# Patient Record
Sex: FEMALE | Race: WHITE | NOT HISPANIC OR LATINO | Employment: UNEMPLOYED | URBAN - NONMETROPOLITAN AREA
[De-identification: names, ages, dates, MRNs, and addresses within clinical notes are randomized per-mention and may not be internally consistent; named-entity substitution may affect disease eponyms.]

---

## 2018-01-11 DIAGNOSIS — Z12.11 ENCOUNTER FOR SCREENING FOR MALIGNANT NEOPLASM OF COLON: ICD-10-CM

## 2018-01-11 DIAGNOSIS — Z12.31 ENCOUNTER FOR SCREENING MAMMOGRAM FOR MALIGNANT NEOPLASM OF BREAST: ICD-10-CM

## 2018-01-15 NOTE — MISCELLANEOUS
Provider Comments  Provider Comments:   PATIENT DID NOT SHOW FOR APPOINTMENT TODAY      Signatures   Electronically signed by : Patience Wong DO; Mar 22 2016  5:59PM EST                       (Author)

## 2018-01-17 NOTE — MISCELLANEOUS
Provider Comments  Provider Comments:   patient failed to show for appointment      Signatures   Electronically signed by : Duarte Marsh DO; May 31 2016  5:18PM EST                       (Author)

## 2018-02-23 DIAGNOSIS — F41.9 ANXIETY: Primary | ICD-10-CM

## 2018-02-23 RX ORDER — ALPRAZOLAM 0.5 MG/1
1 TABLET ORAL EVERY 8 HOURS PRN
COMMUNITY
Start: 2012-07-03 | End: 2018-02-23 | Stop reason: SDUPTHER

## 2018-02-23 RX ORDER — ALPRAZOLAM 0.5 MG/1
0.5 TABLET ORAL EVERY 8 HOURS PRN
Qty: 90 TABLET | Refills: 0 | Status: SHIPPED | OUTPATIENT
Start: 2018-02-23 | End: 2018-03-26 | Stop reason: SDUPTHER

## 2018-02-23 NOTE — TELEPHONE ENCOUNTER
[facility] just make sure they rescheduled appt that they did not come to last month I think

## 2018-03-14 RX ORDER — LISINOPRIL 5 MG/1
TABLET ORAL
COMMUNITY
Start: 2014-06-25 | End: 2019-02-23 | Stop reason: ALTCHOICE

## 2018-03-14 RX ORDER — OMEPRAZOLE 20 MG/1
1 CAPSULE, DELAYED RELEASE ORAL AS NEEDED
COMMUNITY
Start: 2016-09-19 | End: 2019-02-23 | Stop reason: ALTCHOICE

## 2018-03-14 RX ORDER — ERGOCALCIFEROL 1.25 MG/1
1 CAPSULE ORAL WEEKLY
COMMUNITY
Start: 2016-10-06 | End: 2019-02-23 | Stop reason: ALTCHOICE

## 2018-03-14 RX ORDER — CITALOPRAM 20 MG/1
TABLET ORAL DAILY
COMMUNITY
Start: 2017-11-16 | End: 2018-03-17 | Stop reason: SDUPTHER

## 2018-03-14 RX ORDER — ASPIRIN 325 MG
TABLET, DELAYED RELEASE (ENTERIC COATED) ORAL
COMMUNITY

## 2018-03-14 RX ORDER — ATORVASTATIN CALCIUM 10 MG/1
1 TABLET, FILM COATED ORAL DAILY
COMMUNITY
Start: 2016-10-06 | End: 2019-08-05 | Stop reason: ALTCHOICE

## 2018-03-14 RX ORDER — METOPROLOL TARTRATE 50 MG/1
1 TABLET, FILM COATED ORAL 2 TIMES DAILY
COMMUNITY
Start: 2017-03-19 | End: 2018-03-17 | Stop reason: SDUPTHER

## 2018-03-14 RX ORDER — BUPROPION HYDROCHLORIDE 75 MG/1
1 TABLET ORAL DAILY
COMMUNITY
Start: 2015-09-08 | End: 2019-02-23 | Stop reason: ALTCHOICE

## 2018-03-17 ENCOUNTER — OFFICE VISIT (OUTPATIENT)
Dept: INTERNAL MEDICINE CLINIC | Facility: CLINIC | Age: 53
End: 2018-03-17
Payer: COMMERCIAL

## 2018-03-17 VITALS
HEART RATE: 73 BPM | WEIGHT: 171 LBS | TEMPERATURE: 96.8 F | HEIGHT: 64 IN | BODY MASS INDEX: 29.19 KG/M2 | OXYGEN SATURATION: 96 %

## 2018-03-17 DIAGNOSIS — F41.9 ANXIETY: ICD-10-CM

## 2018-03-17 DIAGNOSIS — I10 ESSENTIAL HYPERTENSION: Primary | ICD-10-CM

## 2018-03-17 DIAGNOSIS — J44.9 CHRONIC OBSTRUCTIVE PULMONARY DISEASE, UNSPECIFIED COPD TYPE (HCC): ICD-10-CM

## 2018-03-17 PROCEDURE — 99212 OFFICE O/P EST SF 10 MIN: CPT | Performed by: INTERNAL MEDICINE

## 2018-03-17 RX ORDER — METOPROLOL TARTRATE 50 MG/1
50 TABLET, FILM COATED ORAL 2 TIMES DAILY
Qty: 180 TABLET | Refills: 3 | Status: SHIPPED | OUTPATIENT
Start: 2018-03-17 | End: 2019-03-12 | Stop reason: SDUPTHER

## 2018-03-17 RX ORDER — CITALOPRAM 20 MG/1
20 TABLET ORAL DAILY
Qty: 90 TABLET | Refills: 3 | Status: SHIPPED | OUTPATIENT
Start: 2018-03-17 | End: 2019-11-08 | Stop reason: SDUPTHER

## 2018-03-17 NOTE — PROGRESS NOTES
Assessment/Plan:      Diagnoses and all orders for this visit:    Chronic obstructive pulmonary disease, unspecified COPD type ([facility] [address] )  -     Complete pulmonary function test; Future  Deferred since no insurance at this time    Essential hypertension  Refills sent to [facility]  Stop Smoking,  Low sodium diet    Anxiety  Continue present Rx    Pt and her  have been trying to get insurance coverage and have been meeting some challenges  We provided information hopefully that will help with coverage and allow them labs and other testing  Patient ID: Marielos Quintero is a 46 y o  female  HPI   Pt doing ok  Still smoking  She hurt he left ankle around the holidays and still has pain and swelling  Pt has not seen ortho because  insurance and limited financial means  She is stressed by financial challenges  No significant sob or chest pain  No uri sxs  Review of Systems   Constitutional: Negative  HENT: Negative  Eyes: Negative  Respiratory: Positive for shortness of breath  Cardiovascular: Negative  Gastrointestinal: Negative  Endocrine: Negative  Genitourinary: Negative  Musculoskeletal: Positive for joint swelling  Skin: Negative  Allergic/Immunologic: Negative  Neurological: Negative  Hematological: Negative  Psychiatric/Behavioral: Negative  Physical Exam   Constitutional: She is oriented to person, place, and time  She appears well-developed and well-nourished  HENT:   Head: Normocephalic and atraumatic  Eyes: Conjunctivae and EOM are normal  Pupils are equal, round, and reactive to light  Neck: Normal range of motion  Neck supple  Cardiovascular: Normal rate and regular rhythm  Pulmonary/Chest: Effort normal and breath sounds normal    Abdominal: Soft  Bowel sounds are normal    Musculoskeletal: She exhibits edema and deformity  Left ankle pain s/p injury   Lymphadenopathy:     She has no cervical adenopathy     Neurological: She is alert and oriented to person, place, and time  She has normal reflexes  She displays normal reflexes  No cranial nerve deficit  She exhibits abnormal muscle tone  Coordination abnormal    Skin: Skin is warm and dry  No rash noted  No erythema  No pallor  Psychiatric: She has a normal mood and affect  Her behavior is normal  Judgment and thought content normal    Nursing note and vitals reviewed

## 2018-03-26 DIAGNOSIS — F41.9 ANXIETY: ICD-10-CM

## 2018-03-26 RX ORDER — ALPRAZOLAM 0.5 MG/1
0.5 TABLET ORAL EVERY 8 HOURS PRN
Qty: 90 TABLET | Refills: 0 | Status: SHIPPED | OUTPATIENT
Start: 2018-03-26 | End: 2018-04-26 | Stop reason: SDUPTHER

## 2018-04-26 DIAGNOSIS — F41.9 ANXIETY: ICD-10-CM

## 2018-04-26 RX ORDER — ALPRAZOLAM 0.5 MG/1
0.5 TABLET ORAL EVERY 8 HOURS PRN
Qty: 90 TABLET | Refills: 0 | Status: SHIPPED | OUTPATIENT
Start: 2018-04-26 | End: 2018-05-23 | Stop reason: SDUPTHER

## 2018-05-23 DIAGNOSIS — F41.9 ANXIETY: ICD-10-CM

## 2018-05-23 RX ORDER — ALPRAZOLAM 0.5 MG/1
0.5 TABLET ORAL EVERY 8 HOURS PRN
Qty: 90 TABLET | Refills: 0 | Status: SHIPPED | OUTPATIENT
Start: 2018-05-23 | End: 2018-05-25 | Stop reason: SDUPTHER

## 2018-05-25 DIAGNOSIS — F41.9 ANXIETY: ICD-10-CM

## 2018-05-25 RX ORDER — ALPRAZOLAM 0.5 MG/1
0.5 TABLET ORAL EVERY 8 HOURS PRN
Qty: 90 TABLET | Refills: 0 | Status: SHIPPED | OUTPATIENT
Start: 2018-05-25 | End: 2018-06-25 | Stop reason: SDUPTHER

## 2018-06-25 DIAGNOSIS — F41.9 ANXIETY: ICD-10-CM

## 2018-06-25 RX ORDER — ALPRAZOLAM 0.5 MG/1
0.5 TABLET ORAL EVERY 8 HOURS PRN
Qty: 90 TABLET | Refills: 0 | Status: SHIPPED | OUTPATIENT
Start: 2018-06-25 | End: 2018-07-24 | Stop reason: SDUPTHER

## 2018-07-24 DIAGNOSIS — F41.9 ANXIETY: ICD-10-CM

## 2018-07-24 RX ORDER — ALPRAZOLAM 0.5 MG/1
0.5 TABLET ORAL EVERY 8 HOURS PRN
Qty: 90 TABLET | Refills: 0 | Status: SHIPPED | OUTPATIENT
Start: 2018-07-24 | End: 2018-08-24 | Stop reason: SDUPTHER

## 2018-08-24 DIAGNOSIS — F41.9 ANXIETY: ICD-10-CM

## 2018-08-24 RX ORDER — ALPRAZOLAM 0.5 MG/1
0.5 TABLET ORAL EVERY 8 HOURS PRN
Qty: 90 TABLET | Refills: 0 | Status: SHIPPED | OUTPATIENT
Start: 2018-08-24 | End: 2018-09-25 | Stop reason: SDUPTHER

## 2018-09-25 DIAGNOSIS — B37.9 YEAST INFECTION: ICD-10-CM

## 2018-09-25 DIAGNOSIS — F41.9 ANXIETY: ICD-10-CM

## 2018-09-25 DIAGNOSIS — J32.9 SINUSITIS, UNSPECIFIED CHRONICITY, UNSPECIFIED LOCATION: Primary | ICD-10-CM

## 2018-09-25 RX ORDER — ALPRAZOLAM 0.5 MG/1
0.5 TABLET ORAL EVERY 8 HOURS PRN
Qty: 90 TABLET | Refills: 0 | Status: SHIPPED | OUTPATIENT
Start: 2018-09-25 | End: 2018-10-23 | Stop reason: SDUPTHER

## 2018-09-25 RX ORDER — FLUCONAZOLE 150 MG/1
150 TABLET ORAL ONCE
Qty: 1 TABLET | Refills: 0 | Status: SHIPPED | OUTPATIENT
Start: 2018-09-25 | End: 2018-09-25

## 2018-09-25 RX ORDER — AZITHROMYCIN 250 MG/1
TABLET, FILM COATED ORAL
Qty: 6 TABLET | Refills: 0 | Status: SHIPPED | OUTPATIENT
Start: 2018-09-25 | End: 2018-09-29

## 2018-09-25 NOTE — TELEPHONE ENCOUNTER
Pt is c/o headaches, sinus pressure, chest congestion,cough, green mucus  If an antibiotic is called in the pt states that she will need something for a yeast infection because she normally gets one when she is on an antibiotic  Allergic to lorazepam  Pharmacy [facility]

## 2018-10-03 ENCOUNTER — TELEPHONE (OUTPATIENT)
Dept: INTERNAL MEDICINE CLINIC | Facility: CLINIC | Age: 53
End: 2018-10-03

## 2018-10-03 DIAGNOSIS — J32.9 SINUSITIS, UNSPECIFIED CHRONICITY, UNSPECIFIED LOCATION: Primary | ICD-10-CM

## 2018-10-03 RX ORDER — AMOXICILLIN 500 MG/1
500 CAPSULE ORAL EVERY 8 HOURS SCHEDULED
Qty: 21 CAPSULE | Refills: 0 | Status: SHIPPED | OUTPATIENT
Start: 2018-10-03 | End: 2018-10-10

## 2018-10-03 NOTE — TELEPHONE ENCOUNTER
Finished zpack and symptoms returned she states  Pt c/o fever, head shiloh, prod cough and diarrhea        All-Lorazepam

## 2018-10-23 DIAGNOSIS — F41.9 ANXIETY: ICD-10-CM

## 2018-10-23 RX ORDER — ALPRAZOLAM 0.5 MG/1
0.5 TABLET ORAL EVERY 8 HOURS PRN
Qty: 90 TABLET | Refills: 0 | Status: SHIPPED | OUTPATIENT
Start: 2018-10-23 | End: 2018-11-21 | Stop reason: SDUPTHER

## 2018-11-21 DIAGNOSIS — F41.9 ANXIETY: ICD-10-CM

## 2018-11-21 RX ORDER — ALPRAZOLAM 0.5 MG/1
0.5 TABLET ORAL EVERY 8 HOURS PRN
Qty: 90 TABLET | Refills: 0 | Status: SHIPPED | OUTPATIENT
Start: 2018-11-23 | End: 2018-12-20 | Stop reason: SDUPTHER

## 2018-12-20 DIAGNOSIS — F41.9 ANXIETY: ICD-10-CM

## 2018-12-20 RX ORDER — ALPRAZOLAM 0.5 MG/1
0.5 TABLET ORAL EVERY 8 HOURS PRN
Qty: 90 TABLET | Refills: 0 | Status: SHIPPED | OUTPATIENT
Start: 2018-12-20 | End: 2019-01-23 | Stop reason: SDUPTHER

## 2019-01-23 DIAGNOSIS — F41.9 ANXIETY: ICD-10-CM

## 2019-01-23 RX ORDER — ALPRAZOLAM 0.5 MG/1
0.5 TABLET ORAL EVERY 8 HOURS PRN
Qty: 90 TABLET | Refills: 0 | Status: SHIPPED | OUTPATIENT
Start: 2019-01-23 | End: 2019-04-22 | Stop reason: SDUPTHER

## 2019-02-01 ENCOUNTER — TELEPHONE (OUTPATIENT)
Dept: INTERNAL MEDICINE CLINIC | Facility: CLINIC | Age: 54
End: 2019-02-01

## 2019-02-01 NOTE — TELEPHONE ENCOUNTER
Pt called asking about prior auth for Alprazolam  Spoke with [facility] and she stated it was done were just waiting to hear back  Pt is aware

## 2019-02-05 DIAGNOSIS — F41.9 ANXIETY: Primary | ICD-10-CM

## 2019-02-05 RX ORDER — LORAZEPAM 0.5 MG/1
0.5 TABLET ORAL EVERY 8 HOURS PRN
Qty: 90 TABLET | Refills: 0 | Status: SHIPPED | OUTPATIENT
Start: 2019-02-05 | End: 2019-08-05 | Stop reason: ALTCHOICE

## 2019-02-05 NOTE — TELEPHONE ENCOUNTER
Spoke with Moiz Osman and she will try the Lorazepam that the ins will cover  ? Dose and frequency?      [facility]

## 2019-02-05 NOTE — TELEPHONE ENCOUNTER
Ins denied Alprazolam, patient is asking if she could pay out of pocket as she is afraid to try something else   She was on something in past which she states did not work     Insurance will only cover Lorazepam, no prior auth required, has to try this and fail then they will allow alprazolam

## 2019-02-23 ENCOUNTER — OFFICE VISIT (OUTPATIENT)
Dept: INTERNAL MEDICINE CLINIC | Facility: CLINIC | Age: 54
End: 2019-02-23
Payer: COMMERCIAL

## 2019-02-23 VITALS
BODY MASS INDEX: 31.62 KG/M2 | OXYGEN SATURATION: 96 % | DIASTOLIC BLOOD PRESSURE: 70 MMHG | HEIGHT: 64 IN | SYSTOLIC BLOOD PRESSURE: 134 MMHG | TEMPERATURE: 99.6 F | HEART RATE: 85 BPM | WEIGHT: 185.2 LBS

## 2019-02-23 DIAGNOSIS — Z12.31 SCREENING MAMMOGRAM, ENCOUNTER FOR: ICD-10-CM

## 2019-02-23 DIAGNOSIS — Z00.00 VISIT FOR PREVENTIVE HEALTH EXAMINATION: Primary | ICD-10-CM

## 2019-02-23 DIAGNOSIS — E66.9 OBESITY (BMI 30.0-34.9): ICD-10-CM

## 2019-02-23 DIAGNOSIS — F41.9 ANXIETY: ICD-10-CM

## 2019-02-23 DIAGNOSIS — Z12.11 COLON CANCER SCREENING: ICD-10-CM

## 2019-02-23 PROCEDURE — 99396 PREV VISIT EST AGE 40-64: CPT | Performed by: INTERNAL MEDICINE

## 2019-02-23 RX ORDER — ALPRAZOLAM 0.5 MG/1
0.5 TABLET ORAL
Qty: 30 TABLET | Refills: 0 | Status: SHIPPED | OUTPATIENT
Start: 2019-02-23 | End: 2019-03-21 | Stop reason: SDUPTHER

## 2019-02-23 RX ORDER — SERTRALINE HYDROCHLORIDE 25 MG/1
25 TABLET, FILM COATED ORAL DAILY
Qty: 30 TABLET | Refills: 5 | Status: SHIPPED | OUTPATIENT
Start: 2019-02-23 | End: 2019-04-15 | Stop reason: SDUPTHER

## 2019-02-23 NOTE — PATIENT INSTRUCTIONS
Low Fat Diet   AMBULATORY CARE:   A low-fat diet  is an eating plan that is low in total fat, unhealthy fat, and cholesterol  You may need to follow a low-fat diet if you have trouble digesting or absorbing fat  You may also need to follow this diet if you have high cholesterol  You can also lower your cholesterol by increasing the amount of fiber in your diet  Soluble fiber is a type of fiber that helps to decrease cholesterol levels  Different types of fat in food:   · Limit unhealthy fats  A diet that is high in cholesterol, saturated fat, and trans fat may cause unhealthy cholesterol levels  Unhealthy cholesterol levels increase your risk of heart disease  ¨ Cholesterol:  Limit intake of cholesterol to less than 200 mg per day  Cholesterol is found in meat, eggs, and dairy  ¨ Saturated fat:  Limit saturated fat to less than 7% of your total daily calories  Ask your dietitian how many calories you need each day  Saturated fat is found in butter, cheese, ice cream, whole milk, and palm oil  Saturated fat is also found in meat, such as beef, pork, chicken skin, and processed meats  Processed meats include sausage, hot dogs, and bologna  ¨ Trans fat:  Avoid trans fat as much as possible  Trans fat is used in fried and baked foods  Foods that say trans fat free on the label may still have up to 0 5 grams of trans fat per serving  · Include healthy fats  Replace foods that are high in saturated and trans fat with foods high in healthy fats  This may help to decrease high cholesterol levels  ¨ Monounsaturated fats: These are found in avocados, nuts, and vegetable oils, such as olive, canola, and sunflower oil  ¨ Polyunsaturated fats: These can be found in vegetable oils, such as soybean or corn oil  Omega-3 fats can help to decrease the risk of heart disease  Omega-3 fats are found in fish, such as salmon, herring, trout, and tuna   Omega-3 fats can also be found in plant foods, such as walnuts, flaxseed, soybeans, and canola oil    Foods to limit or avoid:   · Grains:      ¨ Snacks that are made with partially hydrogenated oils, such as chips, regular crackers, and butter-flavored popcorn    ¨ High-fat baked goods, such as biscuits, croissants, doughnuts, pies, cookies, and pastries    · Dairy:      ¨ Whole milk, 2% milk, and yogurt and ice cream made with whole milk    ¨ Half and half creamer, heavy cream, and whipping cream    ¨ Cheese, cream cheese, and sour cream    · Meats and proteins:      ¨ High-fat cuts of meat (T-bone steak, regular hamburger, and ribs)    ¨ Fried meat, poultry (turkey and chicken), and fish    ¨ Poultry (chicken and turkey) with skin    ¨ Cold cuts (salami or bologna), hot dogs, adames, and sausage    ¨ Whole eggs and egg yolks    · Vegetables and fruits with added fat:      ¨ Fried vegetables or vegetables in butter or high-fat sauces, such as cream or cheese sauces    ¨ Fried fruit or fruit served with butter or cream    · Fats:      ¨ Butter, stick margarine, and shortening    ¨ Coconut, palm oil, and palm kernel oil  Foods to include:   · Grains:      ¨ Whole-grain breads, cereals, pasta, and brown rice    ¨ Low-fat crackers and pretzels    · Vegetables and fruits:      ¨ Fresh, frozen, or canned vegetables (no salt or low-sodium)    ¨ Fresh, frozen, dried, or canned fruit (canned in light syrup or fruit juice)    ¨ Avocado    · Low-fat dairy products:      ¨ Nonfat (skim) or 1% milk    ¨ Nonfat or low-fat cheese, yogurt, and cottage cheese    · Meats and proteins:      ¨ Chicken or turkey with no skin    ¨ Baked or broiled fish    ¨ Lean beef and pork (loin, round, extra lean hamburger)    ¨ Beans and peas, unsalted nuts, soy products    ¨ Egg whites and substitutes    ¨ Seeds and nuts    · Fats:      ¨ Unsaturated oil, such as canola, olive, peanut, soybean, or sunflower oil    ¨ Soft or liquid margarine and vegetable oil spread    ¨ Low-fat salad dressing  Other ways to decrease fat:   · Read food labels before you buy foods  Choose foods that have less than 30% of calories from fat  Choose low-fat or fat-free dairy products  Remember that fat free does not mean calorie free  These foods still contain calories, and too many calories can lead to weight gain  · Trim fat from meat and avoid fried food  Trim all visible fat from meat before you cook it  Remove the skin from poultry  Do not mahan meat, fish, or poultry  Bake, roast, boil, or broil these foods instead  Avoid fried foods  Eat a baked potato instead of Western Josi fries  Steam vegetables instead of sautéing them in butter  · Add less fat to foods  Use imitation adames bits on salads and baked potatoes instead of regular adames bits  Use fat-free or low-fat salad dressings instead of regular dressings  Use low-fat or nonfat butter-flavored topping instead of regular butter or margarine on popcorn and other foods  Ways to decrease fat in recipes:  Replace high-fat ingredients with low-fat or nonfat ones  This may cause baked goods to be drier than usual  You may need to use nonfat cooking spray on pans to prevent food from sticking  You also may need to change the amount of other ingredients, such as water, in the recipe  Try the following:  · Use low-fat or light margarine instead of regular margarine or shortening  · Use lean ground turkey breast or chicken, or lean ground beef (less than 5% fat) instead of hamburger  · Add 1 teaspoon of canola oil to 8 ounces of skim milk instead of using cream or half and half  · Use grated zucchini, carrots, or apples in breads instead of coconut  · Use blenderized, low-fat cottage cheese, plain tofu, or low-fat ricotta cheese instead of cream cheese  · Use 1 egg white and 1 teaspoon of canola oil, or use ¼ cup (2 ounces) of fat-free egg substitute instead of a whole egg       · Replace half of the oil that is called for in a recipe with applesauce when you bake  Use 3 tablespoons of cocoa powder and 1 tablespoon of canola oil instead of a square of baking chocolate  How to increase fiber:  Eat enough high-fiber foods to get 20 to 30 grams of fiber every day  Slowly increase your fiber intake to avoid stomach cramps, gas, and other problems  · Eat 3 ounces of whole-grain foods each day  An ounce is about 1 slice of bread  Eat whole-grain breads, such as whole-wheat bread  Whole wheat, whole-wheat flour, or other whole grains should be listed as the first ingredient on the food label  Replace white flour with whole-grain flour or use half of each in recipes  Whole-grain flour is heavier than white flour, so you may have to add more yeast or baking powder  · Eat a high-fiber cereal for breakfast   Oatmeal is a good source of soluble fiber  Look for cereals that have bran or fiber in the name  Choose whole-grain products, such as brown rice, barley, and whole-wheat pasta  · Eat more beans, peas, and lentils  For example, add beans to soups or salads  Eat at least 5 cups of fruits and vegetables each day  Eat fruits and vegetables with the peel because the peel is high in fiber  © 2017 [employer] Information is for End User's use only and may not be sold, redistributed or otherwise used for commercial purposes  All illustrations and images included in CareNotes® are the copyrighted property of A D [employer] M [employer] Inc  or [employer]  The above information is an  only  It is not intended as medical advice for individual conditions or treatments  Talk to your doctor, nurse or pharmacist before following any medical regimen to see if it is safe and effective for you

## 2019-02-23 NOTE — PROGRESS NOTES
Assessment/Plan:         Diagnoses and all orders for this visit:    Visit for preventive health examination  Rx for mammogram and pt will schedule  Increase water  Labs at health screening in march      Colon cancer screening  -     Occult Blood, Fecal Immunochemical; Future    Anxiety  -     ALPRAZolam (XANAX) 0 5 mg tablet; Take 1 tablet (0 5 mg total) by mouth daily at bedtime as needed for anxiety  -     sertraline (ZOLOFT) 25 mg tablet; Take 1 tablet (25 mg total) by mouth daily    Screening mammogram, encounter for  -     Mammo screening bilateral w 3d & cad; Future    Rto 6 months     Patient ID: Chapincito Velázquez is a 48 y o  female  HPI   Pt has not tolerated the citalopram well and is getting hives and loose stools since on She is asking for xanax back  No chest pain or sob  Review of Systems   Constitutional: Negative  HENT: Negative  Eyes: Negative  Respiratory: Negative  Cardiovascular: Negative  Gastrointestinal: Negative  Endocrine: Negative  Genitourinary: Negative  Musculoskeletal: Negative  Skin: Negative  Allergic/Immunologic: Negative  Neurological: Negative  Hematological: Negative  Psychiatric/Behavioral: Negative  No past medical history on file    Past Surgical History:   Procedure Laterality Date    HYSTERECTOMY      TUBAL LIGATION       Social History     Socioeconomic History    Marital status: /Civil Union     Spouse name: Not on file    Number of children: Not on file    Years of education: Not on file    Highest education level: Not on file   Occupational History    Not on file   Social Needs    Financial resource strain: Not on file    Food insecurity:     Worry: Not on file     Inability: Not on file    Transportation needs:     Medical: Not on file     Non-medical: Not on file   Tobacco Use    Smoking status: Current Every Day Smoker    Smokeless tobacco: Never Used   Substance and Sexual Activity    Alcohol use: Not on file    Drug use: No    Sexual activity: Not on file   Lifestyle    Physical activity:     Days per week: Not on file     Minutes per session: Not on file    Stress: Not on file   Relationships    Social connections:     Talks on phone: Not on file     Gets together: Not on file     Attends Yarsani service: Not on file     Active member of club or organization: Not on file     Attends meetings of clubs or organizations: Not on file     Relationship status: Not on file    Intimate partner violence:     Fear of current or ex partner: Not on file     Emotionally abused: Not on file     Physically abused: Not on file     Forced sexual activity: Not on file   Other Topics Concern    Not on file   Social History Narrative    Dental care, occasionally    Lives independently with spouse    No living will    Sun protection - sunscreen    Uses safety equipment - seatbelts     Allergies   Allergen Reactions    Lorazepam                /70   Pulse 85   Temp 99 6 °F (37 6 °C) (Tympanic)   Ht 5' 4" (1 626 m)   Wt 84 kg (185 lb 3 2 oz)   SpO2 96%   BMI 31 79 kg/m²          Physical Exam   Constitutional: She is oriented to person, place, and time  She appears well-developed and well-nourished  No distress  HENT:   Head: Normocephalic and atraumatic  Right Ear: External ear normal    Left Ear: External ear normal    Nose: Nose normal    Mouth/Throat: Oropharynx is clear and moist    Eyes: Pupils are equal, round, and reactive to light  Conjunctivae and EOM are normal    Neck: Normal range of motion  Neck supple  Cardiovascular: Normal rate, regular rhythm, normal heart sounds and intact distal pulses  Pulmonary/Chest: Effort normal and breath sounds normal    Abdominal: Soft  Bowel sounds are normal    Musculoskeletal: Normal range of motion  Neurological: She is alert and oriented to person, place, and time  Skin: Skin is warm and dry  Capillary refill takes less than 2 seconds  Rash noted  She is not diaphoretic  Psychiatric: She has a normal mood and affect  Her behavior is normal  Judgment and thought content normal      BMI Counseling: Body mass index is 31 79 kg/m²  Discussed the patient's BMI with her  The BMI is above average  BMI counseling and education was provided to the patient  Nutrition recommendations include decreasing overall calorie intake

## 2019-03-12 DIAGNOSIS — I10 ESSENTIAL HYPERTENSION: ICD-10-CM

## 2019-03-12 RX ORDER — METOPROLOL TARTRATE 50 MG/1
50 TABLET, FILM COATED ORAL 2 TIMES DAILY
Qty: 180 TABLET | Refills: 3 | Status: SHIPPED | OUTPATIENT
Start: 2019-03-12 | End: 2019-12-05 | Stop reason: SDUPTHER

## 2019-03-21 DIAGNOSIS — F41.9 ANXIETY: ICD-10-CM

## 2019-03-21 RX ORDER — ALPRAZOLAM 0.5 MG/1
0.5 TABLET ORAL
Qty: 30 TABLET | Refills: 0 | Status: SHIPPED | OUTPATIENT
Start: 2019-03-21 | End: 2019-03-22 | Stop reason: SDUPTHER

## 2019-03-22 DIAGNOSIS — F41.9 ANXIETY: ICD-10-CM

## 2019-03-22 RX ORDER — ALPRAZOLAM 0.5 MG/1
0.5 TABLET ORAL
Qty: 30 TABLET | Refills: 0 | Status: SHIPPED | OUTPATIENT
Start: 2019-03-22 | End: 2019-04-22 | Stop reason: SDUPTHER

## 2019-04-15 DIAGNOSIS — F41.9 ANXIETY: ICD-10-CM

## 2019-04-15 RX ORDER — SERTRALINE HYDROCHLORIDE 25 MG/1
25 TABLET, FILM COATED ORAL DAILY
Qty: 90 TABLET | Refills: 3 | Status: SHIPPED | OUTPATIENT
Start: 2019-04-15 | End: 2019-08-05 | Stop reason: ALTCHOICE

## 2019-04-22 DIAGNOSIS — F41.9 ANXIETY: ICD-10-CM

## 2019-04-22 RX ORDER — ALPRAZOLAM 0.5 MG/1
0.5 TABLET ORAL
Qty: 30 TABLET | Refills: 0 | Status: SHIPPED | OUTPATIENT
Start: 2019-04-22 | End: 2019-05-21 | Stop reason: SDUPTHER

## 2019-05-21 DIAGNOSIS — F41.9 ANXIETY: ICD-10-CM

## 2019-05-21 RX ORDER — ALPRAZOLAM 0.5 MG/1
0.5 TABLET ORAL
Qty: 30 TABLET | Refills: 0 | Status: SHIPPED | OUTPATIENT
Start: 2019-05-21 | End: 2019-06-18 | Stop reason: SDUPTHER

## 2019-06-18 DIAGNOSIS — F41.9 ANXIETY: ICD-10-CM

## 2019-06-18 RX ORDER — ALPRAZOLAM 0.5 MG/1
0.5 TABLET ORAL
Qty: 30 TABLET | Refills: 0 | Status: SHIPPED | OUTPATIENT
Start: 2019-06-21 | End: 2019-07-18 | Stop reason: SDUPTHER

## 2019-06-20 ENCOUNTER — TELEPHONE (OUTPATIENT)
Dept: INTERNAL MEDICINE CLINIC | Facility: CLINIC | Age: 54
End: 2019-06-20

## 2019-06-20 DIAGNOSIS — J32.9 SINUSITIS, UNSPECIFIED CHRONICITY, UNSPECIFIED LOCATION: ICD-10-CM

## 2019-06-20 DIAGNOSIS — B37.9 YEAST INFECTION: Primary | ICD-10-CM

## 2019-06-20 RX ORDER — AMOXICILLIN 500 MG/1
500 CAPSULE ORAL EVERY 8 HOURS SCHEDULED
Qty: 21 CAPSULE | Refills: 0 | Status: SHIPPED | OUTPATIENT
Start: 2019-06-20 | End: 2019-06-27

## 2019-06-20 RX ORDER — FLUCONAZOLE 150 MG/1
150 TABLET ORAL ONCE
Qty: 1 TABLET | Refills: 0 | Status: SHIPPED | OUTPATIENT
Start: 2019-06-20 | End: 2019-06-20

## 2019-07-08 ENCOUNTER — TELEPHONE (OUTPATIENT)
Dept: INTERNAL MEDICINE CLINIC | Facility: CLINIC | Age: 54
End: 2019-07-08

## 2019-07-08 DIAGNOSIS — B37.9 YEAST INFECTION: Primary | ICD-10-CM

## 2019-07-08 DIAGNOSIS — J02.9 SORE THROAT: ICD-10-CM

## 2019-07-08 RX ORDER — AMOXICILLIN 500 MG/1
500 CAPSULE ORAL EVERY 8 HOURS SCHEDULED
Qty: 21 CAPSULE | Refills: 0 | Status: SHIPPED | OUTPATIENT
Start: 2019-07-08 | End: 2019-07-15

## 2019-07-08 RX ORDER — FLUCONAZOLE 150 MG/1
150 TABLET ORAL ONCE
Qty: 1 TABLET | Refills: 0 | Status: SHIPPED | OUTPATIENT
Start: 2019-07-08 | End: 2019-07-08

## 2019-07-08 NOTE — TELEPHONE ENCOUNTER
Patient has c/o chills, n/v, body aches, sore throat, cough, fever states started on Friday        Needs diflucan if antibiotic ordered    All-lorazepam

## 2019-07-18 DIAGNOSIS — F41.9 ANXIETY: ICD-10-CM

## 2019-07-18 RX ORDER — ALPRAZOLAM 0.5 MG/1
0.5 TABLET ORAL
Qty: 30 TABLET | Refills: 0 | Status: SHIPPED | OUTPATIENT
Start: 2019-07-18 | End: 2019-08-16 | Stop reason: SDUPTHER

## 2019-07-23 ENCOUNTER — TELEPHONE (OUTPATIENT)
Dept: GASTROENTEROLOGY | Facility: CLINIC | Age: 54
End: 2019-07-23

## 2019-08-05 ENCOUNTER — OFFICE VISIT (OUTPATIENT)
Dept: INTERNAL MEDICINE CLINIC | Facility: CLINIC | Age: 54
End: 2019-08-05
Payer: COMMERCIAL

## 2019-08-05 VITALS
DIASTOLIC BLOOD PRESSURE: 78 MMHG | HEART RATE: 70 BPM | HEIGHT: 64 IN | TEMPERATURE: 98.6 F | WEIGHT: 182.5 LBS | BODY MASS INDEX: 31.16 KG/M2 | OXYGEN SATURATION: 98 % | SYSTOLIC BLOOD PRESSURE: 136 MMHG

## 2019-08-05 DIAGNOSIS — I10 ESSENTIAL HYPERTENSION: ICD-10-CM

## 2019-08-05 DIAGNOSIS — B02.9 HERPES ZOSTER WITHOUT COMPLICATION: ICD-10-CM

## 2019-08-05 DIAGNOSIS — J30.1 ALLERGIC RHINITIS DUE TO POLLEN, UNSPECIFIED SEASONALITY: ICD-10-CM

## 2019-08-05 DIAGNOSIS — E78.2 MIXED HYPERLIPIDEMIA: ICD-10-CM

## 2019-08-05 DIAGNOSIS — J43.9 PULMONARY EMPHYSEMA, UNSPECIFIED EMPHYSEMA TYPE (HCC): Primary | ICD-10-CM

## 2019-08-05 PROCEDURE — 3075F SYST BP GE 130 - 139MM HG: CPT | Performed by: INTERNAL MEDICINE

## 2019-08-05 PROCEDURE — 4004F PT TOBACCO SCREEN RCVD TLK: CPT | Performed by: INTERNAL MEDICINE

## 2019-08-05 PROCEDURE — 99214 OFFICE O/P EST MOD 30 MIN: CPT | Performed by: INTERNAL MEDICINE

## 2019-08-05 PROCEDURE — 96372 THER/PROPH/DIAG INJ SC/IM: CPT

## 2019-08-05 PROCEDURE — 3008F BODY MASS INDEX DOCD: CPT | Performed by: INTERNAL MEDICINE

## 2019-08-05 RX ORDER — METHYLPREDNISOLONE ACETATE 40 MG/ML
40 INJECTION, SUSPENSION INTRA-ARTICULAR; INTRALESIONAL; INTRAMUSCULAR; SOFT TISSUE ONCE
Status: COMPLETED | OUTPATIENT
Start: 2019-08-05 | End: 2019-08-05

## 2019-08-05 RX ADMIN — METHYLPREDNISOLONE ACETATE 40 MG: 40 INJECTION, SUSPENSION INTRA-ARTICULAR; INTRALESIONAL; INTRAMUSCULAR; SOFT TISSUE at 14:39

## 2019-08-05 NOTE — PROGRESS NOTES
Assessment/Plan:         Diagnoses and all orders for this visit:    Pulmonary emphysema, unspecified emphysema type ([facility] [address] )  -     CT chest w contrast; Future  -     Comprehensive metabolic panel; Future  Recheck CT now that she has coverage and can get imaging done    Essential hypertension  -     Comprehensive metabolic panel; Future  No added salt diet    Mixed hyperlipidemia  -     Lipid panel; Future  Rx for fbw - last done 2016    Shingles  Recent rash is visibly recent shingles - past time frame for Rx and she takes advil prn    Will call after testing     Patient ID: Daron Macedo is a 48 y o  female  HPI   Pt here for routine checkup No chest pain or sob Still smoking - rolls her own cigarettes  No hemoptysis She does have coverage for testing  She had a rash recently but cancelled appt since her Mom was ill - she has dry lesions on abdomen with some discomfort  She thought rxn to amoxil as that was when rash came out her  was injured at work in February so has been under a lot of stress She does not feel she can quit tobacco at present time        Review of Systems   Constitutional: Negative  HENT: Negative  Respiratory: Negative for cough and shortness of breath  Cardiovascular: Negative  Gastrointestinal: Negative  Genitourinary: Negative  Musculoskeletal: Positive for arthralgias  Skin: Positive for rash  Neurological: Negative  Hematological: Negative  Psychiatric/Behavioral: Negative  No past medical history on file    Past Surgical History:   Procedure Laterality Date    HYSTERECTOMY      TUBAL LIGATION       Social History     Socioeconomic History    Marital status: /Civil Union     Spouse name: Not on file    Number of children: Not on file    Years of education: Not on file    Highest education level: Not on file   Occupational History    Not on file   Social Needs    Financial resource strain: Not on file    Food insecurity:     Worry: Not on file     Inability: Not on file    Transportation needs:     Medical: Not on file     Non-medical: Not on file   Tobacco Use    Smoking status: Current Every Day Smoker    Smokeless tobacco: Never Used   Substance and Sexual Activity    Alcohol use: Not on file    Drug use: No    Sexual activity: Not on file   Lifestyle    Physical activity:     Days per week: Not on file     Minutes per session: Not on file    Stress: Not on file   Relationships    Social connections:     Talks on phone: Not on file     Gets together: Not on file     Attends Rastafarian service: Not on file     Active member of club or organization: Not on file     Attends meetings of clubs or organizations: Not on file     Relationship status: Not on file    Intimate partner violence:     Fear of current or ex partner: Not on file     Emotionally abused: Not on file     Physically abused: Not on file     Forced sexual activity: Not on file   Other Topics Concern    Not on file   Social History Narrative    Dental care, occasionally    Lives independently with spouse    No living will    Sun protection - sunscreen    Uses safety equipment - seatbelts     Allergies   Allergen Reactions    Lorazepam                /78   Pulse 70   Temp 98 6 °F (37 °C) (Tympanic)   Ht 5' 4" (1 626 m)   Wt 82 8 kg (182 lb 8 oz)   SpO2 98%   BMI 31 33 kg/m²          Physical Exam   Constitutional: She is oriented to person, place, and time  She appears well-developed and well-nourished  No distress  HENT:   Head: Normocephalic and atraumatic  Mouth/Throat: No oropharyngeal exudate  Eyes: Pupils are equal, round, and reactive to light  Conjunctivae and EOM are normal  No scleral icterus  Neck: Normal range of motion  Neck supple  No JVD present  Cardiovascular: Normal rate and regular rhythm  Pulmonary/Chest: Effort normal and breath sounds normal  No respiratory distress  She has no wheezes  Abdominal: Soft   Bowel sounds are normal  Musculoskeletal: Normal range of motion  She exhibits no edema  Lymphadenopathy:     She has no cervical adenopathy  Neurological: She is alert and oriented to person, place, and time  She displays normal reflexes  No cranial nerve deficit  Coordination normal    Skin: Skin is warm and dry  Capillary refill takes less than 2 seconds  She is not diaphoretic  Psychiatric: She has a normal mood and affect  Her behavior is normal  Judgment and thought content normal    Nursing note and vitals reviewed

## 2019-08-13 ENCOUNTER — TELEPHONE (OUTPATIENT)
Dept: INTERNAL MEDICINE CLINIC | Facility: CLINIC | Age: 54
End: 2019-08-13

## 2019-08-13 NOTE — TELEPHONE ENCOUNTER
She can call  - they have reduced prices for CT scan - I am not sure what it would be but definitely cheaper

## 2019-08-16 DIAGNOSIS — F41.9 ANXIETY: ICD-10-CM

## 2019-08-16 RX ORDER — ALPRAZOLAM 0.5 MG/1
0.5 TABLET ORAL
Qty: 30 TABLET | Refills: 0 | Status: SHIPPED | OUTPATIENT
Start: 2019-08-16 | End: 2019-09-12 | Stop reason: SDUPTHER

## 2019-09-12 DIAGNOSIS — F41.9 ANXIETY: ICD-10-CM

## 2019-09-12 RX ORDER — ALPRAZOLAM 0.5 MG/1
0.5 TABLET ORAL
Qty: 30 TABLET | Refills: 0 | Status: SHIPPED | OUTPATIENT
Start: 2019-09-16 | End: 2019-10-10 | Stop reason: SDUPTHER

## 2019-10-10 DIAGNOSIS — F41.9 ANXIETY: ICD-10-CM

## 2019-10-10 RX ORDER — ALPRAZOLAM 0.5 MG/1
0.5 TABLET ORAL
Qty: 30 TABLET | Refills: 0 | Status: SHIPPED | OUTPATIENT
Start: 2019-10-10 | End: 2019-11-08 | Stop reason: SDUPTHER

## 2019-11-08 DIAGNOSIS — F41.9 ANXIETY: ICD-10-CM

## 2019-11-08 DIAGNOSIS — I10 ESSENTIAL HYPERTENSION: ICD-10-CM

## 2019-11-08 RX ORDER — ALPRAZOLAM 0.5 MG/1
0.5 TABLET ORAL
Qty: 30 TABLET | Refills: 0 | Status: SHIPPED | OUTPATIENT
Start: 2019-11-11 | End: 2019-12-05 | Stop reason: SDUPTHER

## 2019-11-08 RX ORDER — CITALOPRAM 20 MG/1
20 TABLET ORAL DAILY
Qty: 90 TABLET | Refills: 3 | Status: SHIPPED | OUTPATIENT
Start: 2019-11-08 | End: 2020-03-02 | Stop reason: SDUPTHER

## 2019-11-25 ENCOUNTER — TELEPHONE (OUTPATIENT)
Dept: INTERNAL MEDICINE CLINIC | Facility: CLINIC | Age: 54
End: 2019-11-25

## 2019-11-25 DIAGNOSIS — B37.9 YEAST INFECTION: Primary | ICD-10-CM

## 2019-11-25 DIAGNOSIS — J32.9 SINUSITIS, UNSPECIFIED CHRONICITY, UNSPECIFIED LOCATION: ICD-10-CM

## 2019-11-25 RX ORDER — AZITHROMYCIN 250 MG/1
TABLET, FILM COATED ORAL
Qty: 6 TABLET | Refills: 0 | Status: SHIPPED | OUTPATIENT
Start: 2019-11-25 | End: 2019-11-29

## 2019-11-25 RX ORDER — FLUCONAZOLE 150 MG/1
150 TABLET ORAL ONCE
Qty: 1 TABLET | Refills: 0 | Status: SHIPPED | OUTPATIENT
Start: 2019-11-25 | End: 2019-11-25

## 2019-11-25 NOTE — TELEPHONE ENCOUNTER
Patient with c/o sinus shiloh, lo grade temp, prod cough, rib pain from coughing  Chest shiloh  Symptoms started yesterday       Asking for diflucan with antibiotic    No antibiotic allergy

## 2019-12-05 DIAGNOSIS — F41.9 ANXIETY: ICD-10-CM

## 2019-12-05 DIAGNOSIS — I10 ESSENTIAL HYPERTENSION: ICD-10-CM

## 2019-12-05 RX ORDER — ALPRAZOLAM 0.5 MG/1
0.5 TABLET ORAL
Qty: 30 TABLET | Refills: 0 | Status: SHIPPED | OUTPATIENT
Start: 2019-12-07 | End: 2020-01-02 | Stop reason: SDUPTHER

## 2019-12-05 RX ORDER — METOPROLOL TARTRATE 50 MG/1
50 TABLET, FILM COATED ORAL 2 TIMES DAILY
Qty: 180 TABLET | Refills: 3 | Status: SHIPPED | OUTPATIENT
Start: 2019-12-05 | End: 2020-03-02 | Stop reason: SDUPTHER

## 2020-01-02 DIAGNOSIS — F41.9 ANXIETY: ICD-10-CM

## 2020-01-02 RX ORDER — ALPRAZOLAM 0.5 MG/1
0.5 TABLET ORAL
Qty: 30 TABLET | Refills: 0 | Status: SHIPPED | OUTPATIENT
Start: 2020-01-04 | End: 2020-02-03 | Stop reason: SDUPTHER

## 2020-01-29 ENCOUNTER — TELEPHONE (OUTPATIENT)
Dept: INTERNAL MEDICINE CLINIC | Facility: CLINIC | Age: 55
End: 2020-01-29

## 2020-01-29 ENCOUNTER — APPOINTMENT (OUTPATIENT)
Dept: RADIOLOGY | Facility: MEDICAL CENTER | Age: 55
End: 2020-01-29
Payer: COMMERCIAL

## 2020-01-29 DIAGNOSIS — M25.521 RIGHT ELBOW PAIN: ICD-10-CM

## 2020-01-29 DIAGNOSIS — M25.521 RIGHT ELBOW PAIN: Primary | ICD-10-CM

## 2020-01-29 PROCEDURE — 73080 X-RAY EXAM OF ELBOW: CPT

## 2020-02-03 DIAGNOSIS — F41.9 ANXIETY: ICD-10-CM

## 2020-02-03 RX ORDER — ALPRAZOLAM 0.5 MG/1
0.5 TABLET ORAL
Qty: 30 TABLET | Refills: 0 | Status: SHIPPED | OUTPATIENT
Start: 2020-02-03 | End: 2020-03-02 | Stop reason: SDUPTHER

## 2020-02-03 NOTE — TELEPHONE ENCOUNTER
She needs 6 months followup appt - last seen August  I am refilling this Rx but if does not schedule/come for followup visit we cannot fill again

## 2020-02-19 ENCOUNTER — OFFICE VISIT (OUTPATIENT)
Dept: INTERNAL MEDICINE CLINIC | Facility: CLINIC | Age: 55
End: 2020-02-19
Payer: COMMERCIAL

## 2020-02-19 VITALS
BODY MASS INDEX: 31.14 KG/M2 | TEMPERATURE: 99.7 F | OXYGEN SATURATION: 98 % | SYSTOLIC BLOOD PRESSURE: 126 MMHG | WEIGHT: 182.38 LBS | HEART RATE: 79 BPM | HEIGHT: 64 IN | DIASTOLIC BLOOD PRESSURE: 78 MMHG

## 2020-02-19 DIAGNOSIS — Z00.00 VISIT FOR PREVENTIVE HEALTH EXAMINATION: Primary | ICD-10-CM

## 2020-02-19 DIAGNOSIS — F41.9 ANXIETY: ICD-10-CM

## 2020-02-19 DIAGNOSIS — R91.1 LUNG NODULE: ICD-10-CM

## 2020-02-19 PROCEDURE — 3074F SYST BP LT 130 MM HG: CPT | Performed by: INTERNAL MEDICINE

## 2020-02-19 PROCEDURE — 99396 PREV VISIT EST AGE 40-64: CPT | Performed by: INTERNAL MEDICINE

## 2020-02-19 PROCEDURE — 3078F DIAST BP <80 MM HG: CPT | Performed by: INTERNAL MEDICINE

## 2020-02-19 NOTE — PROGRESS NOTES
Assessment/Plan:         Diagnoses and all orders for this visit:    Visit for preventive health examination  Discussed setting up eye exam  She will confirm coverage to setup Mammo and repeat CT scan  Discussed smoking cessation She recently stopped drinking when her brother passed from complications of liquor     Anxiety  She has been trying to taper use of alprazolam and does feel better off the etoh    Lung nodule  Due for repeat CT scan of lungs - last 2016 She needs to confirm coverage first and will call us    Rto 6 months         Patient ID: Kamran Alvarez is a 47 y o  female  HPI  Pt feeling better She stopped drinking in December after her brother passed from drinking complications She actually had started drinking more after he passed and stopped altogether in December  She was sick all the time and felt horrible   She feels much better Bowels are normal No abdominal pain or heartburn She is still smoking but will work on cutting back - her  quit smoking She has been eating healthier and trying to drink water mostly She is overdue for eye exam She is not sure if her insurance is active as of today but she will get mammo and repeat ct scan Her stepdaughter passed away recently from cancer       Review of Systems   Constitutional: Negative  HENT: Negative  Respiratory: Negative  Cardiovascular: Negative  Gastrointestinal: Negative  Genitourinary: Negative  Musculoskeletal: Positive for arthralgias  Skin: Negative  Neurological: Negative  Hematological: Negative  Psychiatric/Behavioral: Negative  Feels better since quitting etoh in december       History reviewed  No pertinent past medical history    Past Surgical History:   Procedure Laterality Date    HYSTERECTOMY      TUBAL LIGATION       Social History     Socioeconomic History    Marital status: /Civil Union     Spouse name: Not on file    Number of children: Not on file    Years of education: Not on file    Highest education level: Not on file   Occupational History    Not on file   Social Needs    Financial resource strain: Not on file    Food insecurity:     Worry: Not on file     Inability: Not on file    Transportation needs:     Medical: Not on file     Non-medical: Not on file   Tobacco Use    Smoking status: Current Every Day Smoker    Smokeless tobacco: Never Used   Substance and Sexual Activity    Alcohol use: Not on file    Drug use: No    Sexual activity: Not on file   Lifestyle    Physical activity:     Days per week: Not on file     Minutes per session: Not on file    Stress: Not on file   Relationships    Social connections:     Talks on phone: Not on file     Gets together: Not on file     Attends Episcopalian service: Not on file     Active member of club or organization: Not on file     Attends meetings of clubs or organizations: Not on file     Relationship status: Not on file    Intimate partner violence:     Fear of current or ex partner: Not on file     Emotionally abused: Not on file     Physically abused: Not on file     Forced sexual activity: Not on file   Other Topics Concern    Not on file   Social History Narrative    Dental care, occasionally    Lives independently with spouse    No living will    Sun protection - sunscreen    Uses safety equipment - seatbelts     Allergies   Allergen Reactions    Lorazepam      BMI Counseling: Body mass index is 31 3 kg/m²  The BMI is above normal  Nutrition recommendations include moderation in carbohydrate intake  Exercise recommendations include exercising 3-5 times per week  No pharmacotherapy was ordered  Tobacco Cessation Counseling: Tobacco cessation counseling was provided  The patient is sincerely urged to quit consumption of tobacco  She is not ready to quit tobacco  Medication options not discussed           /78   Pulse 79   Temp 99 7 °F (37 6 °C) (Tympanic)   Ht 5' 4" (1 626 m)   Wt 82 7 kg (182 lb 6 oz) SpO2 98%   BMI 31 30 kg/m²          Physical Exam   Constitutional: She is oriented to person, place, and time  She appears well-developed and well-nourished  No distress  HENT:   Head: Normocephalic and atraumatic  Mouth/Throat: Oropharynx is clear and moist    Eyes: Pupils are equal, round, and reactive to light  Conjunctivae and EOM are normal  No scleral icterus  Neck: Normal range of motion  Neck supple  No JVD present  Cardiovascular: Normal rate and regular rhythm  No murmur heard  Pulmonary/Chest: Effort normal and breath sounds normal  No respiratory distress  She has no wheezes  Abdominal: Soft  Bowel sounds are normal  She exhibits no distension  There is no guarding  Musculoskeletal: Normal range of motion  She exhibits no edema  Lymphadenopathy:     She has no cervical adenopathy  Neurological: She is alert and oriented to person, place, and time  No cranial nerve deficit  Coordination normal    Skin: Skin is warm and dry  Capillary refill takes less than 2 seconds  She is not diaphoretic  Psychiatric: She has a normal mood and affect  Her behavior is normal  Judgment and thought content normal    Nursing note and vitals reviewed

## 2020-02-26 DIAGNOSIS — B37.9 YEAST INFECTION: Primary | ICD-10-CM

## 2020-02-26 DIAGNOSIS — J32.9 SINUSITIS, UNSPECIFIED CHRONICITY, UNSPECIFIED LOCATION: ICD-10-CM

## 2020-02-26 RX ORDER — FLUCONAZOLE 150 MG/1
150 TABLET ORAL ONCE
Qty: 1 TABLET | Refills: 0 | Status: SHIPPED | OUTPATIENT
Start: 2020-02-26 | End: 2020-02-26

## 2020-02-26 RX ORDER — AMOXICILLIN 500 MG/1
500 CAPSULE ORAL EVERY 8 HOURS SCHEDULED
Qty: 21 CAPSULE | Refills: 0 | Status: SHIPPED | OUTPATIENT
Start: 2020-02-26 | End: 2020-03-04

## 2020-03-02 DIAGNOSIS — I10 ESSENTIAL HYPERTENSION: ICD-10-CM

## 2020-03-02 DIAGNOSIS — F41.9 ANXIETY: ICD-10-CM

## 2020-03-03 RX ORDER — CITALOPRAM 20 MG/1
20 TABLET ORAL DAILY
Qty: 90 TABLET | Refills: 3 | Status: SHIPPED | OUTPATIENT
Start: 2020-03-03 | End: 2021-01-21 | Stop reason: SDUPTHER

## 2020-03-03 RX ORDER — METOPROLOL TARTRATE 50 MG/1
50 TABLET, FILM COATED ORAL 2 TIMES DAILY
Qty: 180 TABLET | Refills: 3 | Status: SHIPPED | OUTPATIENT
Start: 2020-03-03 | End: 2021-01-21 | Stop reason: SDUPTHER

## 2020-03-03 RX ORDER — ALPRAZOLAM 0.5 MG/1
0.5 TABLET ORAL
Qty: 30 TABLET | Refills: 0 | Status: SHIPPED | OUTPATIENT
Start: 2020-03-03 | End: 2020-04-02 | Stop reason: SDUPTHER

## 2020-04-02 ENCOUNTER — TELEMEDICINE (OUTPATIENT)
Dept: INTERNAL MEDICINE CLINIC | Facility: CLINIC | Age: 55
End: 2020-04-02

## 2020-04-02 DIAGNOSIS — F41.9 ANXIETY: ICD-10-CM

## 2020-04-02 DIAGNOSIS — I10 ESSENTIAL HYPERTENSION: Primary | ICD-10-CM

## 2020-04-02 PROCEDURE — G2012 BRIEF CHECK IN BY MD/QHP: HCPCS | Performed by: INTERNAL MEDICINE

## 2020-04-02 RX ORDER — ALPRAZOLAM 0.5 MG/1
0.5 TABLET ORAL
Qty: 30 TABLET | Refills: 0 | Status: SHIPPED | OUTPATIENT
Start: 2020-04-02 | End: 2020-04-30 | Stop reason: SDUPTHER

## 2020-04-30 DIAGNOSIS — F41.9 ANXIETY: ICD-10-CM

## 2020-05-01 RX ORDER — ALPRAZOLAM 0.5 MG/1
0.5 TABLET ORAL
Qty: 30 TABLET | Refills: 0 | Status: SHIPPED | OUTPATIENT
Start: 2020-05-01 | End: 2020-05-27 | Stop reason: SDUPTHER

## 2020-05-11 ENCOUNTER — TELEPHONE (OUTPATIENT)
Dept: INTERNAL MEDICINE CLINIC | Facility: CLINIC | Age: 55
End: 2020-05-11

## 2020-05-11 ENCOUNTER — TELEMEDICINE (OUTPATIENT)
Dept: INTERNAL MEDICINE CLINIC | Facility: CLINIC | Age: 55
End: 2020-05-11

## 2020-05-11 DIAGNOSIS — F41.9 ANXIETY: ICD-10-CM

## 2020-05-11 DIAGNOSIS — K04.7 DENTAL ABSCESS: Primary | ICD-10-CM

## 2020-05-11 PROCEDURE — G2012 BRIEF CHECK IN BY MD/QHP: HCPCS | Performed by: INTERNAL MEDICINE

## 2020-05-11 RX ORDER — AMOXICILLIN 500 MG/1
500 CAPSULE ORAL EVERY 8 HOURS SCHEDULED
Qty: 30 CAPSULE | Refills: 0 | Status: SHIPPED | OUTPATIENT
Start: 2020-05-11 | End: 2020-05-21

## 2020-05-21 DIAGNOSIS — B37.9 YEAST INFECTION: Primary | ICD-10-CM

## 2020-05-21 RX ORDER — FLUCONAZOLE 150 MG/1
150 TABLET ORAL ONCE
Qty: 1 TABLET | Refills: 0 | Status: SHIPPED | OUTPATIENT
Start: 2020-05-21 | End: 2020-05-21

## 2020-05-27 ENCOUNTER — TELEMEDICINE (OUTPATIENT)
Dept: INTERNAL MEDICINE CLINIC | Facility: CLINIC | Age: 55
End: 2020-05-27

## 2020-05-27 DIAGNOSIS — Z12.11 COLON CANCER SCREENING: ICD-10-CM

## 2020-05-27 DIAGNOSIS — Z12.31 ENCOUNTER FOR SCREENING MAMMOGRAM FOR MALIGNANT NEOPLASM OF BREAST: ICD-10-CM

## 2020-05-27 DIAGNOSIS — J43.9 PULMONARY EMPHYSEMA, UNSPECIFIED EMPHYSEMA TYPE (HCC): Primary | ICD-10-CM

## 2020-05-27 DIAGNOSIS — K04.7 DENTAL ABSCESS: ICD-10-CM

## 2020-05-27 DIAGNOSIS — F41.9 ANXIETY: ICD-10-CM

## 2020-05-27 PROCEDURE — 99214 OFFICE O/P EST MOD 30 MIN: CPT | Performed by: INTERNAL MEDICINE

## 2020-05-27 RX ORDER — ALPRAZOLAM 0.5 MG/1
0.5 TABLET ORAL
Qty: 30 TABLET | Refills: 0 | Status: SHIPPED | OUTPATIENT
Start: 2020-05-29 | End: 2020-06-29 | Stop reason: SDUPTHER

## 2020-06-12 ENCOUNTER — TELEPHONE (OUTPATIENT)
Dept: INTERNAL MEDICINE CLINIC | Facility: CLINIC | Age: 55
End: 2020-06-12

## 2020-06-29 DIAGNOSIS — F41.9 ANXIETY: ICD-10-CM

## 2020-06-29 RX ORDER — ALPRAZOLAM 0.5 MG/1
0.5 TABLET ORAL
Qty: 30 TABLET | Refills: 0 | Status: SHIPPED | OUTPATIENT
Start: 2020-06-29 | End: 2020-07-28 | Stop reason: SDUPTHER

## 2020-07-08 ENCOUNTER — TELEMEDICINE (OUTPATIENT)
Dept: INTERNAL MEDICINE CLINIC | Facility: CLINIC | Age: 55
End: 2020-07-08
Payer: COMMERCIAL

## 2020-07-08 DIAGNOSIS — R91.1 LUNG NODULE: ICD-10-CM

## 2020-07-08 DIAGNOSIS — R53.83 FATIGUE, UNSPECIFIED TYPE: ICD-10-CM

## 2020-07-08 DIAGNOSIS — I10 ESSENTIAL HYPERTENSION: ICD-10-CM

## 2020-07-08 DIAGNOSIS — J43.9 PULMONARY EMPHYSEMA, UNSPECIFIED EMPHYSEMA TYPE (HCC): Primary | ICD-10-CM

## 2020-07-08 PROCEDURE — G2012 BRIEF CHECK IN BY MD/QHP: HCPCS | Performed by: INTERNAL MEDICINE

## 2020-07-08 NOTE — PROGRESS NOTES
Virtual Brief Visit    Assessment/Plan:    Problem List Items Addressed This Visit        Respiratory    Chronic obstructive airway disease ([address] ) - Primary    Relevant Orders    Comprehensive metabolic panel       Cardiovascular and Mediastinum    Hypertension    Relevant Orders    Comprehensive metabolic panel    Lipid panel       Other    Lung nodule    Relevant Orders    CT chest w contrast    Fatigue    Relevant Orders    CBC and differential    Comprehensive metabolic panel    Lipid panel    Vitamin D 25 hydroxy       Pt now has insurance and wants to schedule mammo and followup CT lung  Smoking cessation encouraged  Rx for fbw to assess fatigue better   She had a sample of stimulant medication her sister got for her but advised her not to continue   Try to taper xanax to half and then taper off  Take citalopram closer to bedtime          Reason for visit is med followup/fatigue  Chief Complaint   Patient presents with    Virtual Brief Visit        Encounter provider Anand Roque DO    Provider located at [address] [place] [id number]    Recent Visits  No visits were found meeting these conditions  Showing recent visits within past 7 days and meeting all other requirements     Today's Visits  Date Type Provider Dept   07/08/20 Telemedicine Anand Roque DO [facility] [address] today's visits and meeting all other requirements     Future Appointments  No visits were found meeting these conditions  Showing future appointments within next 150 days and meeting all other requirements      It was my intent to perform this visit via video technology but the patient was not able to do a video connection so the visit was completed via audio telephone only  After connecting through telephone, the patient was identified by name and date of birth   Mahendra Ozuna was informed that this is a telemedicine visit and that the visit is being conducted through telephone  My office door was closed  No one else was in the room  She acknowledged consent and understanding of privacy and security of the platform  The patient has agreed to participate and understands she can discontinue the visit at any time  Patient is aware this is a billable service  Subjective    Noman Taylor is a 47 y o  female who had had increased fatigue She falls asleep during the 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 caring for her elderly parent   HPI   Pt does have insurance now and wants to get routine tests scheduled She is still smoking No chest pain or sob No cough She is tired and falls asleep easily during the day She is worried She took sample of dextromethorphan/amphetamine from her sister which kept her awake      No past medical history on file  Past Surgical History:   Procedure Laterality Date    HYSTERECTOMY      TUBAL LIGATION         Current Outpatient Medications   Medication Sig Dispense Refill    ALPRAZolam (XANAX) 0 5 mg tablet Take 1 tablet (0 5 mg total) by mouth daily at bedtime as needed for anxiety 30 tablet 0    aspirin (ECOTRIN) 325 mg EC tablet Take by mouth      citalopram (CeleXA) 20 mg tablet Take 1 tablet (20 mg total) by mouth daily 90 tablet 3    metoprolol tartrate (LOPRESSOR) 50 mg tablet Take 1 tablet (50 mg total) by mouth 2 (two) times a day 180 tablet 3     No current facility-administered medications for this visit  Allergies   Allergen Reactions    Lorazepam        Review of Systems   Constitutional: Positive for activity change and fatigue  Negative for chills, diaphoresis and fever  HENT: Negative  Respiratory: Positive for shortness of breath  Negative for cough  Cardiovascular: Negative for chest pain and palpitations  Genitourinary: Negative  Musculoskeletal: Positive for arthralgias  Neurological: Negative for dizziness, light-headedness and headaches  Psychiatric/Behavioral: Positive for sleep disturbance  There were no vitals filed for this visit  I spent 10 minutes directly with the patient during this visit    VIRTUAL VISIT DISCLAIMER    Nigel Or acknowledges that she has consented to an online visit or consultation  She understands that the online visit is based solely on information provided by her, and that, in the absence of a face-to-face physical evaluation by the physician, the diagnosis she receives is both limited and provisional in terms of accuracy and completeness  This is not intended to replace a full medical face-to-face evaluation by the physician  Nigel Or understands and accepts these terms

## 2020-07-09 ENCOUNTER — APPOINTMENT (OUTPATIENT)
Dept: LAB | Facility: MEDICAL CENTER | Age: 55
End: 2020-07-09
Payer: COMMERCIAL

## 2020-07-09 DIAGNOSIS — R53.83 FATIGUE, UNSPECIFIED TYPE: ICD-10-CM

## 2020-07-09 DIAGNOSIS — J43.9 PULMONARY EMPHYSEMA, UNSPECIFIED EMPHYSEMA TYPE (HCC): ICD-10-CM

## 2020-07-09 DIAGNOSIS — I10 ESSENTIAL HYPERTENSION: ICD-10-CM

## 2020-07-09 DIAGNOSIS — E78.2 MIXED HYPERLIPIDEMIA: ICD-10-CM

## 2020-07-09 LAB
25(OH)D3 SERPL-MCNC: 18.5 NG/ML (ref 30–100)
ALBUMIN SERPL BCP-MCNC: 3.5 G/DL (ref 3.5–5)
ALP SERPL-CCNC: 127 U/L (ref 46–116)
ALT SERPL W P-5'-P-CCNC: 28 U/L (ref 12–78)
ANION GAP SERPL CALCULATED.3IONS-SCNC: 6 MMOL/L (ref 4–13)
AST SERPL W P-5'-P-CCNC: 13 U/L (ref 5–45)
BASOPHILS # BLD AUTO: 0.09 THOUSANDS/ΜL (ref 0–0.1)
BASOPHILS NFR BLD AUTO: 1 % (ref 0–1)
BILIRUB SERPL-MCNC: 0.49 MG/DL (ref 0.2–1)
BUN SERPL-MCNC: 7 MG/DL (ref 5–25)
CALCIUM SERPL-MCNC: 9.3 MG/DL (ref 8.3–10.1)
CHLORIDE SERPL-SCNC: 107 MMOL/L (ref 100–108)
CHOLEST SERPL-MCNC: 248 MG/DL (ref 50–200)
CO2 SERPL-SCNC: 26 MMOL/L (ref 21–32)
CREAT SERPL-MCNC: 1 MG/DL (ref 0.6–1.3)
EOSINOPHIL # BLD AUTO: 0.55 THOUSAND/ΜL (ref 0–0.61)
EOSINOPHIL NFR BLD AUTO: 5 % (ref 0–6)
ERYTHROCYTE [DISTWIDTH] IN BLOOD BY AUTOMATED COUNT: 13.1 % (ref 11.6–15.1)
GFR SERPL CREATININE-BSD FRML MDRD: 64 ML/MIN/1.73SQ M
GLUCOSE P FAST SERPL-MCNC: 92 MG/DL (ref 65–99)
HCT VFR BLD AUTO: 50.3 % (ref 34.8–46.1)
HDLC SERPL-MCNC: 47 MG/DL
HGB BLD-MCNC: 16.3 G/DL (ref 11.5–15.4)
IMM GRANULOCYTES # BLD AUTO: 0.03 THOUSAND/UL (ref 0–0.2)
IMM GRANULOCYTES NFR BLD AUTO: 0 % (ref 0–2)
IRON SERPL-MCNC: 97 UG/DL (ref 50–170)
LDLC SERPL CALC-MCNC: 167 MG/DL (ref 0–100)
LYMPHOCYTES # BLD AUTO: 2.92 THOUSANDS/ΜL (ref 0.6–4.47)
LYMPHOCYTES NFR BLD AUTO: 28 % (ref 14–44)
MCH RBC QN AUTO: 29.1 PG (ref 26.8–34.3)
MCHC RBC AUTO-ENTMCNC: 32.4 G/DL (ref 31.4–37.4)
MCV RBC AUTO: 90 FL (ref 82–98)
MONOCYTES # BLD AUTO: 0.57 THOUSAND/ΜL (ref 0.17–1.22)
MONOCYTES NFR BLD AUTO: 5 % (ref 4–12)
NEUTROPHILS # BLD AUTO: 6.32 THOUSANDS/ΜL (ref 1.85–7.62)
NEUTS SEG NFR BLD AUTO: 61 % (ref 43–75)
NONHDLC SERPL-MCNC: 201 MG/DL
NRBC BLD AUTO-RTO: 0 /100 WBCS
PLATELET # BLD AUTO: 328 THOUSANDS/UL (ref 149–390)
PMV BLD AUTO: 11 FL (ref 8.9–12.7)
POTASSIUM SERPL-SCNC: 4.2 MMOL/L (ref 3.5–5.3)
PROT SERPL-MCNC: 7.6 G/DL (ref 6.4–8.2)
RBC # BLD AUTO: 5.61 MILLION/UL (ref 3.81–5.12)
SODIUM SERPL-SCNC: 139 MMOL/L (ref 136–145)
TRIGL SERPL-MCNC: 168 MG/DL
VIT B12 SERPL-MCNC: 241 PG/ML (ref 100–900)
WBC # BLD AUTO: 10.48 THOUSAND/UL (ref 4.31–10.16)

## 2020-07-09 PROCEDURE — 82607 VITAMIN B-12: CPT

## 2020-07-09 PROCEDURE — 82306 VITAMIN D 25 HYDROXY: CPT

## 2020-07-09 PROCEDURE — 80061 LIPID PANEL: CPT

## 2020-07-09 PROCEDURE — 83540 ASSAY OF IRON: CPT

## 2020-07-09 PROCEDURE — 85025 COMPLETE CBC W/AUTO DIFF WBC: CPT

## 2020-07-09 PROCEDURE — 80053 COMPREHEN METABOLIC PANEL: CPT

## 2020-07-09 PROCEDURE — 36415 COLL VENOUS BLD VENIPUNCTURE: CPT

## 2020-07-10 ENCOUNTER — TELEPHONE (OUTPATIENT)
Dept: INTERNAL MEDICINE CLINIC | Facility: CLINIC | Age: 55
End: 2020-07-10

## 2020-07-10 DIAGNOSIS — E55.9 VITAMIN D DEFICIENCY: Primary | ICD-10-CM

## 2020-07-10 DIAGNOSIS — E78.2 MIXED HYPERLIPIDEMIA: ICD-10-CM

## 2020-07-10 RX ORDER — ATORVASTATIN CALCIUM 10 MG/1
10 TABLET, FILM COATED ORAL DAILY
Qty: 30 TABLET | Refills: 5 | Status: SHIPPED | OUTPATIENT
Start: 2020-07-10 | End: 2021-01-11 | Stop reason: SDUPTHER

## 2020-07-10 RX ORDER — ERGOCALCIFEROL 1.25 MG/1
50000 CAPSULE ORAL WEEKLY
Qty: 12 CAPSULE | Refills: 3 | Status: SHIPPED | OUTPATIENT
Start: 2020-07-10 | End: 2021-06-07

## 2020-07-10 NOTE — TELEPHONE ENCOUNTER
----- Message from Pervis Primrose, DO sent at 7/9/2020  8:02 PM EDT -----  Cholesterol high recommend atorvastatin 10mg daily  Vitamin d low start vit d3 [id number] iu weekly  Recommend b complex supplement daily - vit b level normal range but low normal range

## 2020-07-13 ENCOUNTER — TELEPHONE (OUTPATIENT)
Dept: INTERNAL MEDICINE CLINIC | Facility: CLINIC | Age: 55
End: 2020-07-13

## 2020-07-15 ENCOUNTER — TELEPHONE (OUTPATIENT)
Dept: INTERNAL MEDICINE CLINIC | Facility: CLINIC | Age: 55
End: 2020-07-15

## 2020-07-15 NOTE — TELEPHONE ENCOUNTER
Pt informed- w/c if still having sxs - given mammo appt/instructions  Will stay hydrated in the hot weather   Mailed pt low chol diet info

## 2020-07-15 NOTE — TELEPHONE ENCOUNTER
Would hold the cholesterol medicine for the week and see if it improves  Make sure staying hydrated as well If no change will need to setup appt

## 2020-07-17 ENCOUNTER — TELEPHONE (OUTPATIENT)
Dept: INTERNAL MEDICINE CLINIC | Facility: CLINIC | Age: 55
End: 2020-07-17

## 2020-07-17 NOTE — TELEPHONE ENCOUNTER
Patient asking what time of day she should be taking her Metoprolol? States in the evenings her BP tends to go up higher then AM   Also asking if she should resume her Atorvastatin now, was on hold for a week due to BP issues?

## 2020-07-22 ENCOUNTER — TELEPHONE (OUTPATIENT)
Dept: INTERNAL MEDICINE CLINIC | Facility: CLINIC | Age: 55
End: 2020-07-22

## 2020-07-22 ENCOUNTER — CLINICAL SUPPORT (OUTPATIENT)
Dept: INTERNAL MEDICINE CLINIC | Facility: CLINIC | Age: 55
End: 2020-07-22
Payer: COMMERCIAL

## 2020-07-22 VITALS — TEMPERATURE: 100.3 F | SYSTOLIC BLOOD PRESSURE: 128 MMHG | DIASTOLIC BLOOD PRESSURE: 88 MMHG

## 2020-07-22 DIAGNOSIS — I10 ESSENTIAL HYPERTENSION: ICD-10-CM

## 2020-07-22 PROCEDURE — 99211 OFF/OP EST MAY X REQ PHY/QHP: CPT

## 2020-07-22 NOTE — TELEPHONE ENCOUNTER
----- Message from Robbi Rojas MA sent at 7/22/2020  7:20 AM EDT -----  Is patient having this CT done?      ----- Message -----  From: SYSTEM  Sent: 7/22/2020  12:12 AM EDT  To: [facility]

## 2020-07-22 NOTE — PROGRESS NOTES
Patient presents for BP check  Patient feels well and without complaints other then just having shingles vaccine yesterday and having slight temp and local reaction on her arm  Denies headaches, dizziness or high Bps at home  Today in office 128/88

## 2020-07-24 ENCOUNTER — HOSPITAL ENCOUNTER (OUTPATIENT)
Dept: MAMMOGRAPHY | Facility: HOSPITAL | Age: 55
Discharge: HOME | End: 2020-07-24
Attending: INTERNAL MEDICINE
Payer: COMMERCIAL

## 2020-07-24 VITALS — BODY MASS INDEX: 31.41 KG/M2 | HEIGHT: 64 IN | WEIGHT: 184 LBS

## 2020-07-24 DIAGNOSIS — Z12.31 ENCOUNTER FOR SCREENING MAMMOGRAM FOR MALIGNANT NEOPLASM OF BREAST: ICD-10-CM

## 2020-07-24 PROCEDURE — 77067 SCR MAMMO BI INCL CAD: CPT

## 2020-07-24 PROCEDURE — 77063 BREAST TOMOSYNTHESIS BI: CPT

## 2020-07-27 ENCOUNTER — TELEPHONE (OUTPATIENT)
Dept: INTERNAL MEDICINE CLINIC | Facility: CLINIC | Age: 55
End: 2020-07-27

## 2020-07-27 DIAGNOSIS — R05.9 COUGH: Primary | ICD-10-CM

## 2020-07-27 DIAGNOSIS — R91.1 LUNG NODULE: ICD-10-CM

## 2020-07-27 NOTE — TELEPHONE ENCOUNTER
Someone scheduled pt for the ct chest - 2nd denial received  pls advise next option - message out to pt to return call regarding

## 2020-07-27 NOTE — TELEPHONE ENCOUNTER
Will need to setup appt for patient - look in August  She can get the study thru the tobacco use screening - the cost I think is around 60 00

## 2020-07-28 DIAGNOSIS — F41.9 ANXIETY: ICD-10-CM

## 2020-07-28 RX ORDER — ALPRAZOLAM 0.5 MG/1
0.5 TABLET ORAL
Qty: 30 TABLET | Refills: 0 | Status: SHIPPED | OUTPATIENT
Start: 2020-07-28 | End: 2020-09-21 | Stop reason: DRUGHIGH

## 2020-08-13 ENCOUNTER — TELEPHONE (OUTPATIENT)
Dept: INTERNAL MEDICINE CLINIC | Facility: CLINIC | Age: 55
End: 2020-08-13

## 2020-08-13 ENCOUNTER — HOSPITAL ENCOUNTER (OUTPATIENT)
Dept: ULTRASOUND IMAGING | Facility: HOSPITAL | Age: 55
Discharge: HOME | End: 2020-08-13
Attending: INTERNAL MEDICINE
Payer: COMMERCIAL

## 2020-08-13 DIAGNOSIS — R92.8 ABNORMAL MAMMOGRAM: Primary | ICD-10-CM

## 2020-08-13 DIAGNOSIS — R92.8 ABNORMAL MAMMOGRAM: ICD-10-CM

## 2020-08-13 PROCEDURE — 76642 ULTRASOUND BREAST LIMITED: CPT

## 2020-08-13 NOTE — TELEPHONE ENCOUNTER
----- Message from Babs Birmingham DO sent at 8/13/2020  1:49 PM EDT -----  Probable benign breast findings recommend us and diagnostic mammo in 6 months

## 2020-08-20 ENCOUNTER — TELEPHONE (OUTPATIENT)
Dept: INTERNAL MEDICINE CLINIC | Facility: CLINIC | Age: 55
End: 2020-08-20

## 2020-08-20 DIAGNOSIS — B37.9 YEAST INFECTION: Primary | ICD-10-CM

## 2020-08-20 RX ORDER — FLUCONAZOLE 150 MG/1
150 TABLET ORAL ONCE
Qty: 1 TABLET | Refills: 0 | Status: SHIPPED | OUTPATIENT
Start: 2020-08-20 | End: 2020-08-20

## 2020-08-21 ENCOUNTER — OFFICE VISIT (OUTPATIENT)
Dept: INTERNAL MEDICINE CLINIC | Facility: CLINIC | Age: 55
End: 2020-08-21
Payer: COMMERCIAL

## 2020-08-21 VITALS
OXYGEN SATURATION: 97 % | HEIGHT: 64 IN | BODY MASS INDEX: 30.73 KG/M2 | HEART RATE: 88 BPM | TEMPERATURE: 97.5 F | WEIGHT: 180 LBS

## 2020-08-21 DIAGNOSIS — J43.9 PULMONARY EMPHYSEMA, UNSPECIFIED EMPHYSEMA TYPE (HCC): ICD-10-CM

## 2020-08-21 DIAGNOSIS — R92.8 ABNORMAL MAMMOGRAM: ICD-10-CM

## 2020-08-21 DIAGNOSIS — I10 ESSENTIAL HYPERTENSION: Primary | ICD-10-CM

## 2020-08-21 DIAGNOSIS — F41.9 ANXIETY: ICD-10-CM

## 2020-08-21 PROBLEM — R53.83 FATIGUE: Status: RESOLVED | Noted: 2020-07-08 | Resolved: 2020-08-21

## 2020-08-21 PROCEDURE — 3074F SYST BP LT 130 MM HG: CPT | Performed by: INTERNAL MEDICINE

## 2020-08-21 PROCEDURE — 3725F SCREEN DEPRESSION PERFORMED: CPT | Performed by: INTERNAL MEDICINE

## 2020-08-21 PROCEDURE — 3008F BODY MASS INDEX DOCD: CPT | Performed by: INTERNAL MEDICINE

## 2020-08-21 PROCEDURE — 4004F PT TOBACCO SCREEN RCVD TLK: CPT | Performed by: INTERNAL MEDICINE

## 2020-08-21 PROCEDURE — 99213 OFFICE O/P EST LOW 20 MIN: CPT | Performed by: INTERNAL MEDICINE

## 2020-08-21 PROCEDURE — 3079F DIAST BP 80-89 MM HG: CPT | Performed by: INTERNAL MEDICINE

## 2020-08-21 RX ORDER — ALPRAZOLAM 0.5 MG/1
0.5 TABLET ORAL 2 TIMES DAILY PRN
Qty: 60 TABLET | Refills: 0 | Status: SHIPPED | OUTPATIENT
Start: 2020-08-21 | End: 2020-09-21 | Stop reason: SDUPTHER
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
## 2020-09-21 DIAGNOSIS — F41.9 ANXIETY: ICD-10-CM

## 2020-09-21 RX ORDER — ALPRAZOLAM 0.5 MG/1
0.5 TABLET ORAL 2 TIMES DAILY PRN
Qty: 60 TABLET | Refills: 0 | Status: SHIPPED | OUTPATIENT
Start: 2020-09-21 | End: 2020-10-21 | Stop reason: SDUPTHER

## 2020-10-01 DIAGNOSIS — R05.9 COUGH: Primary | ICD-10-CM

## 2020-10-01 RX ORDER — AZITHROMYCIN 250 MG/1
TABLET, FILM COATED ORAL
Qty: 6 TABLET | Refills: 0 | Status: SHIPPED | OUTPATIENT
Start: 2020-10-01 | End: 2020-10-05

## 2020-10-21 ENCOUNTER — OFFICE VISIT (OUTPATIENT)
Dept: INTERNAL MEDICINE CLINIC | Facility: CLINIC | Age: 55
End: 2020-10-21
Payer: COMMERCIAL

## 2020-10-21 VITALS
SYSTOLIC BLOOD PRESSURE: 124 MMHG | HEIGHT: 64 IN | TEMPERATURE: 96.9 F | WEIGHT: 172.38 LBS | DIASTOLIC BLOOD PRESSURE: 78 MMHG | BODY MASS INDEX: 29.43 KG/M2

## 2020-10-21 DIAGNOSIS — R92.8 ABNORMAL MAMMOGRAM: ICD-10-CM

## 2020-10-21 DIAGNOSIS — H25.9 AGE-RELATED CATARACT OF BOTH EYES, UNSPECIFIED AGE-RELATED CATARACT TYPE: ICD-10-CM

## 2020-10-21 DIAGNOSIS — J43.9 PULMONARY EMPHYSEMA, UNSPECIFIED EMPHYSEMA TYPE (HCC): Primary | ICD-10-CM

## 2020-10-21 DIAGNOSIS — F41.9 ANXIETY: ICD-10-CM

## 2020-10-21 DIAGNOSIS — I10 ESSENTIAL HYPERTENSION: ICD-10-CM

## 2020-10-21 PROBLEM — B02.9 HERPES ZOSTER WITHOUT COMPLICATION: Status: RESOLVED | Noted: 2019-08-05 | Resolved: 2020-10-21

## 2020-10-21 PROBLEM — K04.7 DENTAL ABSCESS: Status: RESOLVED | Noted: 2020-05-11 | Resolved: 2020-10-21

## 2020-10-21 PROCEDURE — 99214 OFFICE O/P EST MOD 30 MIN: CPT | Performed by: INTERNAL MEDICINE

## 2020-10-21 PROCEDURE — 4004F PT TOBACCO SCREEN RCVD TLK: CPT | Performed by: INTERNAL MEDICINE

## 2020-10-21 PROCEDURE — 3078F DIAST BP <80 MM HG: CPT | Performed by: INTERNAL MEDICINE

## 2020-10-21 PROCEDURE — 3074F SYST BP LT 130 MM HG: CPT | Performed by: INTERNAL MEDICINE

## 2020-10-21 RX ORDER — ALPRAZOLAM 0.5 MG/1
0.5 TABLET ORAL 2 TIMES DAILY PRN
Qty: 60 TABLET | Refills: 0 | Status: SHIPPED | OUTPATIENT
Start: 2020-10-21 | End: 2020-11-19 | Stop reason: SDUPTHER

## 2020-11-06 ENCOUNTER — HOSPITAL ENCOUNTER (OUTPATIENT)
Dept: NON INVASIVE DIAGNOSTICS | Facility: HOSPITAL | Age: 55
Discharge: HOME | End: 2020-11-06
Payer: COMMERCIAL

## 2020-11-06 ENCOUNTER — TRANSCRIBE ORDERS (OUTPATIENT)
Dept: ADMINISTRATIVE | Facility: HOSPITAL | Age: 55
End: 2020-11-06

## 2020-11-06 DIAGNOSIS — H25.13 NUCLEAR SCLEROTIC CATARACT OF BOTH EYES: ICD-10-CM

## 2020-11-06 DIAGNOSIS — Z01.818 PRE-OP TESTING: ICD-10-CM

## 2020-11-06 DIAGNOSIS — Z01.818 PRE-OP TESTING: Primary | ICD-10-CM

## 2020-11-06 PROCEDURE — 93005 ELECTROCARDIOGRAM TRACING: CPT

## 2020-11-09 LAB
ATRIAL RATE: 72 BPM
P AXIS: 79 DEGREES
PR INTERVAL: 154 MS
QRS AXIS: 76 DEGREES
QRSD INTERVAL: 80 MS
QT INTERVAL: 406 MS
QTC INTERVAL: 444 MS
T WAVE AXIS: 81 DEGREES
VENTRICULAR RATE: 72 BPM

## 2020-11-09 PROCEDURE — 93010 ELECTROCARDIOGRAM REPORT: CPT | Performed by: INTERNAL MEDICINE

## 2020-11-10 ENCOUNTER — TELEPHONE (OUTPATIENT)
Dept: INTERNAL MEDICINE CLINIC | Facility: CLINIC | Age: 55
End: 2020-11-10

## 2020-11-18 ENCOUNTER — TELEPHONE (OUTPATIENT)
Dept: INTERNAL MEDICINE CLINIC | Facility: CLINIC | Age: 55
End: 2020-11-18

## 2020-11-19 DIAGNOSIS — F41.9 ANXIETY: ICD-10-CM

## 2020-11-19 RX ORDER — ALPRAZOLAM 0.5 MG/1
0.5 TABLET ORAL 2 TIMES DAILY PRN
Qty: 60 TABLET | Refills: 0 | Status: SHIPPED | OUTPATIENT
Start: 2020-11-20 | End: 2020-12-21 | Stop reason: SDUPTHER

## 2020-12-21 DIAGNOSIS — F41.9 ANXIETY: ICD-10-CM

## 2020-12-22 RX ORDER — ALPRAZOLAM 0.5 MG/1
0.5 TABLET ORAL 2 TIMES DAILY PRN
Qty: 60 TABLET | Refills: 0 | Status: SHIPPED | OUTPATIENT
Start: 2020-12-22 | End: 2021-01-20 | Stop reason: SDUPTHER

## 2021-01-11 DIAGNOSIS — E78.2 MIXED HYPERLIPIDEMIA: ICD-10-CM

## 2021-01-11 RX ORDER — ATORVASTATIN CALCIUM 10 MG/1
10 TABLET, FILM COATED ORAL DAILY
Qty: 30 TABLET | Refills: 5 | Status: SHIPPED | OUTPATIENT
Start: 2021-01-11 | End: 2021-07-07 | Stop reason: SDUPTHER

## 2021-01-14 DIAGNOSIS — B37.9 YEAST INFECTION: ICD-10-CM

## 2021-01-14 DIAGNOSIS — B34.9 VIRAL INFECTION, UNSPECIFIED: Primary | ICD-10-CM

## 2021-01-14 DIAGNOSIS — B34.9 VIRAL INFECTION, UNSPECIFIED: ICD-10-CM

## 2021-01-14 DIAGNOSIS — J32.9 SINUSITIS, UNSPECIFIED CHRONICITY, UNSPECIFIED LOCATION: Primary | ICD-10-CM

## 2021-01-14 PROCEDURE — U0003 INFECTIOUS AGENT DETECTION BY NUCLEIC ACID (DNA OR RNA); SEVERE ACUTE RESPIRATORY SYNDROME CORONAVIRUS 2 (SARS-COV-2) (CORONAVIRUS DISEASE [COVID-19]), AMPLIFIED PROBE TECHNIQUE, MAKING USE OF HIGH THROUGHPUT TECHNOLOGIES AS DESCRIBED BY CMS-2020-01-R: HCPCS | Performed by: INTERNAL MEDICINE

## 2021-01-14 PROCEDURE — U0005 INFEC AGEN DETEC AMPLI PROBE: HCPCS | Performed by: INTERNAL MEDICINE

## 2021-01-14 RX ORDER — AZITHROMYCIN 250 MG/1
TABLET, FILM COATED ORAL
Qty: 6 TABLET | Refills: 0 | Status: SHIPPED | OUTPATIENT
Start: 2021-01-14 | End: 2021-01-18

## 2021-01-14 RX ORDER — FLUCONAZOLE 150 MG/1
150 TABLET ORAL ONCE
Qty: 1 TABLET | Refills: 0 | Status: SHIPPED | OUTPATIENT
Start: 2021-01-14 | End: 2021-01-14

## 2021-01-14 NOTE — TELEPHONE ENCOUNTER
Pt will go for test  She also asked for yeast infection med as she said she always gets one from [facility]

## 2021-01-14 NOTE — TELEPHONE ENCOUNTER
Pt has a fever, sore throat, nose congestion, achy  This all started yesterday  She said she is home not working  She does go to the stores however  Please advise          Allergies  Lorazepam    Joaoe-Jona bangura

## 2021-01-16 LAB — SARS-COV-2 RNA SPEC QL NAA+PROBE: NOT DETECTED

## 2021-01-20 DIAGNOSIS — F41.9 ANXIETY: ICD-10-CM

## 2021-01-20 RX ORDER — ALPRAZOLAM 0.5 MG/1
0.5 TABLET ORAL 2 TIMES DAILY PRN
Qty: 60 TABLET | Refills: 0 | Status: SHIPPED | OUTPATIENT
Start: 2021-01-22 | End: 2021-02-18 | Stop reason: SDUPTHER

## 2021-01-21 ENCOUNTER — OFFICE VISIT (OUTPATIENT)
Dept: INTERNAL MEDICINE CLINIC | Facility: CLINIC | Age: 56
End: 2021-01-21
Payer: COMMERCIAL

## 2021-01-21 ENCOUNTER — TELEPHONE (OUTPATIENT)
Dept: INTERNAL MEDICINE CLINIC | Facility: CLINIC | Age: 56
End: 2021-01-21

## 2021-01-21 VITALS — WEIGHT: 180.25 LBS | BODY MASS INDEX: 30.77 KG/M2 | HEIGHT: 64 IN | TEMPERATURE: 96.7 F

## 2021-01-21 DIAGNOSIS — L72.9 CYST OF SKIN: Primary | ICD-10-CM

## 2021-01-21 DIAGNOSIS — L72.3 SEBACEOUS CYST: ICD-10-CM

## 2021-01-21 DIAGNOSIS — I10 ESSENTIAL HYPERTENSION: ICD-10-CM

## 2021-01-21 DIAGNOSIS — Z23 IMMUNIZATION DUE: ICD-10-CM

## 2021-01-21 DIAGNOSIS — K21.9 GASTROESOPHAGEAL REFLUX DISEASE, UNSPECIFIED WHETHER ESOPHAGITIS PRESENT: ICD-10-CM

## 2021-01-21 DIAGNOSIS — J43.9 PULMONARY EMPHYSEMA, UNSPECIFIED EMPHYSEMA TYPE (HCC): Primary | ICD-10-CM

## 2021-01-21 DIAGNOSIS — Z12.11 COLON CANCER SCREENING: ICD-10-CM

## 2021-01-21 PROCEDURE — 99214 OFFICE O/P EST MOD 30 MIN: CPT | Performed by: INTERNAL MEDICINE

## 2021-01-21 PROCEDURE — 90670 PCV13 VACCINE IM: CPT | Performed by: INTERNAL MEDICINE

## 2021-01-21 PROCEDURE — 90471 IMMUNIZATION ADMIN: CPT | Performed by: INTERNAL MEDICINE

## 2021-01-21 PROCEDURE — 4004F PT TOBACCO SCREEN RCVD TLK: CPT | Performed by: INTERNAL MEDICINE

## 2021-01-21 RX ORDER — CITALOPRAM 20 MG/1
20 TABLET ORAL DAILY
Qty: 90 TABLET | Refills: 3 | Status: SHIPPED | OUTPATIENT
Start: 2021-01-21 | End: 2022-03-01 | Stop reason: SDUPTHER

## 2021-01-21 RX ORDER — METOPROLOL TARTRATE 50 MG/1
50 TABLET, FILM COATED ORAL EVERY 12 HOURS SCHEDULED
Qty: 180 TABLET | Refills: 3 | Status: SHIPPED | OUTPATIENT
Start: 2021-01-21 | End: 2022-03-01 | Stop reason: SDUPTHER

## 2021-01-21 NOTE — PROGRESS NOTES
Assessment/Plan:         Diagnoses and all orders for this visit:    Pulmonary emphysema, unspecified emphysema type ([facility] [address] )  Smoking cessation encouraged Pt has been smoking less       Colon cancer screening  -     Cologuard; Future  Agrees to cologard    Essential hypertension  -     citalopram (CeleXA) 20 mg tablet; Take 1 tablet (20 mg total) by mouth daily  -     metoprolol tartrate (LOPRESSOR) 50 mg tablet; Take 1 tablet (50 mg total) by mouth every 12 (twelve) hours  Bp stable on current regimen    Gastroesophageal reflux disease, unspecified whether esophagitis present  Sxs stable Reviewed diet and she is working on weight loss  Sebaceous cyst  Cyst upper right axilla referral to surgery    Immunization due  -     PNEUMOCOCCAL CONJUGATE VACCINE 13-VALENT GREATER THAN 6 MONTHS      Rto 3months      Patient ID: Taiwo Garcia is a 54 y o  female  HPI  Pt doing ok She is feeling better after recent URI Covid negative She has been very careful caden since she cares for her Mom She has a cyst right underarm that does bother her and get caught on clothes She is trying to lose weight and has cut out junk food Still smoking No chest pain or sob     Review of Systems   Constitutional: Negative for activity change, chills, fatigue and fever  HENT: Positive for congestion  Eyes: Negative for visual disturbance  Happy post cataract sx   Respiratory: Negative for cough, chest tightness and shortness of breath  Cardiovascular: Negative for chest pain, palpitations and leg swelling  Gastrointestinal: Negative for abdominal distention, abdominal pain, constipation and diarrhea  Genitourinary: Negative for difficulty urinating  Musculoskeletal: Negative for arthralgias  Skin:        Lesion right underarm   Neurological: Negative for dizziness, light-headedness and headaches  Psychiatric/Behavioral: Negative for sleep disturbance  The patient is not nervous/anxious  History reviewed   No pertinent past medical history  Past Surgical History:   Procedure Laterality Date    BREAST EXCISIONAL BIOPSY Right     benign    BREAST EXCISIONAL BIOPSY Left     benign    CATARACT EXTRACTION, BILATERAL      HYSTERECTOMY      TUBAL LIGATION       Social History     Socioeconomic History    Marital status: /Civil Union     Spouse name: Not on file    Number of children: Not on file    Years of education: Not on file    Highest education level: Not on file   Occupational History    Not on file   Social Needs    Financial resource strain: Not on file    Food insecurity     Worry: Not on file     Inability: Not on file   [employer] needs     Medical: Not on file     Non-medical: Not on file   Tobacco Use    Smoking status: Current Every Day Smoker     Packs/day: 2 00     Years: 40 00     Pack years: 80 00    Smokeless tobacco: Never Used   Substance and Sexual Activity    Alcohol use: Not on file    Drug use: No    Sexual activity: Not on file   Lifestyle    Physical activity     Days per week: Not on file     Minutes per session: Not on file    Stress: Not on file   Relationships    Social connections     Talks on phone: Not on file     Gets together: Not on file     Attends Episcopal service: Not on file     Active member of club or organization: Not on file     Attends meetings of clubs or organizations: Not on file     Relationship status: Not on file    Intimate partner violence     Fear of current or ex partner: Not on file     Emotionally abused: Not on file     Physically abused: Not on file     Forced sexual activity: Not on file   Other Topics Concern    Not on file   Social History Narrative    Dental care, occasionally    Lives independently with spouse    No living will    Sun protection - sunscreen    Uses safety equipment - seatbelts     Allergies   Allergen Reactions    Lorazepam      BMI Counseling: Body mass index is 30 94 kg/m²   The BMI is above normal  Nutrition recommendations include consuming healthier snacks and moderation in carbohydrate intake  Exercise recommendations include exercising 3-5 times per week  No pharmacotherapy was ordered  Tobacco Cessation Counseling: Tobacco cessation counseling was provided  The patient is sincerely urged to quit consumption of tobacco  She is not ready to quit tobacco  Medication options not discussed             Temp (!) 96 7 °F (35 9 °C) (Temporal)   Ht 5' 4" (1 626 m)   Wt 81 8 kg (180 lb 4 oz)   BMI 30 94 kg/m²          Physical Exam

## 2021-01-28 ENCOUNTER — TELEPHONE (OUTPATIENT)
Dept: PULMONOLOGY | Facility: CLINIC | Age: 56
End: 2021-01-28

## 2021-01-28 ENCOUNTER — CONSULT (OUTPATIENT)
Dept: SURGERY | Facility: CLINIC | Age: 56
End: 2021-01-28
Payer: COMMERCIAL

## 2021-01-28 VITALS
HEIGHT: 64 IN | HEART RATE: 89 BPM | SYSTOLIC BLOOD PRESSURE: 124 MMHG | DIASTOLIC BLOOD PRESSURE: 83 MMHG | BODY MASS INDEX: 31.07 KG/M2 | WEIGHT: 182 LBS | TEMPERATURE: 98.5 F

## 2021-01-28 DIAGNOSIS — L72.9 CYST OF SKIN: ICD-10-CM

## 2021-01-28 PROCEDURE — 3008F BODY MASS INDEX DOCD: CPT | Performed by: INTERNAL MEDICINE

## 2021-01-28 PROCEDURE — 99243 OFF/OP CNSLTJ NEW/EST LOW 30: CPT | Performed by: SURGERY

## 2021-01-29 PROBLEM — L72.9 CYST OF SKIN: Status: ACTIVE | Noted: 2021-01-29

## 2021-01-29 NOTE — PROGRESS NOTES
Assessment/Plan:    Cyst of skin   Symptomatic cyst of the right axilla  Patient like to have this removed  I think this is reasonable  The cyst is approximately 1 cm in size and these can be removed in the office  We will set the patient up for an office visit next week to have this removed  Patient is agreeable  Diagnoses and all orders for this visit:    Cyst of skin  -     Ambulatory referral to General Surgery          Subjective:      Patient ID: Coleen Riggs is a 54 y o  female  26-year-old female with known COPD, hypertension, GERD, presents to the office in consultation for evaluation of a subcutaneous mass likely cyst of the right axilla  Patient states she has had this for some time  It may have grown in size but regardless  It has started to really bother her  No significant tenderness but definitely year gets irritated with certain clothing that she wears  Denies any history of infection  Currently denies any redness or drainage from that area  No other associated symptoms  No nausea vomiting  No fevers or chills  No other similar masses or lesions in the past       The following portions of the patient's history were reviewed and updated as appropriate: She  has no past medical history on file  She   Patient Active Problem List    Diagnosis Date Noted    Cyst of skin 01/29/2021    Sebaceous cyst 01/21/2021    Age-related cataract of both eyes 10/21/2020    Abnormal mammogram 08/21/2020    Visit for preventive health examination 02/23/2019    Hyperlipidemia 10/06/2016    Vitamin D deficiency 10/06/2016    Lung nodule 10/03/2016    Chronic obstructive airway disease ([facility] [address] ) 09/19/2016    Esophageal reflux 09/19/2016    Allergic rhinitis 03/14/2014    Benign polycythemia 07/16/2013    Anxiety 08/08/2012    Hypertension 08/08/2012     She  has a past surgical history that includes Hysterectomy;  Tubal ligation; Breast excisional biopsy (Right); Breast excisional biopsy (Left); and Cataract extraction, bilateral   Her family history includes Bone cancer in her sister; Cancer in her maternal grandmother; Coronary artery disease in her father; Diabetes in her daughter and sister; Glaucoma in her father; Heart disease in her sister; Hypertension in her father and mother; Lung cancer in her sister; Lung disease in her sister; No Known Problems in her maternal aunt, maternal aunt, paternal aunt, paternal aunt, paternal aunt, paternal aunt, paternal aunt, paternal aunt, paternal aunt, paternal aunt, paternal aunt, paternal aunt, paternal grandfather, paternal grandmother, sister, sister, and sister; Skin cancer in her maternal grandfather; Stroke in her father and mother  She  reports that she has been smoking  She has a 80 00 pack-year smoking history  She has never used smokeless tobacco  She reports that she does not use drugs  No history on file for alcohol  Current Outpatient Medications   Medication Sig Dispense Refill    ALPRAZolam (XANAX) 0 5 mg tablet Take 1 tablet (0 5 mg total) by mouth 2 (two) times a day as needed for anxiety 60 tablet 0    aspirin (ECOTRIN) 325 mg EC tablet Take by mouth      atorvastatin (LIPITOR) 10 mg tablet Take 1 tablet (10 mg total) by mouth daily 30 tablet 5    b complex vitamins tablet Take 1 tablet by mouth daily      citalopram (CeleXA) 20 mg tablet Take 1 tablet (20 mg total) by mouth daily 90 tablet 3    ergocalciferol (VITAMIN D2) 50,000 units Take 1 capsule (50,000 Units total) by mouth once a week 12 capsule 3    metoprolol tartrate (LOPRESSOR) 50 mg tablet Take 1 tablet (50 mg total) by mouth every 12 (twelve) hours 180 tablet 3     No current facility-administered medications for this visit  She is allergic to lorazepam     Review of Systems          Review systems completed, all negative except as noted above HPI    Objective:      /83   Pulse 89   Temp 98 5 °F (36 9 °C)   Ht 5' 4" (1 626 m)   Wt 82 6 kg (182 lb) BMI 31 24 kg/m²          Physical Exam  Vitals signs reviewed  Constitutional:       General: She is not in acute distress  Appearance: Normal appearance  She is not ill-appearing or diaphoretic  HENT:      Head: Normocephalic and atraumatic  Right Ear: External ear normal       Left Ear: External ear normal    Eyes:      General: No scleral icterus  Right eye: No discharge  Left eye: No discharge  Neck:      Musculoskeletal: Normal range of motion and neck supple  Cardiovascular:      Rate and Rhythm: Normal rate  Pulmonary:      Effort: Pulmonary effort is normal  No respiratory distress  Abdominal:      General: There is no distension  Tenderness: There is no abdominal tenderness  Musculoskeletal: Normal range of motion  General: No tenderness, deformity or signs of injury  Lymphadenopathy:      Cervical: No cervical adenopathy  Skin:     General: Skin is warm and dry  Coloration: Skin is not jaundiced or pale  Comments: The superior  Portion of the right axilla there is a small approximately 1 cm subcutaneous mass likely consistent with cystic lesion  Mobile  No overlying erythema or  Drainage to suggest infection  Mildly discomfort with upon palpation  Neurological:      General: No focal deficit present  Mental Status: She is alert and oriented to person, place, and time  Cranial Nerves: No cranial nerve deficit  Psychiatric:         Mood and Affect: Mood normal          Behavior: Behavior normal          Thought Content:  Thought content normal          Judgment: Judgment normal

## 2021-01-29 NOTE — ASSESSMENT & PLAN NOTE
Symptomatic cyst of the right axilla  Patient like to have this removed  I think this is reasonable  The cyst is approximately 1 cm in size and these can be removed in the office  We will set the patient up for an office visit next week to have this removed  Patient is agreeable

## 2021-02-05 ENCOUNTER — PROCEDURE VISIT (OUTPATIENT)
Dept: SURGERY | Facility: CLINIC | Age: 56
End: 2021-02-05
Payer: COMMERCIAL

## 2021-02-05 ENCOUNTER — APPOINTMENT (OUTPATIENT)
Dept: LAB | Facility: HOSPITAL | Age: 56
End: 2021-02-05
Attending: SURGERY
Payer: COMMERCIAL

## 2021-02-05 VITALS
HEART RATE: 82 BPM | SYSTOLIC BLOOD PRESSURE: 115 MMHG | WEIGHT: 182 LBS | HEIGHT: 64 IN | DIASTOLIC BLOOD PRESSURE: 81 MMHG | BODY MASS INDEX: 31.07 KG/M2 | TEMPERATURE: 98.5 F

## 2021-02-05 DIAGNOSIS — L72.9 CYST OF SKIN: Primary | ICD-10-CM

## 2021-02-05 DIAGNOSIS — L72.9 CYST OF SKIN: ICD-10-CM

## 2021-02-05 PROCEDURE — 88304 TISSUE EXAM BY PATHOLOGIST: CPT | Performed by: PATHOLOGY

## 2021-02-07 PROCEDURE — 11401 EXC TR-EXT B9+MARG 0.6-1 CM: CPT | Performed by: SURGERY

## 2021-02-07 NOTE — PROGRESS NOTES
Assessment/Plan:    Cyst of skin   Patient presented for excision of cyst of right axilla  This occurred today in the office without complication  The cyst was excised under local and the patient tolerated procedure well  Patient will follow up within 2 weeks for suture removal    Follow-up pathology  Diagnoses and all orders for this visit:    Cyst of skin          Subjective:      Patient ID: Lizzy Ogden is a 54 y o  female  71-year-old female with known cystic lesion of the right axilla presents today for excision  No change in size  Or shape of the lesion  No overlying skin changes  No significant change in discomfort  No fevers or chills  The following portions of the patient's history were reviewed and updated as appropriate:   She  has no past medical history on file  She   Patient Active Problem List    Diagnosis Date Noted    Cyst of skin 01/29/2021    Sebaceous cyst 01/21/2021    Age-related cataract of both eyes 10/21/2020    Abnormal mammogram 08/21/2020    Visit for preventive health examination 02/23/2019    Hyperlipidemia 10/06/2016    Vitamin D deficiency 10/06/2016    Lung nodule 10/03/2016    Chronic obstructive airway disease ([facility] [address] ) 09/19/2016    Esophageal reflux 09/19/2016    Allergic rhinitis 03/14/2014    Benign polycythemia 07/16/2013    Anxiety 08/08/2012    Hypertension 08/08/2012     She  has a past surgical history that includes Hysterectomy; Tubal ligation; Breast excisional biopsy (Right); Breast excisional biopsy (Left); and Cataract extraction, bilateral   Her family history includes Bone cancer in her sister; Cancer in her maternal grandmother; Coronary artery disease in her father; Diabetes in her daughter and sister; Glaucoma in her father; Heart disease in her sister; Hypertension in her father and mother; Lung cancer in her sister; Lung disease in her sister;  No Known Problems in her maternal aunt, maternal aunt, paternal aunt, paternal aunt, paternal aunt, paternal aunt, paternal aunt, paternal aunt, paternal aunt, paternal aunt, paternal aunt, paternal aunt, paternal grandfather, paternal grandmother, sister, sister, and sister; Skin cancer in her maternal grandfather; Stroke in her father and mother  She  reports that she has been smoking  She has a 80 00 pack-year smoking history  She has never used smokeless tobacco  She reports that she does not use drugs  No history on file for alcohol  Current Outpatient Medications   Medication Sig Dispense Refill    ALPRAZolam (XANAX) 0 5 mg tablet Take 1 tablet (0 5 mg total) by mouth 2 (two) times a day as needed for anxiety 60 tablet 0    aspirin (ECOTRIN) 325 mg EC tablet Take by mouth      atorvastatin (LIPITOR) 10 mg tablet Take 1 tablet (10 mg total) by mouth daily 30 tablet 5    b complex vitamins tablet Take 1 tablet by mouth daily      citalopram (CeleXA) 20 mg tablet Take 1 tablet (20 mg total) by mouth daily 90 tablet 3    ergocalciferol (VITAMIN D2) 50,000 units Take 1 capsule (50,000 Units total) by mouth once a week 12 capsule 3    metoprolol tartrate (LOPRESSOR) 50 mg tablet Take 1 tablet (50 mg total) by mouth every 12 (twelve) hours 180 tablet 3     No current facility-administered medications for this visit  She is allergic to lorazepam     Review of Systems   Constitutional: Negative  Skin: Negative for color change, pallor, rash and wound  Cyst of right axilla         Objective:      /81   Pulse 82   Temp 98 5 °F (36 9 °C)   Ht 5' 4" (1 626 m)   Wt 82 6 kg (182 lb)   BMI 31 24 kg/m²            Physical Exam  Vitals signs reviewed  Constitutional:       General: She is not in acute distress  Appearance: Normal appearance  She is not ill-appearing, toxic-appearing or diaphoretic  Skin:     General: Skin is warm  Coloration: Skin is not jaundiced or pale  Findings: Lesion (  Palpable likely cystic lesion of right axilla    No overlying erythema  No drainage ) present  No bruising or erythema  Neurological:      Mental Status: She is alert  Skin excision    Date/Time: 2/7/2021 11:16 AM  Performed by: Yaima Ross DO  Authorized by: Yaima Ross DO   Universal Protocol:  Consent: Verbal consent obtained  Written consent obtained  Risks and benefits: risks, benefits and alternatives were discussed  Consent given by: patient  Time out: Immediately prior to procedure a "time out" was called to verify the correct patient, procedure, equipment, support staff and site/side marked as required    Patient understanding: patient states understanding of the procedure being performed  Patient consent: the patient's understanding of the procedure matches consent given  Patient identity confirmed: verbally with patient      Procedure Details - Skin Excision:     Number of Lesions:  1  Lesion 1:     Body area:  Trunk    Trunk location:  R axilla    Initial size (mm):  10   Hidradenitis Suppurativa: No    Final defect size (mm):  10    Malignancy: benign lesion      Repair type:  Linear closure    Closure complexity: simple      Surgical defect:  15mm    Repair size (cm):  1 5

## 2021-02-07 NOTE — ASSESSMENT & PLAN NOTE
Patient presented for excision of cyst of right axilla  This occurred today in the office without complication  The cyst was excised under local and the patient tolerated procedure well  Patient will follow up within 2 weeks for suture removal    Follow-up pathology

## 2021-02-18 ENCOUNTER — HOSPITAL ENCOUNTER (OUTPATIENT)
Dept: MAMMOGRAPHY | Facility: HOSPITAL | Age: 56
Discharge: HOME | End: 2021-02-18
Attending: INTERNAL MEDICINE
Payer: COMMERCIAL

## 2021-02-18 ENCOUNTER — TELEPHONE (OUTPATIENT)
Dept: INTERNAL MEDICINE CLINIC | Facility: CLINIC | Age: 56
End: 2021-02-18

## 2021-02-18 ENCOUNTER — HOSPITAL ENCOUNTER (OUTPATIENT)
Dept: ULTRASOUND IMAGING | Facility: HOSPITAL | Age: 56
Discharge: HOME | End: 2021-02-18
Attending: INTERNAL MEDICINE
Payer: COMMERCIAL

## 2021-02-18 VITALS — BODY MASS INDEX: 31.07 KG/M2 | HEIGHT: 64 IN | WEIGHT: 182 LBS

## 2021-02-18 DIAGNOSIS — F41.9 ANXIETY: ICD-10-CM

## 2021-02-18 DIAGNOSIS — L72.9 CYST OF SKIN: ICD-10-CM

## 2021-02-18 DIAGNOSIS — R92.8 ABNORMAL MAMMOGRAM: ICD-10-CM

## 2021-02-18 DIAGNOSIS — L72.3 SEBACEOUS CYST: ICD-10-CM

## 2021-02-18 DIAGNOSIS — R92.8 ABNORMAL MAMMOGRAM: Primary | ICD-10-CM

## 2021-02-18 DIAGNOSIS — L72.9 CYST OF SKIN: Primary | ICD-10-CM

## 2021-02-18 DIAGNOSIS — L98.9 SKIN LESIONS: ICD-10-CM

## 2021-02-18 PROCEDURE — 77065 DX MAMMO INCL CAD UNI: CPT

## 2021-02-18 PROCEDURE — 76642 ULTRASOUND BREAST LIMITED: CPT

## 2021-02-18 PROCEDURE — G0279 TOMOSYNTHESIS, MAMMO: HCPCS

## 2021-02-18 RX ORDER — ALPRAZOLAM 0.5 MG/1
0.5 TABLET ORAL 2 TIMES DAILY PRN
Qty: 60 TABLET | Refills: 0 | Status: CANCELLED | OUTPATIENT
Start: 2021-02-19 | End: 2021-03-21

## 2021-02-18 RX ORDER — ALPRAZOLAM 0.5 MG/1
0.5 TABLET ORAL 2 TIMES DAILY PRN
Qty: 60 TABLET | Refills: 0 | Status: SHIPPED | OUTPATIENT
Start: 2021-02-19 | End: 2021-03-17 | Stop reason: SDUPTHER

## 2021-02-18 NOTE — TELEPHONE ENCOUNTER
Patient aware of test results  Repeat testing in chart  Please call to schedule testing for patient  US in 2 months and diag mammo in 6 months  Patient also referred to [facility] in [place], not sure if they will call her or not since out of network    Thank you

## 2021-02-18 NOTE — TELEPHONE ENCOUNTER
----- Message from Denia Thomas DO sent at 2/18/2021  9:57 AM EST -----  Recheck right ultrasound in 2 months but felt changes seen due to recent upper arm procedure - suspect benign findings  Diagnostic mammo recheck 6 months (August)

## 2021-02-22 ENCOUNTER — OFFICE VISIT (OUTPATIENT)
Dept: SURGERY | Facility: CLINIC | Age: 56
End: 2021-02-22

## 2021-02-22 VITALS
SYSTOLIC BLOOD PRESSURE: 137 MMHG | DIASTOLIC BLOOD PRESSURE: 84 MMHG | BODY MASS INDEX: 31.58 KG/M2 | HEART RATE: 89 BPM | HEIGHT: 64 IN | WEIGHT: 185 LBS

## 2021-02-22 DIAGNOSIS — L72.9 CYST OF SKIN: Primary | ICD-10-CM

## 2021-02-22 DIAGNOSIS — T81.31XA POSTOPERATIVE WOUND DEHISCENCE, INITIAL ENCOUNTER: ICD-10-CM

## 2021-02-22 PROBLEM — M62.08 DIASTASIS RECTI: Status: ACTIVE | Noted: 2021-02-22

## 2021-02-22 PROCEDURE — 99024 POSTOP FOLLOW-UP VISIT: CPT | Performed by: SURGERY

## 2021-02-23 NOTE — PROGRESS NOTES
Assessment/Plan:    Cyst of skin   Status post excision of epidermal inclusion cyst of the right upper extremity/ axilla  Healing process complicated by fluid collection with subsequent drainage  Sutures removed revealing a dehisced wound at the skin level  No surrounding erythema to suggest ongoing infection  Steri-Strips placed  Will follow up within next 1-2 weeks for wound check  Pathology reviewed discussed with the patient  Wound dehiscence, surgical    Wound dehiscence at the skin level of the  Incision of the right upper extremity /flank  No evidence of infection  No active drainage  Diastasis recti   Noted bulge in the upper mid abdomen which on exam is likely consistent with diastasis recti  I do not feel or palpate any obvious epigastric or periumbilical hernia  However patient is having some discomfort in that area and therefore think it is reasonable to start with an ultrasound to rule out any underlying small hernia defect which may be contributing to the patient's symptoms  Diagnoses and all orders for this visit:    Cyst of skin    Postoperative wound dehiscence, initial encounter          Subjective:      Patient ID: Coleen Riggs is a 54 y o  female  55-year-old female status post excision of a cystic lesion of the right upper extremity/flank presents today for postop check  Patient states she initially called in at that time and said that she was having some more pain and had a fluid collection beneath her arm  She subsequent back in same that the area started to drain she feels better  On today's visit she states that the fluid collection was pretty substantial and that she was seen by a health professional who is a friend of her sisters who subsequently felt this was an infected area started her on antibiotics for 5 days  Since that time she states that the drainage is more less resolved and that things have markedly improved  No fevers or chills    No nausea vomiting  Patient also complains of a bulge in the mid supraumbilical region  There is some occasional discomfort  The following portions of the patient's history were reviewed and updated as appropriate:   She  has a past medical history of Cervical cancer ([facility] [address] )  She   Patient Active Problem List    Diagnosis Date Noted    Wound dehiscence, surgical 02/22/2021    Diastasis recti 02/22/2021    Cyst of skin 01/29/2021    Sebaceous cyst 01/21/2021    Age-related cataract of both eyes 10/21/2020    Abnormal mammogram 08/21/2020    Visit for preventive health examination 02/23/2019    Hyperlipidemia 10/06/2016    Vitamin D deficiency 10/06/2016    Lung nodule 10/03/2016    Chronic obstructive airway disease ([facility] [address] ) 09/19/2016    Esophageal reflux 09/19/2016    Allergic rhinitis 03/14/2014    Benign polycythemia 07/16/2013    Anxiety 08/08/2012    Hypertension 08/08/2012     She  has a past surgical history that includes Hysterectomy; Tubal ligation; Breast excisional biopsy (Right); Breast excisional biopsy (Left); and Cataract extraction, bilateral   Her family history includes Bone cancer in her sister; Cancer in her maternal grandmother; Coronary artery disease in her father; Diabetes in her daughter and sister; Glaucoma in her father; Heart disease in her sister; Hypertension in her father and mother; Lung cancer in her sister; Lung disease in her sister; No Known Problems in her maternal aunt, maternal aunt, paternal aunt, paternal aunt, paternal aunt, paternal aunt, paternal aunt, paternal aunt, paternal aunt, paternal aunt, paternal aunt, paternal aunt, paternal grandfather, paternal grandmother, sister, sister, and sister; Skin cancer in her maternal grandfather; Stroke in her father and mother  She  reports that she has been smoking  She has a 80 00 pack-year smoking history  She has never used smokeless tobacco  She reports that she does not use drugs   No history on file for alcohol  Current Outpatient Medications   Medication Sig Dispense Refill    ALPRAZolam (XANAX) 0 5 mg tablet Take 1 tablet (0 5 mg total) by mouth 2 (two) times a day as needed for anxiety 60 tablet 0    aspirin (ECOTRIN) 325 mg EC tablet Take by mouth      atorvastatin (LIPITOR) 10 mg tablet Take 1 tablet (10 mg total) by mouth daily 30 tablet 5    b complex vitamins tablet Take 1 tablet by mouth daily      citalopram (CeleXA) 20 mg tablet Take 1 tablet (20 mg total) by mouth daily 90 tablet 3    ergocalciferol (VITAMIN D2) 50,000 units Take 1 capsule (50,000 Units total) by mouth once a week 12 capsule 3    metoprolol tartrate (LOPRESSOR) 50 mg tablet Take 1 tablet (50 mg total) by mouth every 12 (twelve) hours 180 tablet 3     No current facility-administered medications for this visit  She is allergic to lorazepam     Review of Systems   Constitutional: Negative for activity change, appetite change, chills, diaphoresis, fatigue, fever and unexpected weight change  Gastrointestinal: Positive for abdominal pain  Skin: Positive for wound (  RightUpper extremity/axilla)  Negative for color change, pallor and rash  Objective:      /84   Pulse 89   Ht 5' 4" (1 626 m)   Wt 83 9 kg (185 lb)   BMI 31 76 kg/m²          Physical Exam  Vitals signs reviewed  Constitutional:       General: She is not in acute distress  Appearance: Normal appearance  She is not ill-appearing, toxic-appearing or diaphoretic  Abdominal:      General: There is no distension  Palpations: Abdomen is soft  There is no mass  Tenderness: There is no abdominal tenderness  Hernia: No hernia is present  Comments: No obvious hernia palpable  The small bulge or discomfort area tends to correlate with a diastasis recti on exam    Skin:     General: Skin is warm and dry  Coloration: Skin is not jaundiced or pale        Comments: Right upper extremity /axilla incision appears clean dry intact with sutures in place  With sutures removed there is a dehiscence at the skin level  Small amount of slough noted in the wound  No active drainage  No erythema to suggest infection  Neurological:      Mental Status: She is alert  procedure:    Sutures from the right  Upper extremity/ axilla removed without complication  Wound is noted  No active drainage  Patient tolerated procedure well

## 2021-02-23 NOTE — ASSESSMENT & PLAN NOTE
Status post excision of epidermal inclusion cyst of the right upper extremity/ axilla  Healing process complicated by fluid collection with subsequent drainage  Sutures removed revealing a dehisced wound at the skin level  No surrounding erythema to suggest ongoing infection  Steri-Strips placed  Will follow up within next 1-2 weeks for wound check  Pathology reviewed discussed with the patient

## 2021-02-23 NOTE — ASSESSMENT & PLAN NOTE
Noted bulge in the upper mid abdomen which on exam is likely consistent with diastasis recti  I do not feel or palpate any obvious epigastric or periumbilical hernia  However patient is having some discomfort in that area and therefore think it is reasonable to start with an ultrasound to rule out any underlying small hernia defect which may be contributing to the patient's symptoms

## 2021-02-26 ENCOUNTER — HOSPITAL ENCOUNTER (OUTPATIENT)
Dept: ULTRASOUND IMAGING | Facility: HOSPITAL | Age: 56
Discharge: HOME | End: 2021-02-26
Attending: SURGERY
Payer: COMMERCIAL

## 2021-02-26 DIAGNOSIS — T81.31XA POSTOPERATIVE WOUND DEHISCENCE, INITIAL ENCOUNTER: ICD-10-CM

## 2021-02-26 PROCEDURE — 76705 ECHO EXAM OF ABDOMEN: CPT

## 2021-03-02 ENCOUNTER — OFFICE VISIT (OUTPATIENT)
Dept: SURGERY | Facility: CLINIC | Age: 56
End: 2021-03-02
Payer: COMMERCIAL

## 2021-03-02 VITALS
HEART RATE: 88 BPM | WEIGHT: 182 LBS | SYSTOLIC BLOOD PRESSURE: 151 MMHG | TEMPERATURE: 98.5 F | DIASTOLIC BLOOD PRESSURE: 83 MMHG | BODY MASS INDEX: 31.07 KG/M2 | HEIGHT: 64 IN

## 2021-03-02 DIAGNOSIS — T81.31XA POSTOPERATIVE WOUND DEHISCENCE, INITIAL ENCOUNTER: Primary | ICD-10-CM

## 2021-03-02 PROCEDURE — 3079F DIAST BP 80-89 MM HG: CPT | Performed by: SURGERY

## 2021-03-02 PROCEDURE — 4004F PT TOBACCO SCREEN RCVD TLK: CPT | Performed by: SURGERY

## 2021-03-02 PROCEDURE — 3008F BODY MASS INDEX DOCD: CPT | Performed by: SURGERY

## 2021-03-02 PROCEDURE — 3077F SYST BP >= 140 MM HG: CPT | Performed by: SURGERY

## 2021-03-02 PROCEDURE — 99212 OFFICE O/P EST SF 10 MIN: CPT | Performed by: SURGERY

## 2021-03-07 NOTE — ASSESSMENT & PLAN NOTE
Healing wound of  Right upper extremity   Status post excision of cystic lesion  Wound itself is on the few mm wide  Unfortunately she suffered a wound dehiscence at the skin level after excision of cyst   No erythema  No drainage  I suspect we will completely heal within the next week  Continue to keep wound covered and washed daily with soap water  She may follow up em [place]

## 2021-03-07 NOTE — PROGRESS NOTES
Assessment/Plan:    Wound dehiscence, surgical   Healing wound of  Right upper extremity   Status post excision of cystic lesion  Wound itself is on the few mm wide  Unfortunately she suffered a wound dehiscence at the skin level after excision of cyst   No erythema  No drainage  I suspect we will completely heal within the next week  Continue to keep wound covered and washed daily with soap water  She may follow up em [facility] Diagnoses and all orders for this visit:    Postoperative wound dehiscence, initial encounter          Subjective:      Patient ID: Isabella Borrego is a 54 y o  female  59-year-old female status post excision of  Right upper extremity complicated by postoperative wound dehiscence at the skin level presents today for follow-up  Overall doing well  No nausea vomiting  No fevers or chills  No significant drainage from the wound  She states she feels is doing much better  No significant redness  The following portions of the patient's history were reviewed and updated as appropriate:   She  has a past medical history of Cervical cancer ([facility] [address] )  She   Patient Active Problem List    Diagnosis Date Noted    Wound dehiscence, surgical 02/22/2021    Diastasis recti 02/22/2021    Cyst of skin 01/29/2021    Sebaceous cyst 01/21/2021    Age-related cataract of both eyes 10/21/2020    Abnormal mammogram 08/21/2020    Visit for preventive health examination 02/23/2019    Hyperlipidemia 10/06/2016    Vitamin D deficiency 10/06/2016    Lung nodule 10/03/2016    Chronic obstructive airway disease ([facility] [address] ) 09/19/2016    Esophageal reflux 09/19/2016    Allergic rhinitis 03/14/2014    Benign polycythemia 07/16/2013    Anxiety 08/08/2012    Hypertension 08/08/2012     She  has a past surgical history that includes Hysterectomy;  Tubal ligation; Breast excisional biopsy (Right); Breast excisional biopsy (Left); and Cataract extraction, bilateral   Her family history includes Bone cancer in her sister; Cancer in her maternal grandmother; Coronary artery disease in her father; Diabetes in her daughter and sister; Glaucoma in her father; Heart disease in her sister; Hypertension in her father and mother; Lung cancer in her sister; Lung disease in her sister; No Known Problems in her maternal aunt, maternal aunt, paternal aunt, paternal aunt, paternal aunt, paternal aunt, paternal aunt, paternal aunt, paternal aunt, paternal aunt, paternal aunt, paternal aunt, paternal grandfather, paternal grandmother, sister, sister, and sister; Skin cancer in her maternal grandfather; Stroke in her father and mother  She  reports that she has been smoking  She has a 80 00 pack-year smoking history  She has never used smokeless tobacco  She reports that she does not use drugs  No history on file for alcohol  Current Outpatient Medications   Medication Sig Dispense Refill    ALPRAZolam (XANAX) 0 5 mg tablet Take 1 tablet (0 5 mg total) by mouth 2 (two) times a day as needed for anxiety 60 tablet 0    aspirin (ECOTRIN) 325 mg EC tablet Take by mouth      atorvastatin (LIPITOR) 10 mg tablet Take 1 tablet (10 mg total) by mouth daily 30 tablet 5    b complex vitamins tablet Take 1 tablet by mouth daily      citalopram (CeleXA) 20 mg tablet Take 1 tablet (20 mg total) by mouth daily 90 tablet 3    ergocalciferol (VITAMIN D2) 50,000 units Take 1 capsule (50,000 Units total) by mouth once a week 12 capsule 3    metoprolol tartrate (LOPRESSOR) 50 mg tablet Take 1 tablet (50 mg total) by mouth every 12 (twelve) hours 180 tablet 3     No current facility-administered medications for this visit  She is allergic to lorazepam     Review of Systems   Constitutional: Negative for activity change, appetite change, chills, diaphoresis, fatigue, fever and unexpected weight change  Skin: Positive for wound (  Right upper extremity)  Negative for color change, pallor and rash           Objective:      /83 Pulse 88   Temp 98 5 °F (36 9 °C)   Ht 5' 4" (1 626 m)   Wt 82 6 kg (182 lb)   BMI 31 24 kg/m²          Physical Exam  Vitals signs reviewed  Constitutional:       General: She is not in acute distress  Appearance: Normal appearance  She is not ill-appearing, toxic-appearing or diaphoretic  HENT:      Head: Normocephalic and atraumatic  Right Ear: External ear normal       Left Ear: External ear normal    Eyes:      General: No scleral icterus  Right eye: No discharge  Left eye: No discharge  Pulmonary:      Effort: Pulmonary effort is normal  No respiratory distress  Musculoskeletal: Normal range of motion  General: No swelling  Skin:     General: Skin is warm and dry  Coloration: Skin is not jaundiced or pale  Findings: No bruising or erythema  Comments: Right upper extremity wound measuring approximately 2 mm in diameter  No significant drainage  No erythema  Nontender  Neurological:      General: No focal deficit present  Mental Status: She is alert and oriented to person, place, and time  Cranial Nerves: No cranial nerve deficit  Psychiatric:         Mood and Affect: Mood normal          Behavior: Behavior normal          Thought Content:  Thought content normal          Judgment: Judgment normal

## 2021-03-17 DIAGNOSIS — F41.9 ANXIETY: ICD-10-CM

## 2021-03-17 RX ORDER — ALPRAZOLAM 0.5 MG/1
0.5 TABLET ORAL 2 TIMES DAILY PRN
Qty: 60 TABLET | Refills: 0 | Status: SHIPPED | OUTPATIENT
Start: 2021-03-17 | End: 2021-04-14 | Stop reason: SDUPTHER

## 2021-04-01 ENCOUNTER — IMMUNIZATIONS (OUTPATIENT)
Dept: FAMILY MEDICINE CLINIC | Facility: HOSPITAL | Age: 56
End: 2021-04-01

## 2021-04-01 DIAGNOSIS — Z23 ENCOUNTER FOR IMMUNIZATION: Primary | ICD-10-CM

## 2021-04-01 PROCEDURE — 91301 SARS-COV-2 / COVID-19 MRNA VACCINE (MODERNA) 100 MCG: CPT

## 2021-04-01 PROCEDURE — 0011A SARS-COV-2 / COVID-19 MRNA VACCINE (MODERNA) 100 MCG: CPT

## 2021-04-02 ENCOUNTER — TELEPHONE (OUTPATIENT)
Dept: INTERNAL MEDICINE CLINIC | Facility: CLINIC | Age: 56
End: 2021-04-02

## 2021-04-02 NOTE — TELEPHONE ENCOUNTER
Pt called she got covid vaccine yesterday and is now having chills, stuffy and congestion  Pt asked what you recommend

## 2021-04-02 NOTE — TELEPHONE ENCOUNTER
Increase fluids, rest and Tylenol (2) every 6 hours This is possible reaction over next 24- 48 from the vaccine

## 2021-04-14 DIAGNOSIS — F41.9 ANXIETY: ICD-10-CM

## 2021-04-14 RX ORDER — ALPRAZOLAM 0.5 MG/1
0.5 TABLET ORAL 2 TIMES DAILY PRN
Qty: 60 TABLET | Refills: 0 | Status: SHIPPED | OUTPATIENT
Start: 2021-04-14 | End: 2021-05-12 | Stop reason: SDUPTHER

## 2021-04-21 ENCOUNTER — OFFICE VISIT (OUTPATIENT)
Dept: INTERNAL MEDICINE CLINIC | Facility: CLINIC | Age: 56
End: 2021-04-21
Payer: COMMERCIAL

## 2021-04-21 VITALS
WEIGHT: 181.38 LBS | DIASTOLIC BLOOD PRESSURE: 78 MMHG | HEIGHT: 64 IN | SYSTOLIC BLOOD PRESSURE: 124 MMHG | TEMPERATURE: 97.5 F | BODY MASS INDEX: 30.96 KG/M2

## 2021-04-21 DIAGNOSIS — I10 ESSENTIAL HYPERTENSION: ICD-10-CM

## 2021-04-21 DIAGNOSIS — R07.9 CHEST PAIN, UNSPECIFIED TYPE: ICD-10-CM

## 2021-04-21 DIAGNOSIS — Z00.00 ANNUAL PHYSICAL EXAM: Primary | ICD-10-CM

## 2021-04-21 DIAGNOSIS — Z12.11 COLON CANCER SCREENING: ICD-10-CM

## 2021-04-21 DIAGNOSIS — J43.9 PULMONARY EMPHYSEMA, UNSPECIFIED EMPHYSEMA TYPE (HCC): ICD-10-CM

## 2021-04-21 DIAGNOSIS — R63.5 WEIGHT GAIN: ICD-10-CM

## 2021-04-21 PROBLEM — L72.9 CYST OF SKIN: Status: RESOLVED | Noted: 2021-01-29 | Resolved: 2021-04-21

## 2021-04-21 PROCEDURE — 99396 PREV VISIT EST AGE 40-64: CPT | Performed by: INTERNAL MEDICINE

## 2021-04-21 NOTE — PROGRESS NOTES
ADULT ANNUAL Romeo Castro  INTERNAL MEDICINE AT [facility]    NAME: Sherly Narvaez  AGE: 54 y o  SEX: female  : 1965     DATE: 2021     Assessment and Plan:     Problem List Items Addressed This Visit        Respiratory    Chronic obstructive airway disease ([address] )    Relevant Orders    CT chest w contrast    Comprehensive metabolic panel    LDL cholesterol, direct       Cardiovascular and Mediastinum    Hypertension    Relevant Orders    Comprehensive metabolic panel    CBC and differential    LDL cholesterol, direct       Other    Annual physical exam - Primary    Relevant Orders    Comprehensive metabolic panel    CBC and differential    LDL cholesterol, direct      Other Visit Diagnoses     Colon cancer screening        Weight gain        Relevant Orders    Ambulatory referral to Weight Management    TSH, 3rd generation    Chest pain, unspecified type        Relevant Orders    CT chest w contrast    Comprehensive metabolic panel    CBC and differential    LDL cholesterol, direct      Schedule Ct chest  Has Breast US   Referral weight management   Rx for labs   Smoking cessation encouraged  Had first covid vaccine   Rto 6 months     Immunizations and preventive care screenings were discussed with patient today  Appropriate education was printed on patient's after visit summary  Counseling:  · Exercise: the importance of regular exercise/physical activity was discussed  Recommend exercise 3-5 times per week for at least 30 minutes  No follow-ups on file  Chief Complaint:     Chief Complaint   Patient presents with    Annual Exam      History of Present Illness:     Adult Annual Physical   Patient here for a comprehensive physical exam  The patient reports problems - chest pain left side She did not have CT chest as of yet Has US breast tomorrow for followup  Diet and Physical Activity  · Diet/Nutrition: low fat diet     · Exercise: 3-4 times a week on average  Depression Screening  PHQ-9 Depression Screening    PHQ-9:   Frequency of the following problems over the past two weeks:           General Health  · Sleep: gets 4-6 hours of sleep on average  · Hearing: normal - bilateral   · Vision: no vision problems  · Dental: regular dental visits  /GYN Health  · Patient is: postmenopausal  · Last menstrual period: years  · Contraceptive method: barrier methods  Review of Systems:     Review of Systems   Constitutional: Negative for chills and fever  HENT: Negative  Eyes: Negative for visual disturbance  Respiratory: Negative for cough, chest tightness and shortness of breath  Cardiovascular: Negative for chest pain, palpitations and leg swelling  Gastrointestinal: Negative for abdominal distention and abdominal pain  Genitourinary: Negative for difficulty urinating, dysuria and frequency  Musculoskeletal: Positive for arthralgias  Neurological: Negative for dizziness, light-headedness and headaches  Psychiatric/Behavioral: Negative for sleep disturbance  The patient is not nervous/anxious         Past Medical History:     Past Medical History:   Diagnosis Date    Cervical cancer [facility])       Past Surgical History:     Past Surgical History:   Procedure Laterality Date    BREAST EXCISIONAL BIOPSY Right     benign    BREAST EXCISIONAL BIOPSY Left     benign    CATARACT EXTRACTION, BILATERAL      HYSTERECTOMY      TUBAL LIGATION        Social History:     E-Cigarette/Vaping    E-Cigarette Use Never User      E-Cigarette/Vaping Substances    Nicotine No     THC No     CBD No     Flavoring No     Other No     Unknown No      Social History     Socioeconomic History    Marital status: /Civil Union     Spouse name: None    Number of children: None    Years of education: None    Highest education level: None   Occupational History    None   Social Needs    Financial resource strain: None   [facility] insecurity     Worry: None     Inability: None    Transportation needs     Medical: None     Non-medical: None   Tobacco Use    Smoking status: Current Every Day Smoker     Packs/day: 2 00     Years: 40 00     Pack years: 80 00    Smokeless tobacco: Never Used   Substance and Sexual Activity    Alcohol use: None    Drug use: No    Sexual activity: None   Lifestyle    Physical activity     Days per week: None     Minutes per session: None    Stress: None   Relationships    Social connections     Talks on phone: None     Gets together: None     Attends Zoroastrian service: None     Active member of club or organization: None     Attends meetings of clubs or organizations: None     Relationship status: None    Intimate partner violence     Fear of current or ex partner: None     Emotionally abused: None     Physically abused: None     Forced sexual activity: None   Other Topics Concern    None   Social History Narrative    Dental care, occasionally    Lives independently with spouse    No living will    Sun protection - sunscreen    Uses safety equipment - seatbelts      Family History:     Family History   Problem Relation Age of Onset    Hypertension Mother     Stroke Mother     Coronary artery disease Father     Glaucoma Father     Hypertension Father     Stroke Father     Diabetes Sister     Heart disease Sister     Lung disease Sister     Lung cancer Sister     Diabetes Daughter     Cancer Maternal Grandmother         unknown origin     Skin cancer Maternal Grandfather     No Known Problems Paternal Grandmother     No Known Problems Paternal Grandfather     Bone cancer Sister     No Known Problems Sister     No Known Problems Sister     No Known Problems Sister     No Known Problems Maternal Aunt     No Known Problems Maternal Aunt     No Known Problems Paternal Aunt     No Known Problems Paternal Aunt     No Known Problems Paternal Aunt     No Known Problems Paternal Aunt     No Known Problems Paternal Aunt     No Known Problems Paternal Aunt     No Known Problems Paternal Aunt     No Known Problems Paternal Aunt     No Known Problems Paternal Aunt     No Known Problems Paternal Aunt       Current Medications:     Current Outpatient Medications   Medication Sig Dispense Refill    ALPRAZolam (XANAX) 0 5 mg tablet Take 1 tablet (0 5 mg total) by mouth 2 (two) times a day as needed for anxiety 60 tablet 0    aspirin (ECOTRIN) 325 mg EC tablet Take by mouth      atorvastatin (LIPITOR) 10 mg tablet Take 1 tablet (10 mg total) by mouth daily 30 tablet 5    b complex vitamins tablet Take 1 tablet by mouth daily      citalopram (CeleXA) 20 mg tablet Take 1 tablet (20 mg total) by mouth daily 90 tablet 3    ergocalciferol (VITAMIN D2) 50,000 units Take 1 capsule (50,000 Units total) by mouth once a week 12 capsule 3    metoprolol tartrate (LOPRESSOR) 50 mg tablet Take 1 tablet (50 mg total) by mouth every 12 (twelve) hours 180 tablet 3     No current facility-administered medications for this visit  Allergies: Allergies   Allergen Reactions    Lorazepam       Physical Exam:     /78   Temp 97 5 °F (36 4 °C) (Temporal)   Ht 5' 4" (1 626 m)   Wt 82 3 kg (181 lb 6 oz)   BMI 31 13 kg/m²     Physical Exam  Vitals signs reviewed  Constitutional:       General: She is not in acute distress  Appearance: Normal appearance  She is not ill-appearing, toxic-appearing or diaphoretic  HENT:      Head: Normocephalic and atraumatic  Right Ear: Tympanic membrane, ear canal and external ear normal  There is no impacted cerumen  Left Ear: Tympanic membrane, ear canal and external ear normal  There is no impacted cerumen  Nose: Nose normal       Mouth/Throat:      Mouth: Mucous membranes are dry  Eyes:      General: No scleral icterus  Extraocular Movements: Extraocular movements intact        Conjunctiva/sclera: Conjunctivae normal       Pupils: Pupils are equal, round, and reactive to light  Neck:      Musculoskeletal: Normal range of motion and neck supple  No neck rigidity  Cardiovascular:      Rate and Rhythm: Normal rate and regular rhythm  Pulses: Normal pulses  Heart sounds: Normal heart sounds  Pulmonary:      Effort: Pulmonary effort is normal  No respiratory distress  Breath sounds: Normal breath sounds  No wheezing  Abdominal:      General: Bowel sounds are normal  There is no distension  Palpations: Abdomen is soft  Tenderness: There is no abdominal tenderness  Musculoskeletal: Normal range of motion  Right lower leg: No edema  Left lower leg: No edema  Lymphadenopathy:      Cervical: No cervical adenopathy  Skin:     General: Skin is dry  Coloration: Skin is not jaundiced or pale  Findings: Lesion present  No erythema  Comments: Hives    Neurological:      General: No focal deficit present  Mental Status: She is alert and oriented to person, place, and time  Mental status is at baseline  Cranial Nerves: No cranial nerve deficit  Motor: No weakness  Psychiatric:         Mood and Affect: Mood normal          Behavior: Behavior normal          Thought Content:  Thought content normal          Judgment: Judgment normal           Debbie Rome DO  [facility] INTERNAL MEDICINE AT [facility]

## 2021-04-21 NOTE — PATIENT INSTRUCTIONS

## 2021-04-22 ENCOUNTER — HOSPITAL ENCOUNTER (OUTPATIENT)
Dept: ULTRASOUND IMAGING | Facility: HOSPITAL | Age: 56
Discharge: HOME | End: 2021-04-22
Attending: INTERNAL MEDICINE
Payer: COMMERCIAL

## 2021-04-22 DIAGNOSIS — R92.8 ABNORMAL MAMMOGRAM: ICD-10-CM

## 2021-04-22 PROCEDURE — 76642 ULTRASOUND BREAST LIMITED: CPT

## 2021-04-26 ENCOUNTER — CONSULT (OUTPATIENT)
Dept: BARIATRICS | Facility: CLINIC | Age: 56
End: 2021-04-26
Payer: COMMERCIAL

## 2021-04-26 VITALS
TEMPERATURE: 97.6 F | WEIGHT: 178.4 LBS | HEART RATE: 70 BPM | RESPIRATION RATE: 17 BRPM | BODY MASS INDEX: 31.61 KG/M2 | DIASTOLIC BLOOD PRESSURE: 98 MMHG | HEIGHT: 63 IN | SYSTOLIC BLOOD PRESSURE: 156 MMHG

## 2021-04-26 DIAGNOSIS — Z91.89 AT RISK FOR SLEEP APNEA: ICD-10-CM

## 2021-04-26 DIAGNOSIS — R63.5 WEIGHT GAIN: Primary | ICD-10-CM

## 2021-04-26 DIAGNOSIS — E55.9 VITAMIN D DEFICIENCY: ICD-10-CM

## 2021-04-26 DIAGNOSIS — R06.83 SNORING: ICD-10-CM

## 2021-04-26 DIAGNOSIS — E78.2 MIXED HYPERLIPIDEMIA: ICD-10-CM

## 2021-04-26 DIAGNOSIS — I10 BENIGN ESSENTIAL HTN: ICD-10-CM

## 2021-04-26 PROBLEM — E66.9 CLASS 1 OBESITY: Status: ACTIVE | Noted: 2021-04-26

## 2021-04-26 PROBLEM — E66.811 CLASS 1 OBESITY: Status: ACTIVE | Noted: 2021-04-26

## 2021-04-26 PROCEDURE — 4004F PT TOBACCO SCREEN RCVD TLK: CPT | Performed by: PHYSICIAN ASSISTANT

## 2021-04-26 PROCEDURE — 3080F DIAST BP >= 90 MM HG: CPT | Performed by: PHYSICIAN ASSISTANT

## 2021-04-26 PROCEDURE — 3077F SYST BP >= 140 MM HG: CPT | Performed by: PHYSICIAN ASSISTANT

## 2021-04-26 PROCEDURE — 99244 OFF/OP CNSLTJ NEW/EST MOD 40: CPT | Performed by: PHYSICIAN ASSISTANT

## 2021-04-26 PROCEDURE — 3008F BODY MASS INDEX DOCD: CPT | Performed by: PHYSICIAN ASSISTANT

## 2021-04-26 NOTE — PATIENT INSTRUCTIONS
Goals: Food log (ie ) www myfitnesspal com,sparkpeople  com,loseit com,calorieking  com,etc  baritastic  No sugary beverages- may want to gradually decrease to prevent headaches  At least 64oz of water daily  Increase physical activity by 10 minutes daily  Gradually increase physical activity to a goal of 5 days per week for 30 minutes of MODERATE intensity PLUS 2 days per week of FULL BODY resistance training  5-10 servings of fruits and vegetables per day and 25-35 grams of dietary fiber per day, gradually increasing  Measure all calories including: cooking oils/butters, sugar, dressing, condiments/peanut butter, etc and log calories   If choosing starch pick whole grain/complex carbs and keep 1/2 cup: brown rice, quinoa, whole wheat pasta, oatmeal, potato, sweet potato, chickpea noodles, red lentil noodles  3645-9239 calories, 4977-7605 calories on walk days  Recommend checking lab coverage before having labs drawn  I ordered thyroid (TSH)  Dr Meka Mo ordered this too   IT may not be covered when Dr Meka Mo ordered it for so if mine is normal do not have Dr Margoth Alvarez done

## 2021-04-26 NOTE — PROGRESS NOTES
Assessment/Plan:    Class 1 obesity  -Discussed options of HealthyCORE-Intensive Lifestyle Intervention Program, Very Low Calorie Diet-VLCD and Conservative Program and the role of weight loss medications   -Initial weight loss goal of 5-10% weight loss for improved health  -Screening labs: insulin and vitamin D with labs as ordered  -Patient is interested in pursuing Conservative Program   -Risks of tobacco use reviewed  She is not interested in quitting    -Calorie goals, sample menu, portion size guidelines, and food logging reviewed with the patient    -Avoid phentermine: hc of alcohol use, tried "meth" once for weight loss and got to sick from it    -Has headaches  Can consider Topamax    Hyperlipidemia  -on statin  -lifestyle changes/weight loss    Follow up in approximately 3 months with Non-Surgical Physician/Advanced Practitioner  Risks of untreated/uncontrolled BP reviewed with the pt, including MI, CVA, damage to the blood vessels, eyes/kidneys  Parameters reviewed  Goals:  Food log (ie ) www myfitnesspal com,sparkpeople  com,loseit com,calorieking  com,etc  baritastic  No sugary beverages- may want to gradually decrease to prevent headaches  At least 64oz of water daily  Increase physical activity by 10 minutes daily   Gradually increase physical activity to a goal of 5 days per week for 30 minutes of MODERATE intensity PLUS 2 days per week of FULL BODY resistance training  5-10 servings of fruits and vegetables per day and 25-35 grams of dietary fiber per day, gradually increasing  Measure all calories including: cooking oils/butters, sugar, dressing, condiments/peanut butter, etc and log calories   If choosing starch pick whole grain/complex carbs and keep 1/2 cup: brown rice, quinoa, whole wheat pasta, oatmeal, potato, sweet potato, chickpea noodles, red lentil noodles  2962-4302 calories, 3766-8981 calories on walk days  Recommend checking lab coverage before having labs drawn    Diagnoses and all orders for this visit:    Weight gain  -     Ambulatory referral to Weight Management  -     Ambulatory referral to Sleep Medicine; Future  -     Insulin, fasting; Future  -     Vitamin D 25 hydroxy; Future    Mixed hyperlipidemia  -     Ambulatory referral to Sleep Medicine; Future  -     Insulin, fasting; Future  -     Vitamin D 25 hydroxy; Future    Vitamin D deficiency  -     Ambulatory referral to Sleep Medicine; Future  -     Insulin, fasting; Future  -     Vitamin D 25 hydroxy; Future    Body mass index 31 0-31 9, adult  -     Ambulatory referral to Sleep Medicine; Future  -     Insulin, fasting; Future  -     Vitamin D 25 hydroxy; Future    At risk for sleep apnea  -     Ambulatory referral to Sleep Medicine; Future  -     Insulin, fasting; Future  -     Vitamin D 25 hydroxy; Future    Snoring  -     Ambulatory referral to Sleep Medicine; Future  -     Insulin, fasting; Future  -     Vitamin D 25 hydroxy; Future    Benign essential HTN  -     Ambulatory referral to Sleep Medicine; Future  -     Insulin, fasting; Future  -     Vitamin D 25 hydroxy; Future    Subjective:   Chief Complaint   Patient presents with    Consult     Patient is here for MW consult  Stop nicolas 4/8  Patient ID: Dharmesh Woods  is a 54 y o  female with excess weight/obesity here to pursue weight management  Past Medical History:   Diagnosis Date    Cervical cancer ([facility] [address] )      HPI:  Obesity/Excess Weight:  Severity: Mild  Onset:  3 years after her brother passed away, alcoholism/depression as a result    Modifiers: no previous attempts  Contributing factors: Stress/Emotional Eating, Depression and alcohol,  brings sweets in the house  Associated symptoms: body image issues and decreased self esteem   States  wants to divorce her due to her size  Lives w/ alcoholic, but reports being safe at home    Colonoscopy-  Not on file, but cologuard was ordered by her PCP, but it was not covered   She refuses colonoscopy    Goals: 130  Hydration:  Drinks water "all the time " Drinks 5 glasses of ice tea per day  Drinks 2 cups of coffee with cream and sugar  Alcohol: sober for 16 months  Exercise: walks dog, 3-4x/week for an hour  Occupation: retired  [place]: 4/8    The following portions of the patient's history were reviewed and updated as appropriate: allergies, current medications, past family history, past medical history, past social history, past surgical history and problem list     Review of Systems   Constitutional: Negative for chills and fever  HENT: Negative for sore throat  Respiratory: Positive for shortness of breath (with steps, she has a ct scan scheduled for eval)  Negative for cough  Cardiovascular: Positive for chest pain (evaluated by PCP)  Negative for palpitations  Gastrointestinal: Negative for constipation and diarrhea  Genitourinary: Negative for dysuria  Musculoskeletal: Negative for arthralgias  Skin: Positive for rash (hives, which she asociates with stress; has had evaluated by PCP)  Neurological: Positive for headaches (associates with stress, recommend eval if sx persist or worsen)  Psychiatric/Behavioral: Negative  Objective:    /98 (BP Location: Left arm, Patient Position: Sitting, Cuff Size: Large)   Pulse 70   Temp 97 6 °F (36 4 °C)   Resp 17   Ht 5' 3" (1 6 m)   Wt 80 9 kg (178 lb 6 4 oz)   BMI 31 60 kg/m²     Physical Exam  Vitals signs and nursing note reviewed  Constitutional   General appearance: Abnormal   well developed and obese  Eyes No conjunctival pallor  Ears, Nose, Mouth, and Throat External ears without erythema, edema, or drainage  Pulmonary   Respiratory effort: No increased work of breathing or signs of respiratory distress  Auscultation of lungs: Clear to auscultation, equal breath sounds bilaterally, no wheezes, no rales, no rhonci      Cardiovascular   Auscultation of heart: Normal rate and rhythm, normal S1 and S2, without murmurs  Examination of extremities for edema and/or varicosities: Normal   no edema  Abdomen   Abdomen: Abnormal   The abdomen was obese  Bowel sounds were normal  The abdomen was soft and nontender     Musculoskeletal   Gait and station: Normal     Psychiatric   Orientation to person, place and time: Normal     Affect: appropriate

## 2021-04-26 NOTE — ASSESSMENT & PLAN NOTE
-Discussed options of HealthyCORE-Intensive Lifestyle Intervention Program, Very Low Calorie Diet-VLCD and Conservative Program and the role of weight loss medications   -Initial weight loss goal of 5-10% weight loss for improved health  -Screening labs: insulin and vitamin D with labs as ordered  -Patient is interested in pursuing Conservative Program   -Risks of tobacco use reviewed  She is not interested in quitting    -Calorie goals, sample menu, portion size guidelines, and food logging reviewed with the patient    -Avoid phentermine: hc of alcohol use, tried "meth" once for weight loss and got to sick from it    -Has headaches   Can consider Topamax

## 2021-04-28 ENCOUNTER — TELEPHONE (OUTPATIENT)
Dept: INTERNAL MEDICINE CLINIC | Facility: CLINIC | Age: 56
End: 2021-04-28

## 2021-04-28 NOTE — TELEPHONE ENCOUNTER
PENDING [address] - phoned for update on status of request for auth, CT CHEST  DIANA received clinicals, looking for a most recent xray, which we do not have  DIANA noted that info and is sending back to CLINCAL REVIEW  At this time, pending       This CT CHEST  Is scheduled for 5/7/21

## 2021-04-30 ENCOUNTER — IMMUNIZATIONS (OUTPATIENT)
Dept: FAMILY MEDICINE CLINIC | Facility: HOSPITAL | Age: 56
End: 2021-04-30

## 2021-04-30 DIAGNOSIS — Z23 ENCOUNTER FOR IMMUNIZATION: Primary | ICD-10-CM

## 2021-04-30 PROCEDURE — 0012A SARS-COV-2 / COVID-19 MRNA VACCINE (MODERNA) 100 MCG: CPT

## 2021-04-30 PROCEDURE — 91301 SARS-COV-2 / COVID-19 MRNA VACCINE (MODERNA) 100 MCG: CPT

## 2021-05-07 ENCOUNTER — HOSPITAL ENCOUNTER (OUTPATIENT)
Dept: CT IMAGING | Facility: HOSPITAL | Age: 56
Discharge: HOME | End: 2021-05-07
Attending: INTERNAL MEDICINE
Payer: COMMERCIAL

## 2021-05-07 ENCOUNTER — APPOINTMENT (OUTPATIENT)
Dept: LAB | Facility: HOSPITAL | Age: 56
End: 2021-05-07
Attending: INTERNAL MEDICINE
Payer: COMMERCIAL

## 2021-05-07 DIAGNOSIS — E78.2 MIXED HYPERLIPIDEMIA: ICD-10-CM

## 2021-05-07 DIAGNOSIS — R07.9 CHEST PAIN, UNSPECIFIED TYPE: ICD-10-CM

## 2021-05-07 DIAGNOSIS — I10 ESSENTIAL HYPERTENSION: ICD-10-CM

## 2021-05-07 DIAGNOSIS — R63.5 WEIGHT GAIN: ICD-10-CM

## 2021-05-07 DIAGNOSIS — J43.9 PULMONARY EMPHYSEMA, UNSPECIFIED EMPHYSEMA TYPE (HCC): ICD-10-CM

## 2021-05-07 DIAGNOSIS — R06.83 SNORING: ICD-10-CM

## 2021-05-07 DIAGNOSIS — Z91.89 AT RISK FOR SLEEP APNEA: ICD-10-CM

## 2021-05-07 DIAGNOSIS — I10 BENIGN ESSENTIAL HTN: ICD-10-CM

## 2021-05-07 DIAGNOSIS — Z00.00 ANNUAL PHYSICAL EXAM: ICD-10-CM

## 2021-05-07 DIAGNOSIS — E55.9 VITAMIN D DEFICIENCY: ICD-10-CM

## 2021-05-07 LAB
25(OH)D3 SERPL-MCNC: 72 NG/ML (ref 30–100)
ALBUMIN SERPL BCP-MCNC: 3.7 G/DL (ref 3.5–5)
ALP SERPL-CCNC: 126 U/L (ref 46–116)
ALT SERPL W P-5'-P-CCNC: 28 U/L (ref 12–78)
ANION GAP SERPL CALCULATED.3IONS-SCNC: 6 MMOL/L (ref 4–13)
AST SERPL W P-5'-P-CCNC: 19 U/L (ref 5–45)
BASOPHILS # BLD AUTO: 0.07 THOUSANDS/ΜL (ref 0–0.1)
BASOPHILS NFR BLD AUTO: 1 % (ref 0–1)
BILIRUB SERPL-MCNC: 0.67 MG/DL (ref 0.2–1)
BUN SERPL-MCNC: 16 MG/DL (ref 5–25)
CALCIUM SERPL-MCNC: 8.8 MG/DL (ref 8.3–10.1)
CHLORIDE SERPL-SCNC: 104 MMOL/L (ref 100–108)
CO2 SERPL-SCNC: 28 MMOL/L (ref 21–32)
CREAT SERPL-MCNC: 1.12 MG/DL (ref 0.6–1.3)
EOSINOPHIL # BLD AUTO: 0.23 THOUSAND/ΜL (ref 0–0.61)
EOSINOPHIL NFR BLD AUTO: 2 % (ref 0–6)
ERYTHROCYTE [DISTWIDTH] IN BLOOD BY AUTOMATED COUNT: 12.7 % (ref 11.6–15.1)
GFR SERPL CREATININE-BSD FRML MDRD: 55 ML/MIN/1.73SQ M
GLUCOSE P FAST SERPL-MCNC: 98 MG/DL (ref 65–99)
HCT VFR BLD AUTO: 49.9 % (ref 34.8–46.1)
HGB BLD-MCNC: 16.3 G/DL (ref 11.5–15.4)
IMM GRANULOCYTES # BLD AUTO: 0.02 THOUSAND/UL (ref 0–0.2)
IMM GRANULOCYTES NFR BLD AUTO: 0 % (ref 0–2)
INSULIN SERPL-ACNC: 8.5 MU/L (ref 3–25)
LDLC SERPL DIRECT ASSAY-MCNC: 88 MG/DL (ref 0–100)
LYMPHOCYTES # BLD AUTO: 3.15 THOUSANDS/ΜL (ref 0.6–4.47)
LYMPHOCYTES NFR BLD AUTO: 30 % (ref 14–44)
MCH RBC QN AUTO: 29.7 PG (ref 26.8–34.3)
MCHC RBC AUTO-ENTMCNC: 32.7 G/DL (ref 31.4–37.4)
MCV RBC AUTO: 91 FL (ref 82–98)
MONOCYTES # BLD AUTO: 0.58 THOUSAND/ΜL (ref 0.17–1.22)
MONOCYTES NFR BLD AUTO: 6 % (ref 4–12)
NEUTROPHILS # BLD AUTO: 6.4 THOUSANDS/ΜL (ref 1.85–7.62)
NEUTS SEG NFR BLD AUTO: 61 % (ref 43–75)
NRBC BLD AUTO-RTO: 0 /100 WBCS
PLATELET # BLD AUTO: 297 THOUSANDS/UL (ref 149–390)
PMV BLD AUTO: 10.4 FL (ref 8.9–12.7)
POTASSIUM SERPL-SCNC: 4.1 MMOL/L (ref 3.5–5.3)
PROT SERPL-MCNC: 7.5 G/DL (ref 6.4–8.2)
RBC # BLD AUTO: 5.48 MILLION/UL (ref 3.81–5.12)
SODIUM SERPL-SCNC: 138 MMOL/L (ref 136–145)
TSH SERPL DL<=0.05 MIU/L-ACNC: 2.2 UIU/ML (ref 0.36–3.74)
WBC # BLD AUTO: 10.45 THOUSAND/UL (ref 4.31–10.16)

## 2021-05-07 PROCEDURE — 83525 ASSAY OF INSULIN: CPT

## 2021-05-07 PROCEDURE — 83721 ASSAY OF BLOOD LIPOPROTEIN: CPT

## 2021-05-07 PROCEDURE — 71260 CT THORAX DX C+: CPT

## 2021-05-07 PROCEDURE — G1004 CDSM NDSC: HCPCS

## 2021-05-07 PROCEDURE — 84443 ASSAY THYROID STIM HORMONE: CPT

## 2021-05-07 PROCEDURE — 80053 COMPREHEN METABOLIC PANEL: CPT

## 2021-05-07 PROCEDURE — 82306 VITAMIN D 25 HYDROXY: CPT

## 2021-05-07 PROCEDURE — 36415 COLL VENOUS BLD VENIPUNCTURE: CPT

## 2021-05-07 PROCEDURE — 85025 COMPLETE CBC W/AUTO DIFF WBC: CPT

## 2021-05-07 RX ADMIN — IOHEXOL 85 ML: 350 INJECTION, SOLUTION INTRAVENOUS at 09:39

## 2021-05-11 ENCOUNTER — TELEPHONE (OUTPATIENT)
Dept: BARIATRICS | Facility: CLINIC | Age: 56
End: 2021-05-11

## 2021-05-12 DIAGNOSIS — F41.9 ANXIETY: ICD-10-CM

## 2021-05-12 RX ORDER — ALPRAZOLAM 0.5 MG/1
0.5 TABLET ORAL 2 TIMES DAILY PRN
Qty: 60 TABLET | Refills: 0 | Status: SHIPPED | OUTPATIENT
Start: 2021-05-14 | End: 2021-06-15 | Stop reason: SDUPTHER

## 2021-06-07 DIAGNOSIS — E55.9 VITAMIN D DEFICIENCY: ICD-10-CM

## 2021-06-07 RX ORDER — ERGOCALCIFEROL 1.25 MG/1
CAPSULE ORAL
Qty: 12 CAPSULE | Refills: 3 | Status: SHIPPED | OUTPATIENT
Start: 2021-06-07 | End: 2022-04-24

## 2021-06-15 DIAGNOSIS — F41.9 ANXIETY: ICD-10-CM

## 2021-06-15 RX ORDER — ALPRAZOLAM 0.5 MG/1
0.5 TABLET ORAL 2 TIMES DAILY PRN
Qty: 60 TABLET | Refills: 0 | Status: SHIPPED | OUTPATIENT
Start: 2021-06-15 | End: 2021-07-13 | Stop reason: SDUPTHER

## 2021-07-07 DIAGNOSIS — E78.2 MIXED HYPERLIPIDEMIA: ICD-10-CM

## 2021-07-07 RX ORDER — ATORVASTATIN CALCIUM 10 MG/1
10 TABLET, FILM COATED ORAL DAILY
Qty: 30 TABLET | Refills: 5 | Status: SHIPPED | OUTPATIENT
Start: 2021-07-07 | End: 2021-07-26 | Stop reason: SINTOL

## 2021-07-13 DIAGNOSIS — F41.9 ANXIETY: ICD-10-CM

## 2021-07-13 RX ORDER — ALPRAZOLAM 0.5 MG/1
0.5 TABLET ORAL 2 TIMES DAILY PRN
Qty: 60 TABLET | Refills: 0 | Status: SHIPPED | OUTPATIENT
Start: 2021-07-14 | End: 2021-08-10 | Stop reason: SDUPTHER

## 2021-07-26 DIAGNOSIS — E78.2 MIXED HYPERLIPIDEMIA: Primary | ICD-10-CM

## 2021-07-26 RX ORDER — EZETIMIBE 10 MG/1
10 TABLET ORAL DAILY
Qty: 30 TABLET | Refills: 1 | Status: SHIPPED | OUTPATIENT
Start: 2021-07-26 | End: 2021-09-19

## 2021-07-26 NOTE — TELEPHONE ENCOUNTER
Can dc the statin  If she wants to try an alternate would check if zetia 10mg daily covered - it is not a statin

## 2021-08-10 DIAGNOSIS — F41.9 ANXIETY: ICD-10-CM

## 2021-08-10 RX ORDER — ALPRAZOLAM 0.5 MG/1
0.5 TABLET ORAL 2 TIMES DAILY PRN
Qty: 60 TABLET | Refills: 0 | Status: SHIPPED | OUTPATIENT
Start: 2021-08-12 | End: 2021-09-07 | Stop reason: SDUPTHER

## 2021-08-26 ENCOUNTER — HOSPITAL ENCOUNTER (OUTPATIENT)
Dept: MAMMOGRAPHY | Facility: HOSPITAL | Age: 56
Discharge: HOME | End: 2021-08-26
Attending: INTERNAL MEDICINE
Payer: COMMERCIAL

## 2021-08-26 ENCOUNTER — HOSPITAL ENCOUNTER (OUTPATIENT)
Dept: ULTRASOUND IMAGING | Facility: HOSPITAL | Age: 56
Discharge: HOME | End: 2021-08-26
Payer: COMMERCIAL

## 2021-08-26 VITALS — BODY MASS INDEX: 31.54 KG/M2 | HEIGHT: 63 IN | WEIGHT: 178 LBS

## 2021-08-26 DIAGNOSIS — R92.8 ABNORMAL MAMMOGRAM: ICD-10-CM

## 2021-08-26 PROCEDURE — 77066 DX MAMMO INCL CAD BI: CPT

## 2021-08-26 PROCEDURE — G0279 TOMOSYNTHESIS, MAMMO: HCPCS

## 2021-08-26 PROCEDURE — 76642 ULTRASOUND BREAST LIMITED: CPT

## 2021-09-03 ENCOUNTER — TELEPHONE (OUTPATIENT)
Dept: FAMILY MEDICINE CLINIC | Facility: CLINIC | Age: 56
End: 2021-09-03

## 2021-09-03 DIAGNOSIS — R10.2 PELVIC PAIN: Primary | ICD-10-CM

## 2021-09-03 NOTE — TELEPHONE ENCOUNTER
She had a hysterectomy at age 34, she had a spot on her cervix that was cancer  She is now having ovary pain, it feels like it is  "turning"    She has not seen gyn in 10 years (Dr Kartik Molina)

## 2021-09-03 NOTE — TELEPHONE ENCOUNTER
If she still has her ovaries then would order Pelvic US  If she had total hysterectomy would setup GYN followup - can refer to our GYN if she is ok with that

## 2021-09-07 ENCOUNTER — TELEPHONE (OUTPATIENT)
Dept: FAMILY MEDICINE CLINIC | Facility: CLINIC | Age: 56
End: 2021-09-07

## 2021-09-07 DIAGNOSIS — F41.9 ANXIETY: ICD-10-CM

## 2021-09-07 RX ORDER — ALPRAZOLAM 0.5 MG/1
0.5 TABLET ORAL 2 TIMES DAILY PRN
Qty: 60 TABLET | Refills: 0 | Status: SHIPPED | OUTPATIENT
Start: 2021-09-07 | End: 2021-10-06 | Stop reason: SDUPTHER

## 2021-09-30 ENCOUNTER — TELEPHONE (OUTPATIENT)
Dept: FAMILY MEDICINE CLINIC | Facility: CLINIC | Age: 56
End: 2021-09-30

## 2021-09-30 DIAGNOSIS — J06.9 UPPER RESPIRATORY TRACT INFECTION, UNSPECIFIED TYPE: Primary | ICD-10-CM

## 2021-09-30 DIAGNOSIS — N76.1 SUBACUTE VAGINITIS: Primary | ICD-10-CM

## 2021-09-30 RX ORDER — FLUCONAZOLE 150 MG/1
150 TABLET ORAL ONCE
Qty: 1 TABLET | Refills: 0 | Status: SHIPPED | OUTPATIENT
Start: 2021-09-30 | End: 2021-09-30

## 2021-09-30 RX ORDER — AMOXICILLIN 500 MG/1
500 CAPSULE ORAL EVERY 8 HOURS SCHEDULED
Qty: 21 CAPSULE | Refills: 0 | Status: SHIPPED | OUTPATIENT
Start: 2021-09-30 | End: 2021-10-07

## 2021-10-06 DIAGNOSIS — F41.9 ANXIETY: ICD-10-CM

## 2021-10-06 RX ORDER — ALPRAZOLAM 0.5 MG/1
0.5 TABLET ORAL 2 TIMES DAILY PRN
Qty: 60 TABLET | Refills: 0 | Status: SHIPPED | OUTPATIENT
Start: 2021-10-06 | End: 2021-11-02 | Stop reason: SDUPTHER

## 2021-10-14 ENCOUNTER — TELEPHONE (OUTPATIENT)
Dept: FAMILY MEDICINE CLINIC | Facility: CLINIC | Age: 56
End: 2021-10-14

## 2021-10-14 DIAGNOSIS — R05.9 COUGH: Primary | ICD-10-CM

## 2021-10-14 DIAGNOSIS — B34.9 VIRAL INFECTION, UNSPECIFIED: Primary | ICD-10-CM

## 2021-10-14 PROCEDURE — U0005 INFEC AGEN DETEC AMPLI PROBE: HCPCS | Performed by: INTERNAL MEDICINE

## 2021-10-14 PROCEDURE — U0003 INFECTIOUS AGENT DETECTION BY NUCLEIC ACID (DNA OR RNA); SEVERE ACUTE RESPIRATORY SYNDROME CORONAVIRUS 2 (SARS-COV-2) (CORONAVIRUS DISEASE [COVID-19]), AMPLIFIED PROBE TECHNIQUE, MAKING USE OF HIGH THROUGHPUT TECHNOLOGIES AS DESCRIBED BY CMS-2020-01-R: HCPCS | Performed by: INTERNAL MEDICINE

## 2021-10-14 RX ORDER — PREDNISONE 10 MG/1
10 TABLET ORAL DAILY
Qty: 5 TABLET | Refills: 0 | Status: SHIPPED | OUTPATIENT
Start: 2021-10-14 | End: 2021-12-14

## 2021-10-18 ENCOUNTER — TELEPHONE (OUTPATIENT)
Dept: FAMILY MEDICINE CLINIC | Facility: CLINIC | Age: 56
End: 2021-10-18

## 2021-10-18 DIAGNOSIS — J06.9 UPPER RESPIRATORY TRACT INFECTION, UNSPECIFIED TYPE: Primary | ICD-10-CM

## 2021-10-18 RX ORDER — LEVOFLOXACIN 500 MG/1
500 TABLET, FILM COATED ORAL EVERY 24 HOURS
Qty: 7 TABLET | Refills: 0 | Status: SHIPPED | OUTPATIENT
Start: 2021-10-18 | End: 2021-10-25

## 2021-10-21 ENCOUNTER — OFFICE VISIT (OUTPATIENT)
Dept: FAMILY MEDICINE CLINIC | Facility: CLINIC | Age: 56
End: 2021-10-21
Payer: COMMERCIAL

## 2021-10-21 VITALS
HEIGHT: 63 IN | DIASTOLIC BLOOD PRESSURE: 68 MMHG | TEMPERATURE: 97.9 F | SYSTOLIC BLOOD PRESSURE: 124 MMHG | WEIGHT: 183.6 LBS | BODY MASS INDEX: 32.53 KG/M2

## 2021-10-21 DIAGNOSIS — E78.2 MIXED HYPERLIPIDEMIA: ICD-10-CM

## 2021-10-21 DIAGNOSIS — J43.9 PULMONARY EMPHYSEMA, UNSPECIFIED EMPHYSEMA TYPE (HCC): Primary | ICD-10-CM

## 2021-10-21 DIAGNOSIS — F41.9 ANXIETY: ICD-10-CM

## 2021-10-21 DIAGNOSIS — I10 PRIMARY HYPERTENSION: ICD-10-CM

## 2021-10-21 DIAGNOSIS — Z12.11 SCREENING FOR COLON CANCER: ICD-10-CM

## 2021-10-21 PROBLEM — L72.3 SEBACEOUS CYST: Status: RESOLVED | Noted: 2021-01-21 | Resolved: 2021-10-21

## 2021-10-21 PROCEDURE — 99214 OFFICE O/P EST MOD 30 MIN: CPT | Performed by: INTERNAL MEDICINE

## 2021-10-27 ENCOUNTER — TELEPHONE (OUTPATIENT)
Dept: FAMILY MEDICINE CLINIC | Facility: CLINIC | Age: 56
End: 2021-10-27

## 2021-10-27 DIAGNOSIS — J06.9 UPPER RESPIRATORY TRACT INFECTION, UNSPECIFIED TYPE: Primary | ICD-10-CM

## 2021-10-27 RX ORDER — AMOXICILLIN 500 MG/1
500 CAPSULE ORAL EVERY 8 HOURS SCHEDULED
Qty: 30 CAPSULE | Refills: 0 | Status: SHIPPED | OUTPATIENT
Start: 2021-10-27 | End: 2021-11-06

## 2021-11-02 ENCOUNTER — TELEPHONE (OUTPATIENT)
Dept: FAMILY MEDICINE CLINIC | Facility: CLINIC | Age: 56
End: 2021-11-02

## 2021-11-02 DIAGNOSIS — B37.9 YEAST INFECTION: Primary | ICD-10-CM

## 2021-11-02 DIAGNOSIS — F41.9 ANXIETY: ICD-10-CM

## 2021-11-02 RX ORDER — ALPRAZOLAM 0.5 MG/1
0.5 TABLET ORAL 2 TIMES DAILY PRN
Qty: 60 TABLET | Refills: 0 | Status: SHIPPED | OUTPATIENT
Start: 2021-11-02 | End: 2021-12-03 | Stop reason: SDUPTHER

## 2021-11-02 RX ORDER — FLUCONAZOLE 150 MG/1
150 TABLET ORAL ONCE
Qty: 1 TABLET | Refills: 0 | Status: SHIPPED | OUTPATIENT
Start: 2021-11-02 | End: 2021-11-02

## 2021-12-03 DIAGNOSIS — F41.9 ANXIETY: ICD-10-CM

## 2021-12-03 RX ORDER — ALPRAZOLAM 0.5 MG/1
0.5 TABLET ORAL 2 TIMES DAILY PRN
Qty: 60 TABLET | Refills: 0 | Status: SHIPPED | OUTPATIENT
Start: 2021-12-03 | End: 2022-01-04 | Stop reason: SDUPTHER

## 2021-12-14 ENCOUNTER — OFFICE VISIT (OUTPATIENT)
Dept: OBGYN CLINIC | Facility: CLINIC | Age: 56
End: 2021-12-14
Payer: COMMERCIAL

## 2021-12-14 VITALS
WEIGHT: 194 LBS | HEIGHT: 63 IN | BODY MASS INDEX: 34.38 KG/M2 | DIASTOLIC BLOOD PRESSURE: 74 MMHG | SYSTOLIC BLOOD PRESSURE: 130 MMHG

## 2021-12-14 DIAGNOSIS — L02.92 BOILS OF MULTIPLE SITES: ICD-10-CM

## 2021-12-14 DIAGNOSIS — Z01.419 WELL WOMAN EXAM WITH ROUTINE GYNECOLOGICAL EXAM: Primary | ICD-10-CM

## 2021-12-14 DIAGNOSIS — Z53.20 COLON CANCER SCREENING DECLINED: ICD-10-CM

## 2021-12-14 PROCEDURE — 99386 PREV VISIT NEW AGE 40-64: CPT | Performed by: NURSE PRACTITIONER

## 2021-12-14 PROCEDURE — 0503F POSTPARTUM CARE VISIT: CPT | Performed by: NURSE PRACTITIONER

## 2021-12-20 ENCOUNTER — TELEPHONE (OUTPATIENT)
Dept: FAMILY MEDICINE CLINIC | Facility: CLINIC | Age: 56
End: 2021-12-20

## 2021-12-20 DIAGNOSIS — R11.0 NAUSEA: Primary | ICD-10-CM

## 2021-12-20 RX ORDER — ONDANSETRON 4 MG/1
4 TABLET, FILM COATED ORAL EVERY 8 HOURS PRN
Qty: 12 TABLET | Refills: 0 | Status: SHIPPED | OUTPATIENT
Start: 2021-12-20 | End: 2022-04-22

## 2022-01-04 DIAGNOSIS — F41.9 ANXIETY: ICD-10-CM

## 2022-01-04 RX ORDER — ALPRAZOLAM 0.5 MG/1
0.5 TABLET ORAL 2 TIMES DAILY PRN
Qty: 60 TABLET | Refills: 0 | Status: SHIPPED | OUTPATIENT
Start: 2022-01-04 | End: 2022-02-04 | Stop reason: SDUPTHER

## 2022-01-12 ENCOUNTER — TELEPHONE (OUTPATIENT)
Dept: FAMILY MEDICINE CLINIC | Facility: CLINIC | Age: 57
End: 2022-01-12

## 2022-01-12 DIAGNOSIS — N76.1 SUBACUTE VAGINITIS: Primary | ICD-10-CM

## 2022-01-12 RX ORDER — FLUCONAZOLE 150 MG/1
150 TABLET ORAL ONCE
Qty: 1 TABLET | Refills: 0 | Status: SHIPPED | OUTPATIENT
Start: 2022-01-12 | End: 2022-01-12

## 2022-01-12 NOTE — TELEPHONE ENCOUNTER
Pt called asking for an rx for a yeast infection  Said she was taking an antibiotic and she developed a yeast infection  She uses [facility] in [place]

## 2022-02-04 DIAGNOSIS — F41.9 ANXIETY: ICD-10-CM

## 2022-02-04 RX ORDER — ALPRAZOLAM 0.5 MG/1
0.5 TABLET ORAL 2 TIMES DAILY PRN
Qty: 60 TABLET | Refills: 0 | Status: SHIPPED | OUTPATIENT
Start: 2022-02-04 | End: 2022-03-01 | Stop reason: SDUPTHER

## 2022-02-14 ENCOUNTER — TELEPHONE (OUTPATIENT)
Dept: FAMILY MEDICINE CLINIC | Facility: CLINIC | Age: 57
End: 2022-02-14

## 2022-02-14 DIAGNOSIS — N76.1 SUBACUTE VAGINITIS: ICD-10-CM

## 2022-02-14 DIAGNOSIS — R05.9 COUGH: Primary | ICD-10-CM

## 2022-02-14 DIAGNOSIS — B34.9 VIRAL INFECTION, UNSPECIFIED: Primary | ICD-10-CM

## 2022-02-14 PROCEDURE — U0005 INFEC AGEN DETEC AMPLI PROBE: HCPCS | Performed by: INTERNAL MEDICINE

## 2022-02-14 PROCEDURE — U0003 INFECTIOUS AGENT DETECTION BY NUCLEIC ACID (DNA OR RNA); SEVERE ACUTE RESPIRATORY SYNDROME CORONAVIRUS 2 (SARS-COV-2) (CORONAVIRUS DISEASE [COVID-19]), AMPLIFIED PROBE TECHNIQUE, MAKING USE OF HIGH THROUGHPUT TECHNOLOGIES AS DESCRIBED BY CMS-2020-01-R: HCPCS | Performed by: INTERNAL MEDICINE

## 2022-02-14 RX ORDER — ALBUTEROL SULFATE 90 UG/1
2 AEROSOL, METERED RESPIRATORY (INHALATION) EVERY 6 HOURS PRN
Qty: 8 G | Refills: 0 | Status: SHIPPED | OUTPATIENT
Start: 2022-02-14 | End: 2022-03-11 | Stop reason: SDUPTHER

## 2022-02-14 RX ORDER — AZITHROMYCIN 250 MG/1
TABLET, FILM COATED ORAL
Qty: 6 TABLET | Refills: 0 | Status: SHIPPED | OUTPATIENT
Start: 2022-02-14 | End: 2022-02-18

## 2022-02-14 RX ORDER — FLUCONAZOLE 150 MG/1
150 TABLET ORAL ONCE
Qty: 1 TABLET | Refills: 0 | Status: SHIPPED | OUTPATIENT
Start: 2022-02-14 | End: 2022-02-14

## 2022-02-14 NOTE — TELEPHONE ENCOUNTER
Pt asking if an inhaler could also be prescribed as she is having some wheezing? Also, pt said whenever she takes an antibiotic, she usually needs a yeast infection pill as well?

## 2022-02-14 NOTE — TELEPHONE ENCOUNTER
Pt said she started with congestion, fatigue, body chills, a sore throat, and a cough Saturday morning, 2/12/22  She is vaccinated with booster and is taking NyQuil and Ibuprofen for her sxs with little relief  She did say she was around someone who tested positive, but it was after their quarantine  Asking if you could send in anything for her sxs to [facility] in [place]?

## 2022-02-22 ENCOUNTER — TELEPHONE (OUTPATIENT)
Dept: FAMILY MEDICINE CLINIC | Facility: CLINIC | Age: 57
End: 2022-02-22

## 2022-02-22 DIAGNOSIS — J01.90 SUBACUTE SINUSITIS, UNSPECIFIED LOCATION: Primary | ICD-10-CM

## 2022-02-22 RX ORDER — AMOXICILLIN 500 MG/1
500 CAPSULE ORAL EVERY 8 HOURS SCHEDULED
Qty: 30 CAPSULE | Refills: 0 | Status: SHIPPED | OUTPATIENT
Start: 2022-02-22 | End: 2022-03-04

## 2022-02-22 RX ORDER — FLUCONAZOLE 150 MG/1
150 TABLET ORAL ONCE
Qty: 1 TABLET | Refills: 0 | Status: SHIPPED | OUTPATIENT
Start: 2022-02-22 | End: 2022-02-22

## 2022-02-22 NOTE — TELEPHONE ENCOUNTER
Pt still having sxs of congestion, fatigue, sore throat, and a cough  Had a covid test on 2/14/22, which was negative  Was prescribed a zpack, which she said helped while she was taking it, but she still has the sxs  Asking if you could prescribe amoxil, said it worked for her in the past, as well as diflucan since she would be taking an antibiotic  Pt uses [facility] in [place]  Please advise

## 2022-03-01 DIAGNOSIS — F41.9 ANXIETY: ICD-10-CM

## 2022-03-01 DIAGNOSIS — I10 ESSENTIAL HYPERTENSION: ICD-10-CM

## 2022-03-01 RX ORDER — METOPROLOL TARTRATE 50 MG/1
50 TABLET, FILM COATED ORAL EVERY 12 HOURS SCHEDULED
Qty: 180 TABLET | Refills: 3 | Status: SHIPPED | OUTPATIENT
Start: 2022-03-01

## 2022-03-01 RX ORDER — CITALOPRAM 20 MG/1
20 TABLET ORAL DAILY
Qty: 90 TABLET | Refills: 3 | Status: SHIPPED | OUTPATIENT
Start: 2022-03-01

## 2022-03-01 RX ORDER — ALPRAZOLAM 0.5 MG/1
0.5 TABLET ORAL 2 TIMES DAILY PRN
Qty: 60 TABLET | Refills: 0 | Status: SHIPPED | OUTPATIENT
Start: 2022-03-01 | End: 2022-04-01 | Stop reason: SDUPTHER

## 2022-03-08 DIAGNOSIS — E78.2 MIXED HYPERLIPIDEMIA: ICD-10-CM

## 2022-03-08 RX ORDER — EZETIMIBE 10 MG/1
TABLET ORAL
Qty: 30 TABLET | Refills: 5 | Status: SHIPPED | OUTPATIENT
Start: 2022-03-08

## 2022-03-11 ENCOUNTER — TELEPHONE (OUTPATIENT)
Dept: FAMILY MEDICINE CLINIC | Facility: CLINIC | Age: 57
End: 2022-03-11

## 2022-03-11 DIAGNOSIS — R05.9 COUGH: ICD-10-CM

## 2022-03-11 RX ORDER — ALBUTEROL SULFATE 90 UG/1
2 AEROSOL, METERED RESPIRATORY (INHALATION) EVERY 6 HOURS PRN
Qty: 8 G | Refills: 0 | Status: SHIPPED | OUTPATIENT
Start: 2022-03-11 | End: 2022-04-10

## 2022-03-11 NOTE — TELEPHONE ENCOUNTER
Pt called regarding a CT of her chest she had in May of 2021, said there was a benign mass on her left lung that was found  Asking if she has to have any follow up imaging or visits with anyone regarding this as she said her sister recently passed due to lung cancer, which has her worried now  Said she woke up with wheezing and some chest pain this morning

## 2022-04-01 DIAGNOSIS — F41.9 ANXIETY: ICD-10-CM

## 2022-04-01 RX ORDER — ALPRAZOLAM 0.5 MG/1
0.5 TABLET ORAL 2 TIMES DAILY PRN
Qty: 60 TABLET | Refills: 0 | Status: SHIPPED | OUTPATIENT
Start: 2022-04-01 | End: 2022-04-28 | Stop reason: SDUPTHER

## 2022-04-22 ENCOUNTER — OFFICE VISIT (OUTPATIENT)
Dept: FAMILY MEDICINE CLINIC | Facility: CLINIC | Age: 57
End: 2022-04-22
Payer: COMMERCIAL

## 2022-04-22 VITALS
BODY MASS INDEX: 34.73 KG/M2 | WEIGHT: 196 LBS | DIASTOLIC BLOOD PRESSURE: 78 MMHG | HEIGHT: 63 IN | TEMPERATURE: 97.8 F | RESPIRATION RATE: 18 BRPM | SYSTOLIC BLOOD PRESSURE: 126 MMHG | HEART RATE: 76 BPM

## 2022-04-22 DIAGNOSIS — E03.9 HYPOTHYROIDISM, UNSPECIFIED TYPE: ICD-10-CM

## 2022-04-22 DIAGNOSIS — E78.2 MIXED HYPERLIPIDEMIA: ICD-10-CM

## 2022-04-22 DIAGNOSIS — J44.9 CHRONIC OBSTRUCTIVE PULMONARY DISEASE, UNSPECIFIED COPD TYPE (HCC): ICD-10-CM

## 2022-04-22 DIAGNOSIS — R91.1 LUNG NODULE: Primary | ICD-10-CM

## 2022-04-22 DIAGNOSIS — E55.9 VITAMIN D DEFICIENCY: ICD-10-CM

## 2022-04-22 PROCEDURE — 99214 OFFICE O/P EST MOD 30 MIN: CPT | Performed by: INTERNAL MEDICINE

## 2022-04-22 NOTE — PROGRESS NOTES
Assessment/Plan:    BMI Counseling: Body mass index is 34 72 kg/m²  The BMI is above normal  Nutrition recommendations include decreasing portion sizes, consuming healthier snacks and moderation in carbohydrate intake  Exercise recommendations include exercising 3-5 times per week  Rationale for BMI follow-up plan is due to patient being overweight or obese  Diagnoses and all orders for this visit:    Lung nodule  -     CT chest w contrast; Future  -     CBC and differential; Future  Due for repeat screen She has vague left sided chest sxs but seem to be muscular due to recent workout schedule which has been quite beneficial for patient     Mixed hyperlipidemia  -     Lipid panel; Future  -     Comprehensive metabolic panel; Future  Rx for fbw  She is exercising and eating healthier Takes supplement daily   Vitamin D deficiency  -     Vitamin D 25 hydroxy; Future    Hypothyroidism, unspecified type  -     TSH, 3rd generation; Future    Chronic obstructive pulmonary disease, unspecified COPD type ([facility] )  -     CBC and differential; Future  Pt does feel breathing has improved since she has regular exercise regimen and working with a grey       Rto 6months      Patient ID: Danny Hilario is a 64 y o  female  HPI   Pt doing better overall since she has started working out in a friend's home She does a regular regimen which sounds quite inclusive and overall strengthening She has noted clothes fit better She has some vague left sided discomfort but feels that may be muscular her breathing seems better overall and she is happy she is feeling better overall since exercising She is due for CT scan of lung to check nodule       Review of Systems   Constitutional: Positive for activity change  Negative for chills and fever  HENT: Negative  Eyes: Negative for visual disturbance  Respiratory: Negative for cough and shortness of breath  Cardiovascular: Positive for chest pain   Negative for palpitations and leg swelling  Left sided discomfort since starting to exercise and it is lessening   Genitourinary: Negative  Musculoskeletal: Negative  Neurological: Negative for dizziness, light-headedness and headaches  Psychiatric/Behavioral: Negative for sleep disturbance  The patient is not nervous/anxious          Past Medical History:   Diagnosis Date    Anxiety     Hypertension     Lung nodule      Past Surgical History:   Procedure Laterality Date    BREAST EXCISIONAL BIOPSY Right     benign    BREAST EXCISIONAL BIOPSY Left     benign    CATARACT EXTRACTION, BILATERAL      HYSTERECTOMY      TUBAL LIGATION       Social History     Socioeconomic History    Marital status: /Civil Union     Spouse name: Not on file    Number of children: Not on file    Years of education: Not on file    Highest education level: Not on file   Occupational History    Not on file   Tobacco Use    Smoking status: Current Every Day Smoker     Packs/day: 2 00     Years: 40 00     Pack years: 80 00     Types: Cigarettes    Smokeless tobacco: Never Used   Vaping Use    Vaping Use: Never used   Substance and Sexual Activity    Alcohol use: Not Currently    Drug use: No    Sexual activity: Not Currently     Partners: Male     Birth control/protection: Female Sterilization   Other Topics Concern    Not on file   Social History Narrative    Dental care, occasionally    Lives independently with spouse    No living will    Sun protection - sunscreen    Uses safety equipment - seatbelts     Social Determinants of Health     Financial Resource Strain: Not on file   Food Insecurity: Not on file   Transportation Needs: Not on file   Physical Activity: Not on file   Stress: Not on file   Social Connections: Not on file   Intimate Partner Violence: Not on file   Housing Stability: Not on file     Allergies   Allergen Reactions    Lorazepam            /78   Pulse 76   Temp 97 8 °F (36 6 °C) (Temporal)   Resp 18 Ht 5' 3" (1 6 m)   Wt 88 9 kg (196 lb)   BMI 34 72 kg/m²          Physical Exam  Vitals reviewed  Constitutional:       General: She is not in acute distress  Appearance: Normal appearance  She is not ill-appearing, toxic-appearing or diaphoretic  HENT:      Head: Normocephalic and atraumatic  Right Ear: External ear normal       Left Ear: External ear normal       Nose: Nose normal       Mouth/Throat:      Mouth: Mucous membranes are dry  Eyes:      General: No scleral icterus  Extraocular Movements: Extraocular movements intact  Conjunctiva/sclera: Conjunctivae normal       Pupils: Pupils are equal, round, and reactive to light  Cardiovascular:      Rate and Rhythm: Normal rate and regular rhythm  Pulses: Normal pulses  Heart sounds: Normal heart sounds  Pulmonary:      Effort: Pulmonary effort is normal       Breath sounds: Normal breath sounds  Abdominal:      General: Bowel sounds are normal  There is no distension  Palpations: Abdomen is soft  Tenderness: There is no abdominal tenderness  Musculoskeletal:      Cervical back: Normal range of motion and neck supple  No rigidity  Right lower leg: No edema  Left lower leg: No edema  Lymphadenopathy:      Cervical: No cervical adenopathy  Skin:     General: Skin is dry  Neurological:      General: No focal deficit present  Mental Status: She is alert and oriented to person, place, and time  Mental status is at baseline  Psychiatric:         Mood and Affect: Mood normal          Behavior: Behavior normal          Thought Content:  Thought content normal          Judgment: Judgment normal

## 2022-04-23 ENCOUNTER — APPOINTMENT (OUTPATIENT)
Dept: LAB | Facility: MEDICAL CENTER | Age: 57
End: 2022-04-23
Payer: COMMERCIAL

## 2022-04-23 DIAGNOSIS — E78.2 MIXED HYPERLIPIDEMIA: ICD-10-CM

## 2022-04-23 DIAGNOSIS — E03.9 HYPOTHYROIDISM, UNSPECIFIED TYPE: ICD-10-CM

## 2022-04-23 DIAGNOSIS — E55.9 VITAMIN D DEFICIENCY: ICD-10-CM

## 2022-04-23 DIAGNOSIS — J44.9 CHRONIC OBSTRUCTIVE PULMONARY DISEASE, UNSPECIFIED COPD TYPE (HCC): ICD-10-CM

## 2022-04-23 DIAGNOSIS — R91.1 LUNG NODULE: ICD-10-CM

## 2022-04-23 LAB
25(OH)D3 SERPL-MCNC: 75.6 NG/ML (ref 30–100)
ALBUMIN SERPL BCP-MCNC: 3.4 G/DL (ref 3.5–5)
ALP SERPL-CCNC: 119 U/L (ref 46–116)
ALT SERPL W P-5'-P-CCNC: 22 U/L (ref 12–78)
ANION GAP SERPL CALCULATED.3IONS-SCNC: 5 MMOL/L (ref 4–13)
AST SERPL W P-5'-P-CCNC: 14 U/L (ref 5–45)
BASOPHILS # BLD AUTO: 0.06 THOUSANDS/ΜL (ref 0–0.1)
BASOPHILS NFR BLD AUTO: 1 % (ref 0–1)
BILIRUB SERPL-MCNC: 0.52 MG/DL (ref 0.2–1)
BUN SERPL-MCNC: 10 MG/DL (ref 5–25)
CALCIUM ALBUM COR SERPL-MCNC: 9.2 MG/DL (ref 8.3–10.1)
CALCIUM SERPL-MCNC: 8.7 MG/DL (ref 8.3–10.1)
CHLORIDE SERPL-SCNC: 103 MMOL/L (ref 100–108)
CHOLEST SERPL-MCNC: 173 MG/DL
CO2 SERPL-SCNC: 29 MMOL/L (ref 21–32)
CREAT SERPL-MCNC: 1.06 MG/DL (ref 0.6–1.3)
EOSINOPHIL # BLD AUTO: 0.16 THOUSAND/ΜL (ref 0–0.61)
EOSINOPHIL NFR BLD AUTO: 2 % (ref 0–6)
ERYTHROCYTE [DISTWIDTH] IN BLOOD BY AUTOMATED COUNT: 12.8 % (ref 11.6–15.1)
GFR SERPL CREATININE-BSD FRML MDRD: 58 ML/MIN/1.73SQ M
GLUCOSE P FAST SERPL-MCNC: 105 MG/DL (ref 65–99)
HCT VFR BLD AUTO: 53.2 % (ref 34.8–46.1)
HDLC SERPL-MCNC: 40 MG/DL
HGB BLD-MCNC: 17.2 G/DL (ref 11.5–15.4)
IMM GRANULOCYTES # BLD AUTO: 0.02 THOUSAND/UL (ref 0–0.2)
IMM GRANULOCYTES NFR BLD AUTO: 0 % (ref 0–2)
LDLC SERPL CALC-MCNC: 100 MG/DL (ref 0–100)
LYMPHOCYTES # BLD AUTO: 3 THOUSANDS/ΜL (ref 0.6–4.47)
LYMPHOCYTES NFR BLD AUTO: 36 % (ref 14–44)
MCH RBC QN AUTO: 29 PG (ref 26.8–34.3)
MCHC RBC AUTO-ENTMCNC: 32.3 G/DL (ref 31.4–37.4)
MCV RBC AUTO: 90 FL (ref 82–98)
MONOCYTES # BLD AUTO: 0.46 THOUSAND/ΜL (ref 0.17–1.22)
MONOCYTES NFR BLD AUTO: 6 % (ref 4–12)
NEUTROPHILS # BLD AUTO: 4.72 THOUSANDS/ΜL (ref 1.85–7.62)
NEUTS SEG NFR BLD AUTO: 55 % (ref 43–75)
NONHDLC SERPL-MCNC: 133 MG/DL
NRBC BLD AUTO-RTO: 0 /100 WBCS
PLATELET # BLD AUTO: 291 THOUSANDS/UL (ref 149–390)
PMV BLD AUTO: 11.1 FL (ref 8.9–12.7)
POTASSIUM SERPL-SCNC: 4.1 MMOL/L (ref 3.5–5.3)
PROT SERPL-MCNC: 7 G/DL (ref 6.4–8.2)
RBC # BLD AUTO: 5.94 MILLION/UL (ref 3.81–5.12)
SODIUM SERPL-SCNC: 137 MMOL/L (ref 136–145)
TRIGL SERPL-MCNC: 167 MG/DL
TSH SERPL DL<=0.05 MIU/L-ACNC: 2.04 UIU/ML (ref 0.45–4.5)
WBC # BLD AUTO: 8.42 THOUSAND/UL (ref 4.31–10.16)

## 2022-04-23 PROCEDURE — 80053 COMPREHEN METABOLIC PANEL: CPT

## 2022-04-23 PROCEDURE — 82306 VITAMIN D 25 HYDROXY: CPT

## 2022-04-23 PROCEDURE — 85025 COMPLETE CBC W/AUTO DIFF WBC: CPT

## 2022-04-23 PROCEDURE — 36415 COLL VENOUS BLD VENIPUNCTURE: CPT

## 2022-04-23 PROCEDURE — 80061 LIPID PANEL: CPT

## 2022-04-23 PROCEDURE — 84443 ASSAY THYROID STIM HORMONE: CPT

## 2022-04-24 DIAGNOSIS — E55.9 VITAMIN D DEFICIENCY: ICD-10-CM

## 2022-04-24 RX ORDER — ERGOCALCIFEROL 1.25 MG/1
CAPSULE ORAL
Qty: 12 CAPSULE | Refills: 3 | Status: SHIPPED | OUTPATIENT
Start: 2022-04-24

## 2022-04-28 DIAGNOSIS — F41.9 ANXIETY: ICD-10-CM

## 2022-04-28 RX ORDER — ALPRAZOLAM 0.5 MG/1
0.5 TABLET ORAL 2 TIMES DAILY PRN
Qty: 60 TABLET | Refills: 0 | Status: SHIPPED | OUTPATIENT
Start: 2022-04-28 | End: 2022-06-01 | Stop reason: SDUPTHER

## 2022-04-29 ENCOUNTER — HOSPITAL ENCOUNTER (OUTPATIENT)
Dept: CT IMAGING | Facility: HOSPITAL | Age: 57
Discharge: HOME | End: 2022-04-29
Attending: INTERNAL MEDICINE
Payer: COMMERCIAL

## 2022-04-29 DIAGNOSIS — R91.1 LUNG NODULE: ICD-10-CM

## 2022-04-29 PROCEDURE — 71260 CT THORAX DX C+: CPT

## 2022-04-29 PROCEDURE — G1004 CDSM NDSC: HCPCS

## 2022-04-29 RX ADMIN — IOHEXOL 85 ML: 350 INJECTION, SOLUTION INTRAVENOUS at 11:15

## 2022-05-10 ENCOUNTER — VBI (OUTPATIENT)
Dept: ADMINISTRATIVE | Facility: OTHER | Age: 57
End: 2022-05-10

## 2022-05-15 ENCOUNTER — APPOINTMENT (EMERGENCY)
Dept: RADIOLOGY | Facility: HOSPITAL | Age: 57
End: 2022-05-15
Payer: COMMERCIAL

## 2022-05-15 ENCOUNTER — HOSPITAL ENCOUNTER (EMERGENCY)
Facility: HOSPITAL | Age: 57
Discharge: HOME | End: 2022-05-15
Attending: EMERGENCY MEDICINE
Payer: COMMERCIAL

## 2022-05-15 VITALS
WEIGHT: 200 LBS | HEART RATE: 92 BPM | DIASTOLIC BLOOD PRESSURE: 70 MMHG | TEMPERATURE: 98 F | RESPIRATION RATE: 16 BRPM | SYSTOLIC BLOOD PRESSURE: 162 MMHG | OXYGEN SATURATION: 97 % | BODY MASS INDEX: 35.43 KG/M2

## 2022-05-15 DIAGNOSIS — S60.221A CONTUSION OF RIGHT HAND, INITIAL ENCOUNTER: Primary | ICD-10-CM

## 2022-05-15 PROCEDURE — 73130 X-RAY EXAM OF HAND: CPT

## 2022-05-15 PROCEDURE — 99282 EMERGENCY DEPT VISIT SF MDM: CPT | Performed by: PHYSICIAN ASSISTANT

## 2022-05-15 PROCEDURE — 99283 EMERGENCY DEPT VISIT LOW MDM: CPT

## 2022-05-15 NOTE — ED PROVIDER NOTES
History  Chief Complaint   Patient presents with    Hand Injury     Several years ago the patient had a boxer fracture in her right hand  Today she hit that same hand off of a table and how having some pain and swelling in the same hand  The pain radiates up her arm       64year old right hand dominant female presents ambulatory from home for evaluation of right hand pain  Pt reports tonight she was playing with her 9 month lab puppy and got her hand hit between the dog's head and the coffee table  She reports she smacked it on the furniture  This occurred prior to arrival this evening  She states she has h/o boxer's fracture in this hand several years ago and this has her concerned with new injury  She reports pain throughout her 3rd-5th knuckles  She states this pain radiates into her hand and wrist   Denies swelling  Denies numbness, tingling  She reports pain worse with movement and lifting things  Took advil prior to arrival     She has otherwise been in usual state of health  Denies recent illness  No other injuries reported  History provided by:  Patient   used: No    Hand Injury  Location:  Hand  Hand location:  R hand  Injury: yes    Pain details:     Quality:  Aching    Timing:  Constant    Progression:  Unchanged  Handedness:  Right-handed  Dislocation: no    Foreign body present:  No foreign bodies  Prior injury to area:  Yes  Relieved by:  Rest and NSAIDs  Worsened by: Movement  Associated symptoms: no back pain, no decreased range of motion, no fatigue, no fever, no neck pain, no numbness, no swelling and no tingling    Risk factors: no concern for non-accidental trauma, no known bone disorder, no frequent fractures and no recent illness        Prior to Admission Medications   Prescriptions Last Dose Informant Patient Reported? Taking?    ALPRAZolam (XANAX) 0 5 mg tablet   No No   Sig: Take 1 tablet (0 5 mg total) by mouth 2 (two) times a day as needed for anxiety   aspirin (ECOTRIN) 325 mg EC tablet   Yes No   Sig: Take by mouth   b complex vitamins tablet   Yes No   Sig: Take 1 tablet by mouth daily   citalopram (CeleXA) 20 mg tablet   No No   Sig: Take 1 tablet (20 mg total) by mouth daily   ergocalciferol (VITAMIN D2) 50,000 units   No No   Sig: take 1 capsule by mouth every week   ezetimibe (ZETIA) 10 mg tablet   No No   Sig: take 1 tablet by mouth once daily   metoprolol tartrate (LOPRESSOR) 50 mg tablet   No No   Sig: Take 1 tablet (50 mg total) by mouth every 12 (twelve) hours   ondansetron (ZOFRAN) 4 mg tablet   No No   Sig: Take 1 tablet (4 mg total) by mouth every 8 (eight) hours as needed for nausea or vomiting for up to 4 days      Facility-Administered Medications: None       Past Medical History:   Diagnosis Date    Anxiety     Hypertension     Lung nodule        Past Surgical History:   Procedure Laterality Date    BREAST EXCISIONAL BIOPSY Right     benign    BREAST EXCISIONAL BIOPSY Left     benign    CATARACT EXTRACTION, BILATERAL      HYSTERECTOMY      TUBAL LIGATION         Family History   Problem Relation Age of Onset    Hypertension Mother     Stroke Mother     Heart disease Mother     Parkinsonism Mother     Dementia Mother     Coronary artery disease Father     Glaucoma Father     Hypertension Father     Stroke Father     Diabetes Sister     Heart disease Sister     Lung disease Sister     Lung cancer Sister     Diabetes Daughter     Cancer Maternal Grandmother         unknown origin     Skin cancer Maternal Grandfather     No Known Problems Paternal Grandmother     No Known Problems Paternal Grandfather     Bone cancer Sister     Heart disease Sister     Deep vein thrombosis Sister     No Known Problems Sister     No Known Problems Sister     No Known Problems Sister     No Known Problems Maternal Aunt     No Known Problems Maternal Aunt     No Known Problems Paternal Aunt     No Known Problems Paternal Aunt     No Known Problems Paternal Aunt     No Known Problems Paternal Aunt     No Known Problems Paternal Aunt     No Known Problems Paternal Aunt     No Known Problems Paternal Aunt     No Known Problems Paternal Aunt     No Known Problems Paternal Aunt     No Known Problems Paternal Aunt     Liver disease Brother     Lung disease Brother     Diabetes Son      I have reviewed and agree with the history as documented  E-Cigarette/Vaping    E-Cigarette Use Never User      E-Cigarette/Vaping Substances    Nicotine No     THC No     CBD No     Flavoring No     Other No     Unknown No      Social History     Tobacco Use    Smoking status: Current Every Day Smoker     Packs/day: 2 00     Years: 40 00     Pack years: 80 00     Types: Cigarettes    Smokeless tobacco: Never Used   Vaping Use    Vaping Use: Never used   Substance Use Topics    Alcohol use: Not Currently    Drug use: No       Review of Systems   Constitutional: Negative  Negative for chills, fatigue and fever  HENT: Negative  Negative for congestion, rhinorrhea and sore throat  Eyes: Negative  Respiratory: Negative  Negative for cough, shortness of breath and wheezing  Cardiovascular: Negative  Negative for chest pain  Gastrointestinal: Negative  Negative for abdominal pain, constipation, diarrhea, nausea and vomiting  Genitourinary: Negative  Musculoskeletal: Positive for arthralgias  Negative for back pain, myalgias and neck pain  Skin: Negative  Negative for color change, rash and wound  Neurological: Negative  Negative for dizziness and headaches  Psychiatric/Behavioral: Negative  All other systems reviewed and are negative  Physical Exam  Physical Exam  Vitals and nursing note reviewed  Constitutional:       General: She is not in acute distress  Appearance: She is well-developed and overweight  She is not toxic-appearing  HENT:      Head: Normocephalic and atraumatic        Right Ear: Hearing and external ear normal       Left Ear: Hearing and external ear normal    Eyes:      General: Lids are normal  No scleral icterus  Conjunctiva/sclera: Conjunctivae normal    Neck:      Trachea: Trachea and phonation normal    Cardiovascular:      Rate and Rhythm: Normal rate and regular rhythm  Pulses: Normal pulses  Radial pulses are 2+ on the right side and 2+ on the left side  Pulmonary:      Effort: Pulmonary effort is normal  No tachypnea or respiratory distress  Musculoskeletal:      Right elbow: Normal       Right forearm: Normal       Right wrist: Normal  No swelling, deformity, bony tenderness, snuff box tenderness or crepitus  Normal range of motion  Normal pulse  Right hand: Tenderness present  No swelling, deformity or lacerations  Normal range of motion  Normal strength  Normal sensation  There is no disruption of two-point discrimination  Normal capillary refill  Normal pulse  Hands:       Right lower leg: No edema  Left lower leg: No edema  Skin:     General: Skin is warm and dry  Capillary Refill: Capillary refill takes less than 2 seconds  Findings: No abrasion, bruising, erythema, laceration, rash or wound  Neurological:      General: No focal deficit present  Mental Status: She is alert and oriented to person, place, and time  GCS: GCS eye subscore is 4  GCS verbal subscore is 5  GCS motor subscore is 6  Sensory: Sensation is intact  No sensory deficit  Motor: Motor function is intact  No weakness or abnormal muscle tone        Gait: Gait normal    Psychiatric:         Mood and Affect: Mood normal          Speech: Speech normal          Behavior: Behavior normal          Vital Signs  ED Triage Vitals [05/15/22 1827]   Temperature Pulse Respirations Blood Pressure SpO2   98 °F (36 7 °C) 94 16 (!) 179/79 97 %      Temp Source Heart Rate Source Patient Position - Orthostatic VS BP Location FiO2 (%)   Temporal Monitor Sitting Left arm --      Pain Score       8           Vitals:    05/15/22 1827 05/15/22 1830   BP: (!) 179/79 162/70   Pulse: 94 92   Patient Position - Orthostatic VS: Sitting          Visual Acuity      ED Medications  Medications - No data to display    Diagnostic Studies  Results Reviewed     None                 XR hand 3+ views RIGHT    (Results Pending)              Procedures  Procedures         ED Course  ED Course as of 05/15/22 1926   Sun May 15, 2022   1828 Pt declines anything for pain  1847 XR hand 3+ views RIGHT  Independently viewed and interpreted by me - no fracture or acute osseous findings, old healed distal deformity of 5th metacarpal; pending official read  1851 Pt updated on results  ACE applied to right hand by me  Pt neurovascularly intact post application  Probable contusion suspected  Reviewed RICE therapy  Continue OTC tylenol and ibuprofen as needed for pain relief  Strict return precautions outlined  Advised outpatient follow up with orthopedics if not improving over the next 1-2 weeks or return to ER for change in condition as outlined  Pt verbalized understanding and had no further questions                                              MDM  Number of Diagnoses or Management Options  Contusion of right hand, initial encounter: new and requires workup     Amount and/or Complexity of Data Reviewed  Tests in the radiology section of CPT®: ordered and reviewed  Decide to obtain previous medical records or to obtain history from someone other than the patient: yes  Obtain history from someone other than the patient: yes  Review and summarize past medical records: yes  Independent visualization of images, tracings, or specimens: yes    Patient Progress  Patient progress: improved      Disposition  Final diagnoses:   Contusion of right hand, initial encounter     Time reflects when diagnosis was documented in both MDM as applicable and the Disposition within this note Time User Action Codes Description Comment    5/15/2022  6:51 PM Evelio Ronquillo [[id number]] Contusion of right hand, initial encounter       ED Disposition     ED Disposition   Discharge    Condition   Stable    Date/Time   Sun May 15, 2022  6:51 PM    Comment   Petros Brochure discharge to home/self care                 Follow-up Information     Follow up With Specialties Details Why Contact Info Additional Information    [facility] Emergency Department Emergency Medicine  As [facility] [id number]  [facility] Department, [address], [address] [place] [address] Specialists Toby Naik Orthopedic Surgery Go to  if symptoms not improving [address] [facility] [address]  [facility] [address] [facility], [place], [place], [address]          Discharge Medication List as of 5/15/2022  6:52 PM      CONTINUE these medications which have NOT CHANGED    Details   ALPRAZolam (XANAX) 0 5 mg tablet Take 1 tablet (0 5 mg total) by mouth 2 (two) times a day as needed for anxiety, Starting Thu 4/28/2022, Until Sat 5/28/2022 at 2359, Normal      aspirin (ECOTRIN) 325 mg EC tablet Take by mouth, Historical Med      b complex vitamins tablet Take 1 tablet by mouth daily, Historical Med      citalopram (CeleXA) 20 mg tablet Take 1 tablet (20 mg total) by mouth daily, Starting Tue 3/1/2022, Normal      ergocalciferol (VITAMIN D2) 50,000 units take 1 capsule by mouth every week, Normal      ezetimibe (ZETIA) 10 mg tablet take 1 tablet by mouth once daily, Normal      metoprolol tartrate (LOPRESSOR) 50 mg tablet Take 1 tablet (50 mg total) by mouth every 12 (twelve) hours, Starting Tue 3/1/2022, Normal      ondansetron (ZOFRAN) 4 mg tablet Take 1 tablet (4 mg total) by mouth every 8 (eight) hours as needed for nausea or vomiting for up to 4 days, Starting Mon 12/20/2021, Until Fri 4/22/2022 at 2359, Normal             No discharge procedures on file      PDMP Review       Value Time User    PDMP Reviewed  Yes 4/28/2022 11:43 AM Carmen Larry DO          ED Provider  Electronically Signed by           Caleb Wick PA-C  05/15/22 1926

## 2022-05-15 NOTE — DISCHARGE INSTRUCTIONS
Rest, ice, compression, elevation  ACE wrap as needed  Continue OTC medications as needed for pain relief  Follow up with PCP and/or orthopedics if symptoms not improving over the next 1-2 weeks  Return to ER as needed

## 2022-06-01 DIAGNOSIS — F41.9 ANXIETY: ICD-10-CM

## 2022-06-01 RX ORDER — ALPRAZOLAM 0.5 MG/1
0.5 TABLET ORAL 2 TIMES DAILY PRN
Qty: 60 TABLET | Refills: 0 | Status: SHIPPED | OUTPATIENT
Start: 2022-06-01 | End: 2022-07-01 | Stop reason: SDUPTHER

## 2022-06-28 ENCOUNTER — TELEPHONE (OUTPATIENT)
Dept: FAMILY MEDICINE CLINIC | Facility: CLINIC | Age: 57
End: 2022-06-28

## 2022-06-28 DIAGNOSIS — J30.1 ALLERGIC RHINITIS DUE TO POLLEN, UNSPECIFIED SEASONALITY: Primary | ICD-10-CM

## 2022-06-28 RX ORDER — CETIRIZINE HYDROCHLORIDE 10 MG/1
10 TABLET ORAL DAILY
Qty: 30 TABLET | Refills: 5 | Status: SHIPPED | OUTPATIENT
Start: 2022-06-28

## 2022-06-28 NOTE — TELEPHONE ENCOUNTER
Patient calling, her  was prescribed Cetrizine HCL 10mg  She started taking his and is getting relief from her allergies of Stuffy nose, watery eyes  Home covid test negative  She is wondering if you can send a prescription for her to [facility] in [place]

## 2022-06-30 ENCOUNTER — TELEPHONE (OUTPATIENT)
Dept: FAMILY MEDICINE CLINIC | Facility: CLINIC | Age: 57
End: 2022-06-30

## 2022-06-30 DIAGNOSIS — J06.9 UPPER RESPIRATORY TRACT INFECTION, UNSPECIFIED TYPE: Primary | ICD-10-CM

## 2022-06-30 RX ORDER — AZITHROMYCIN 250 MG/1
TABLET, FILM COATED ORAL
Qty: 6 TABLET | Refills: 0 | Status: SHIPPED | OUTPATIENT
Start: 2022-06-30 | End: 2022-07-04

## 2022-06-30 RX ORDER — FLUCONAZOLE 150 MG/1
150 TABLET ORAL ONCE
Qty: 1 TABLET | Refills: 0 | Status: SHIPPED | OUTPATIENT
Start: 2022-06-30 | End: 2022-06-30

## 2022-06-30 NOTE — TELEPHONE ENCOUNTER
Pt says both of her ears are blocked and have pain, says she gets this all the time due to her allergies  Asking for a zpack? Just took a home test on 6/28 which was negative  Asking if she could also have diflucan if an antibx is rx  Sandy VIDAL

## 2022-07-01 DIAGNOSIS — F41.9 ANXIETY: ICD-10-CM

## 2022-07-01 RX ORDER — ALPRAZOLAM 0.5 MG/1
0.5 TABLET ORAL 2 TIMES DAILY PRN
Qty: 60 TABLET | Refills: 0 | Status: SHIPPED | OUTPATIENT
Start: 2022-07-01 | End: 2022-08-02 | Stop reason: SDUPTHER

## 2022-07-25 ENCOUNTER — TELEPHONE (OUTPATIENT)
Dept: FAMILY MEDICINE CLINIC | Facility: CLINIC | Age: 57
End: 2022-07-25

## 2022-08-02 DIAGNOSIS — R05.9 COUGH: Primary | ICD-10-CM

## 2022-08-02 DIAGNOSIS — F41.9 ANXIETY: ICD-10-CM

## 2022-08-02 RX ORDER — ALBUTEROL SULFATE 90 UG/1
2 AEROSOL, METERED RESPIRATORY (INHALATION) EVERY 6 HOURS PRN
COMMUNITY
End: 2022-08-02 | Stop reason: SDUPTHER

## 2022-08-02 RX ORDER — MONTELUKAST SODIUM 10 MG/1
10 TABLET ORAL DAILY
Qty: 30 TABLET | Refills: 5 | Status: SHIPPED | OUTPATIENT
Start: 2022-08-02 | End: 2022-09-14 | Stop reason: SDUPTHER

## 2022-08-02 RX ORDER — ALPRAZOLAM 0.5 MG/1
0.5 TABLET ORAL 2 TIMES DAILY PRN
Qty: 60 TABLET | Refills: 0 | Status: SHIPPED | OUTPATIENT
Start: 2022-08-02 | End: 2022-09-01 | Stop reason: SDUPTHER

## 2022-08-02 RX ORDER — ALBUTEROL SULFATE 90 UG/1
2 AEROSOL, METERED RESPIRATORY (INHALATION) EVERY 6 HOURS PRN
Qty: 8 G | Refills: 3 | Status: SHIPPED | OUTPATIENT
Start: 2022-08-02

## 2022-08-02 RX ORDER — MONTELUKAST SODIUM 10 MG/1
10 TABLET ORAL DAILY
COMMUNITY
End: 2022-08-02 | Stop reason: SDUPTHER

## 2022-08-02 NOTE — TELEPHONE ENCOUNTER
Pt also asking what you recommend as she is still brining up yellow mucus and coughing due to postnasal drip  Said she discussed at last visit with you and zyrtec was prescribed, which is providing some relief  Cannot get in with an allergist until 11/15

## 2022-08-05 ENCOUNTER — TELEPHONE (OUTPATIENT)
Dept: FAMILY MEDICINE CLINIC | Facility: CLINIC | Age: 57
End: 2022-08-05

## 2022-08-05 DIAGNOSIS — J06.9 UPPER RESPIRATORY TRACT INFECTION, UNSPECIFIED TYPE: Primary | ICD-10-CM

## 2022-08-05 RX ORDER — AMOXICILLIN 500 MG/1
500 CAPSULE ORAL EVERY 8 HOURS SCHEDULED
Qty: 21 CAPSULE | Refills: 0 | Status: SHIPPED | OUTPATIENT
Start: 2022-08-05 | End: 2022-08-12

## 2022-08-05 RX ORDER — FLUCONAZOLE 150 MG/1
150 TABLET ORAL ONCE
Qty: 1 TABLET | Refills: 0 | Status: SHIPPED | OUTPATIENT
Start: 2022-08-05 | End: 2022-08-05

## 2022-08-05 NOTE — TELEPHONE ENCOUNTER
Pt called Monday for cough with yellow mucus and postnasal drip, was prescribed singulair  Said the rx is not helping, is asking for amoxil as well as diflucan to [facility] in [place]?

## 2022-08-05 NOTE — TELEPHONE ENCOUNTER
Pt aware  Also wanted to let you know that she tested for covid last night and it came back negative

## 2022-08-08 ENCOUNTER — TELEPHONE (OUTPATIENT)
Dept: FAMILY MEDICINE CLINIC | Facility: CLINIC | Age: 57
End: 2022-08-08

## 2022-08-08 NOTE — TELEPHONE ENCOUNTER
Pt on day 4 of amoxil that was prescribed, said she feels better but still having lots of mucus coming out and some sinus pressure  Asking if you think she will need a second round of amoxil since she's almost through with the first one and still having sxs?

## 2022-08-08 NOTE — TELEPHONE ENCOUNTER
Amoxil course whould be at least 7 days so would continue as it seems to be helping - if by Wednesday not improving further may extend it for 10 days course but it sounds like it is helping thus far and would finishe rx first

## 2022-08-09 ENCOUNTER — VBI (OUTPATIENT)
Dept: ADMINISTRATIVE | Facility: OTHER | Age: 57
End: 2022-08-09

## 2022-08-10 ENCOUNTER — TELEPHONE (OUTPATIENT)
Dept: FAMILY MEDICINE CLINIC | Facility: CLINIC | Age: 57
End: 2022-08-10

## 2022-08-10 DIAGNOSIS — J06.9 UPPER RESPIRATORY TRACT INFECTION, UNSPECIFIED TYPE: Primary | ICD-10-CM

## 2022-08-10 RX ORDER — AMOXICILLIN 500 MG/1
500 CAPSULE ORAL EVERY 8 HOURS SCHEDULED
Qty: 9 CAPSULE | Refills: 0 | Status: SHIPPED | OUTPATIENT
Start: 2022-08-10 | End: 2022-08-13

## 2022-08-16 ENCOUNTER — TELEPHONE (OUTPATIENT)
Dept: FAMILY MEDICINE CLINIC | Facility: CLINIC | Age: 57
End: 2022-08-16

## 2022-08-16 DIAGNOSIS — J06.9 UPPER RESPIRATORY TRACT INFECTION, UNSPECIFIED TYPE: Primary | ICD-10-CM

## 2022-08-16 RX ORDER — PREDNISONE 10 MG/1
10 TABLET ORAL DAILY
Qty: 5 TABLET | Refills: 0 | Status: SHIPPED | OUTPATIENT
Start: 2022-08-16 | End: 2022-08-21

## 2022-08-16 NOTE — TELEPHONE ENCOUNTER
Pt finished 10 day course of amoxil, still has congestion, cough with mucus  Tried mucinex and NyQuil/ DayQuil  Wants to know what you recommend now?

## 2022-08-28 DIAGNOSIS — E78.2 MIXED HYPERLIPIDEMIA: ICD-10-CM

## 2022-08-29 RX ORDER — EZETIMIBE 10 MG/1
TABLET ORAL
Qty: 30 TABLET | Refills: 5 | Status: SHIPPED | OUTPATIENT
Start: 2022-08-29

## 2022-09-01 ENCOUNTER — HOSPITAL ENCOUNTER (OUTPATIENT)
Dept: ULTRASOUND IMAGING | Facility: HOSPITAL | Age: 57
Discharge: HOME | End: 2022-09-01
Attending: INTERNAL MEDICINE
Payer: COMMERCIAL

## 2022-09-01 ENCOUNTER — HOSPITAL ENCOUNTER (OUTPATIENT)
Dept: MAMMOGRAPHY | Facility: HOSPITAL | Age: 57
Discharge: HOME | End: 2022-09-01
Attending: INTERNAL MEDICINE
Payer: COMMERCIAL

## 2022-09-01 VITALS — WEIGHT: 200 LBS | HEIGHT: 63 IN | BODY MASS INDEX: 35.44 KG/M2

## 2022-09-01 DIAGNOSIS — R92.8 ABNORMAL MAMMOGRAM: ICD-10-CM

## 2022-09-01 DIAGNOSIS — F41.9 ANXIETY: ICD-10-CM

## 2022-09-01 PROCEDURE — 76642 ULTRASOUND BREAST LIMITED: CPT

## 2022-09-01 PROCEDURE — G0279 TOMOSYNTHESIS, MAMMO: HCPCS

## 2022-09-01 PROCEDURE — 77066 DX MAMMO INCL CAD BI: CPT

## 2022-09-01 RX ORDER — ALPRAZOLAM 0.5 MG/1
0.5 TABLET ORAL 2 TIMES DAILY PRN
Qty: 60 TABLET | Refills: 0 | Status: SHIPPED | OUTPATIENT
Start: 2022-09-01 | End: 2022-10-03 | Stop reason: SDUPTHER

## 2022-09-02 ENCOUNTER — TELEPHONE (OUTPATIENT)
Dept: FAMILY MEDICINE CLINIC | Facility: CLINIC | Age: 57
End: 2022-09-02

## 2022-09-02 DIAGNOSIS — J01.10 SUBACUTE FRONTAL SINUSITIS: Primary | ICD-10-CM

## 2022-09-02 RX ORDER — LEVOFLOXACIN 500 MG/1
500 TABLET, FILM COATED ORAL EVERY 24 HOURS
Qty: 10 TABLET | Refills: 0 | Status: SHIPPED | OUTPATIENT
Start: 2022-09-02 | End: 2022-09-12

## 2022-09-02 NOTE — TELEPHONE ENCOUNTER
Pt states still sick with congestion,chills, sore throat from post nasal drip, yellow mucus out of nose, not spitting any mucus up  Sxs restarted yesterday last medication we prescribed did seem to help but once pt finished she started back up with these sxs   If to prescribed anything would like sent to [facility] in [facility]

## 2022-09-08 ENCOUNTER — HOSPITAL ENCOUNTER (OUTPATIENT)
Dept: CT IMAGING | Facility: HOSPITAL | Age: 57
Discharge: HOME | End: 2022-09-08
Attending: INTERNAL MEDICINE
Payer: COMMERCIAL

## 2022-09-08 DIAGNOSIS — J01.10 SUBACUTE FRONTAL SINUSITIS: ICD-10-CM

## 2022-09-08 PROCEDURE — 70486 CT MAXILLOFACIAL W/O DYE: CPT

## 2022-09-08 PROCEDURE — G1004 CDSM NDSC: HCPCS

## 2022-09-09 DIAGNOSIS — K11.8 PAROTID NODULE: Primary | ICD-10-CM

## 2022-09-14 ENCOUNTER — TELEPHONE (OUTPATIENT)
Dept: FAMILY MEDICINE CLINIC | Facility: CLINIC | Age: 57
End: 2022-09-14

## 2022-09-14 ENCOUNTER — TELEPHONE (OUTPATIENT)
Dept: OTOLARYNGOLOGY | Facility: CLINIC | Age: 57
End: 2022-09-14

## 2022-09-14 DIAGNOSIS — R05.9 COUGH: ICD-10-CM

## 2022-09-14 RX ORDER — MONTELUKAST SODIUM 10 MG/1
10 TABLET ORAL DAILY
Qty: 30 TABLET | Refills: 5 | Status: SHIPPED | OUTPATIENT
Start: 2022-09-14

## 2022-09-14 NOTE — TELEPHONE ENCOUNTER
Pt can't see ENT until 10/28, was told to contact PCP about being on an antibx until she can get in with them, but pt said she doesn't want to be on antibx for over a month  Please advise

## 2022-09-14 NOTE — TELEPHONE ENCOUNTER
She can try sibgulair 10mg daily - it is not an antibiotic but may help with allergic/sinus sxs until seen

## 2022-09-14 NOTE — TELEPHONE ENCOUNTER
I called and spoke with the patient and scheduled her for 10/28 1pm       ----- Message from Renetta Tyler MD sent at 9/14/2022 11:14 AM EDT -----  Regarding: RE: parotid nodule  A month     ----- Message -----  From: Lupis Todd  Sent: 9/14/2022  11:08 AM EDT  To: Renetta Tyler MD  Subject: parotid nodule                                   Received a referral from pcp office for parotid nodule      Alexander Blackburn

## 2022-09-30 DIAGNOSIS — J01.10 SUBACUTE FRONTAL SINUSITIS: Primary | ICD-10-CM

## 2022-10-03 DIAGNOSIS — F41.9 ANXIETY: ICD-10-CM

## 2022-10-03 RX ORDER — ALPRAZOLAM 0.5 MG/1
0.5 TABLET ORAL 2 TIMES DAILY PRN
Qty: 60 TABLET | Refills: 0 | Status: SHIPPED | OUTPATIENT
Start: 2022-10-03 | End: 2022-11-02

## 2022-10-05 ENCOUNTER — HOSPITAL ENCOUNTER (OUTPATIENT)
Dept: NON INVASIVE DIAGNOSTICS | Facility: HOSPITAL | Age: 57
Discharge: HOME | End: 2022-10-05
Payer: COMMERCIAL

## 2022-10-05 DIAGNOSIS — Z01.818 PREOP EXAMINATION: ICD-10-CM

## 2022-10-05 LAB
ATRIAL RATE: 67 BPM
P AXIS: 67 DEGREES
PR INTERVAL: 156 MS
QRS AXIS: 62 DEGREES
QRSD INTERVAL: 78 MS
QT INTERVAL: 426 MS
QTC INTERVAL: 450 MS
T WAVE AXIS: 80 DEGREES
VENTRICULAR RATE: 67 BPM

## 2022-10-05 PROCEDURE — 93005 ELECTROCARDIOGRAM TRACING: CPT

## 2022-10-05 PROCEDURE — 93010 ELECTROCARDIOGRAM REPORT: CPT | Performed by: INTERNAL MEDICINE

## 2022-10-07 ENCOUNTER — TELEPHONE (OUTPATIENT)
Dept: OTOLARYNGOLOGY | Facility: CLINIC | Age: 57
End: 2022-10-07

## 2022-10-07 NOTE — TELEPHONE ENCOUNTER
Spoke to Rock about her appointment, she states that this should be canceled  She went somewhere else

## 2022-10-24 ENCOUNTER — CONSULT (OUTPATIENT)
Dept: FAMILY MEDICINE CLINIC | Facility: CLINIC | Age: 57
End: 2022-10-24
Payer: COMMERCIAL

## 2022-10-24 VITALS
SYSTOLIC BLOOD PRESSURE: 124 MMHG | BODY MASS INDEX: 34.76 KG/M2 | TEMPERATURE: 97.3 F | DIASTOLIC BLOOD PRESSURE: 78 MMHG | RESPIRATION RATE: 18 BRPM | HEIGHT: 63 IN | HEART RATE: 76 BPM | WEIGHT: 196.2 LBS

## 2022-10-24 DIAGNOSIS — J32.4 CHRONIC PANSINUSITIS: Primary | ICD-10-CM

## 2022-10-24 DIAGNOSIS — I10 PRIMARY HYPERTENSION: ICD-10-CM

## 2022-10-24 DIAGNOSIS — J43.9 PULMONARY EMPHYSEMA, UNSPECIFIED EMPHYSEMA TYPE (HCC): ICD-10-CM

## 2022-10-24 PROCEDURE — 99214 OFFICE O/P EST MOD 30 MIN: CPT | Performed by: INTERNAL MEDICINE

## 2022-10-24 RX ORDER — FEXOFENADINE HCL 180 MG/1
TABLET ORAL
COMMUNITY
End: 2022-10-31

## 2022-10-24 RX ORDER — AZELASTINE HYDROCHLORIDE 0.5 MG/ML
1 SOLUTION/ DROPS OPHTHALMIC DAILY
COMMUNITY

## 2022-10-24 RX ORDER — FLUTICASONE PROPIONATE 50 MCG
2 SPRAY, SUSPENSION (ML) NASAL DAILY
COMMUNITY
End: 2022-10-31

## 2022-10-24 RX ORDER — AZELASTINE HYDROCHLORIDE 0.5 MG/ML
SOLUTION/ DROPS OPHTHALMIC
COMMUNITY
Start: 2022-08-13 | End: 2022-10-31

## 2022-10-24 NOTE — PROGRESS NOTES
Name: Vane Schmidt      : 1965      MRN: [id number]  Encounter Provider: Mauro Holden DO  Encounter Date: 10/24/2022   Encounter department: [address]     1  Chronic pansinusitis  Pt cleared for sinus and tube placement surgery   She has transport to the hospital   No asa or nsaids   Stop smoking encouraged but pt challenged to do so currently   PATs reviewed/stable     2  Pulmonary emphysema, unspecified emphysema type ([address] )  Stable on current Rx       3  Primary hypertension  Continue current rx Stay hydrated   No added salt diet     Rto 3months     Depression Screening and Follow-up Plan: Patient was screened for depression during today's encounter  They screened negative with a PHQ-2 score of 0  Tobacco Cessation Counseling: Tobacco cessation counseling was provided  The patient is sincerely urged to quit consumption of tobacco  She is ready to quit tobacco  Medication options not discussed  Subjective      HPI   Pt having sinus and tube placement for ongoing sinus issues and recurrent infections She has had headaches and sinus pressure Still smoking as challenged to quit No sob or chest pain Dr Rosa Sanchez did remove some of the mucous and has some relief but still daily sxs Occasional cough No fever has had hearing decrease and will have tubes placed No chest pain   Review of Systems   Constitutional: Negative for chills and fever  HENT: Positive for congestion, ear pain, sinus pressure and sinus pain  Respiratory: Negative for cough and shortness of breath  Cardiovascular: Negative for chest pain, palpitations and leg swelling  Gastrointestinal: Negative for abdominal distention and abdominal pain  Genitourinary: Positive for frequency  Neurological: Positive for headaches  Negative for dizziness and light-headedness  Psychiatric/Behavioral: Negative for sleep disturbance  The patient is not nervous/anxious          Current Outpatient Medications on File Prior to Visit   Medication Sig   • albuterol (PROVENTIL HFA,VENTOLIN HFA) 90 mcg/act inhaler Inhale 2 puffs every 6 (six) hours as needed for wheezing   • ALPRAZolam (XANAX) 0 5 mg tablet Take 1 tablet (0 5 mg total) by mouth 2 (two) times a day as needed for anxiety   • Aspirin 81 MG CAPS Take by mouth   • azelastine (OPTIVAR) 0 05 % ophthalmic solution Apply to eye   • azelastine (OPTIVAR) 0 05 % ophthalmic solution instill 1 drop EVERY DAY INTO AFFECTED EYE(S)   • b complex vitamins tablet Take 1 tablet by mouth daily   • citalopram (CeleXA) 20 mg tablet Take 1 tablet (20 mg total) by mouth daily   • ergocalciferol (VITAMIN D2) 50,000 units take 1 capsule by mouth every week   • ezetimibe (ZETIA) 10 mg tablet take 1 tablet by mouth once daily   • fexofenadine (ALLEGRA) 180 MG tablet Take by mouth   • Fexofenadine-Pseudoephedrine (ALLEGRA-D 12 HOUR PO) Take by mouth   • fluticasone (FLONASE) 50 mcg/act nasal spray into each nostril   • metoprolol tartrate (LOPRESSOR) 50 mg tablet Take 1 tablet (50 mg total) by mouth every 12 (twelve) hours   • montelukast (SINGULAIR) 10 mg tablet Take 1 tablet (10 mg total) by mouth daily   • ondansetron (ZOFRAN) 4 mg tablet Take 1 tablet (4 mg total) by mouth every 8 (eight) hours as needed for nausea or vomiting for up to 4 days   • aspirin (ECOTRIN) 325 mg EC tablet Take by mouth (Patient not taking: Reported on 10/24/2022)   • cetirizine (ZyrTEC) 10 mg tablet Take 1 tablet (10 mg total) by mouth daily (Patient not taking: Reported on 10/24/2022)       Objective     /78   Pulse 76   Temp (!) 97 3 °F (36 3 °C) (Temporal)   Resp 18   Ht 5' 3" (1 6 m)   Wt 89 kg (196 lb 3 2 oz)   BMI 34 76 kg/m²     Physical Exam  Vitals reviewed  Constitutional:       General: She is not in acute distress  Appearance: Normal appearance  She is not ill-appearing, toxic-appearing or diaphoretic  HENT:      Head: Normocephalic and atraumatic        Right Ear: External ear normal       Left Ear: External ear normal       Nose: Nose normal       Mouth/Throat:      Mouth: Mucous membranes are dry  Eyes:      General: No scleral icterus  Extraocular Movements: Extraocular movements intact  Conjunctiva/sclera: Conjunctivae normal       Pupils: Pupils are equal, round, and reactive to light  Cardiovascular:      Rate and Rhythm: Normal rate and regular rhythm  Pulses: Normal pulses  Pulmonary:      Effort: Pulmonary effort is normal  No respiratory distress  Breath sounds: Normal breath sounds  No wheezing  Abdominal:      General: Bowel sounds are normal       Palpations: Abdomen is soft  Musculoskeletal:      Cervical back: Neck supple  Right lower leg: No edema  Left lower leg: No edema  Lymphadenopathy:      Cervical: No cervical adenopathy  Skin:     General: Skin is dry  Coloration: Skin is not jaundiced or pale  Findings: No erythema  Neurological:      General: No focal deficit present  Mental Status: She is alert and oriented to person, place, and time  Cranial Nerves: No cranial nerve deficit  Psychiatric:         Mood and Affect: Mood normal          Behavior: Behavior normal          Thought Content:  Thought content normal          Judgment: Judgment normal        Zora Razo DO

## 2022-10-25 RX ORDER — COVID-19 ANTIGEN TEST
2 KIT MISCELLANEOUS AS NEEDED
COMMUNITY

## 2022-10-25 NOTE — PRE-PROCEDURE INSTRUCTIONS
Pre-Surgery Instructions:   Medication Instructions   • albuterol (PROVENTIL HFA,VENTOLIN HFA) 90 mcg/act inhaler Uses PRN- OK to take day of surgery   • ALPRAZolam (XANAX) 0 5 mg tablet Uses PRN- OK to take day of surgery   • Aspirin 81 MG CAPS Stop taking 7 days prior to surgery  • azelastine (OPTIVAR) 0 05 % ophthalmic solution Uses PRN- OK to take day of surgery   • citalopram (CeleXA) 20 mg tablet Take night before surgery   • ergocalciferol (VITAMIN D2) 50,000 units Pt takes weekly on a Friday   • ezetimibe (ZETIA) 10 mg tablet Take night before surgery   • Fexofenadine-Pseudoephedrine (ALLEGRA-D 12 HOUR PO) Hold day of surgery  • fluticasone (FLONASE) 50 mcg/act nasal spray Take day of surgery  • metoprolol tartrate (LOPRESSOR) 50 mg tablet Take day of surgery  • montelukast (SINGULAIR) 10 mg tablet Take night before surgery   • Naproxen Sodium (Aleve) 220 MG CAPS Stop taking 7 days prior to surgery  See above    Pt instructed to take the am meds with a small sip of water

## 2022-10-30 ENCOUNTER — ANESTHESIA EVENT (OUTPATIENT)
Dept: PERIOP | Facility: HOSPITAL | Age: 57
End: 2022-10-30

## 2022-10-31 ENCOUNTER — HOSPITAL ENCOUNTER (OUTPATIENT)
Facility: HOSPITAL | Age: 57
Setting detail: OUTPATIENT SURGERY
Discharge: HOME | End: 2022-10-31
Attending: OTOLARYNGOLOGY | Admitting: OTOLARYNGOLOGY

## 2022-10-31 ENCOUNTER — ANESTHESIA (OUTPATIENT)
Dept: PERIOP | Facility: HOSPITAL | Age: 57
End: 2022-10-31

## 2022-10-31 VITALS
OXYGEN SATURATION: 92 % | HEART RATE: 69 BPM | BODY MASS INDEX: 34.73 KG/M2 | WEIGHT: 196 LBS | SYSTOLIC BLOOD PRESSURE: 129 MMHG | DIASTOLIC BLOOD PRESSURE: 70 MMHG | HEIGHT: 63 IN | RESPIRATION RATE: 20 BRPM | TEMPERATURE: 97.9 F

## 2022-10-31 DIAGNOSIS — H65.21 CHRONIC SEROUS OTITIS MEDIA, RIGHT EAR: ICD-10-CM

## 2022-10-31 DIAGNOSIS — J34.2 DEVIATED NASAL SEPTUM: ICD-10-CM

## 2022-10-31 DIAGNOSIS — J34.3 HYPERTROPHY OF NASAL TURBINATES: ICD-10-CM

## 2022-10-31 DEVICE — ARMSTRONG BEVELED VENT TUBE GROMMET TYPE 1.14 MM I.D. FLUOROPLASTIC
Type: IMPLANTABLE DEVICE | Site: EAR | Status: FUNCTIONAL
Brand: GYRUS ACMI

## 2022-10-31 RX ORDER — PROPOFOL 10 MG/ML
INJECTION, EMULSION INTRAVENOUS AS NEEDED
Status: DISCONTINUED | OUTPATIENT
Start: 2022-10-31 | End: 2022-10-31

## 2022-10-31 RX ORDER — FENTANYL CITRATE 50 UG/ML
INJECTION, SOLUTION INTRAMUSCULAR; INTRAVENOUS AS NEEDED
Status: DISCONTINUED | OUTPATIENT
Start: 2022-10-31 | End: 2022-10-31

## 2022-10-31 RX ORDER — HYDROCODONE BITARTRATE AND ACETAMINOPHEN 5; 325 MG/1; MG/1
2 TABLET ORAL EVERY 6 HOURS PRN
Status: DISCONTINUED | OUTPATIENT
Start: 2022-10-31 | End: 2022-10-31 | Stop reason: HOSPADM

## 2022-10-31 RX ORDER — DEXMEDETOMIDINE HYDROCHLORIDE 100 UG/ML
INJECTION, SOLUTION INTRAVENOUS AS NEEDED
Status: DISCONTINUED | OUTPATIENT
Start: 2022-10-31 | End: 2022-10-31

## 2022-10-31 RX ORDER — ONDANSETRON 2 MG/ML
4 INJECTION INTRAMUSCULAR; INTRAVENOUS ONCE AS NEEDED
Status: DISCONTINUED | OUTPATIENT
Start: 2022-10-31 | End: 2022-10-31 | Stop reason: HOSPADM

## 2022-10-31 RX ORDER — ROCURONIUM BROMIDE 10 MG/ML
INJECTION, SOLUTION INTRAVENOUS AS NEEDED
Status: DISCONTINUED | OUTPATIENT
Start: 2022-10-31 | End: 2022-10-31

## 2022-10-31 RX ORDER — DEXAMETHASONE SODIUM PHOSPHATE 10 MG/ML
INJECTION, SOLUTION INTRAMUSCULAR; INTRAVENOUS AS NEEDED
Status: DISCONTINUED | OUTPATIENT
Start: 2022-10-31 | End: 2022-10-31

## 2022-10-31 RX ORDER — EPHEDRINE SULFATE 50 MG/ML
INJECTION INTRAVENOUS AS NEEDED
Status: DISCONTINUED | OUTPATIENT
Start: 2022-10-31 | End: 2022-10-31

## 2022-10-31 RX ORDER — COCAINE HYDROCHLORIDE 40 MG/ML
SOLUTION NASAL AS NEEDED
Status: DISCONTINUED | OUTPATIENT
Start: 2022-10-31 | End: 2022-10-31 | Stop reason: HOSPADM

## 2022-10-31 RX ORDER — HYDROCODONE BITARTRATE AND ACETAMINOPHEN 5; 325 MG/1; MG/1
2 TABLET ORAL EVERY 6 HOURS PRN
Qty: 30 TABLET | Refills: 0 | Status: SHIPPED | OUTPATIENT
Start: 2022-10-31 | End: 2022-11-10

## 2022-10-31 RX ORDER — GINSENG 100 MG
CAPSULE ORAL AS NEEDED
Status: DISCONTINUED | OUTPATIENT
Start: 2022-10-31 | End: 2022-10-31 | Stop reason: HOSPADM

## 2022-10-31 RX ORDER — SODIUM CHLORIDE, SODIUM LACTATE, POTASSIUM CHLORIDE, CALCIUM CHLORIDE 600; 310; 30; 20 MG/100ML; MG/100ML; MG/100ML; MG/100ML
50 INJECTION, SOLUTION INTRAVENOUS CONTINUOUS
Status: DISCONTINUED | OUTPATIENT
Start: 2022-10-31 | End: 2022-10-31 | Stop reason: HOSPADM

## 2022-10-31 RX ORDER — DIPHENHYDRAMINE HYDROCHLORIDE 50 MG/ML
12.5 INJECTION INTRAMUSCULAR; INTRAVENOUS ONCE AS NEEDED
Status: DISCONTINUED | OUTPATIENT
Start: 2022-10-31 | End: 2022-10-31 | Stop reason: HOSPADM

## 2022-10-31 RX ORDER — ONDANSETRON 2 MG/ML
INJECTION INTRAMUSCULAR; INTRAVENOUS AS NEEDED
Status: DISCONTINUED | OUTPATIENT
Start: 2022-10-31 | End: 2022-10-31

## 2022-10-31 RX ORDER — HYDROMORPHONE HCL/PF 1 MG/ML
0.5 SYRINGE (ML) INJECTION
Status: DISCONTINUED | OUTPATIENT
Start: 2022-10-31 | End: 2022-10-31 | Stop reason: HOSPADM

## 2022-10-31 RX ORDER — LIDOCAINE HYDROCHLORIDE AND EPINEPHRINE 20; 5 MG/ML; UG/ML
INJECTION, SOLUTION EPIDURAL; INFILTRATION; INTRACAUDAL; PERINEURAL AS NEEDED
Status: DISCONTINUED | OUTPATIENT
Start: 2022-10-31 | End: 2022-10-31 | Stop reason: HOSPADM

## 2022-10-31 RX ORDER — SODIUM CHLORIDE/ALOE VERA
GEL (GRAM) NASAL AS NEEDED
Status: DISCONTINUED | OUTPATIENT
Start: 2022-10-31 | End: 2022-10-31 | Stop reason: HOSPADM

## 2022-10-31 RX ORDER — FENTANYL CITRATE/PF 50 MCG/ML
25 SYRINGE (ML) INJECTION
Status: DISCONTINUED | OUTPATIENT
Start: 2022-10-31 | End: 2022-10-31 | Stop reason: HOSPADM

## 2022-10-31 RX ORDER — ALBUTEROL SULFATE 90 UG/1
AEROSOL, METERED RESPIRATORY (INHALATION) AS NEEDED
Status: DISCONTINUED | OUTPATIENT
Start: 2022-10-31 | End: 2022-10-31

## 2022-10-31 RX ORDER — LIDOCAINE HYDROCHLORIDE 10 MG/ML
INJECTION, SOLUTION EPIDURAL; INFILTRATION; INTRACAUDAL; PERINEURAL AS NEEDED
Status: DISCONTINUED | OUTPATIENT
Start: 2022-10-31 | End: 2022-10-31

## 2022-10-31 RX ORDER — CEFAZOLIN SODIUM 2 G/50ML
2000 SOLUTION INTRAVENOUS ONCE
Status: COMPLETED | OUTPATIENT
Start: 2022-10-31 | End: 2022-10-31

## 2022-10-31 RX ORDER — MIDAZOLAM HYDROCHLORIDE 2 MG/2ML
INJECTION, SOLUTION INTRAMUSCULAR; INTRAVENOUS AS NEEDED
Status: DISCONTINUED | OUTPATIENT
Start: 2022-10-31 | End: 2022-10-31

## 2022-10-31 RX ORDER — ALBUTEROL SULFATE 2.5 MG/3ML
2.5 SOLUTION RESPIRATORY (INHALATION) ONCE AS NEEDED
Status: DISCONTINUED | OUTPATIENT
Start: 2022-10-31 | End: 2022-10-31 | Stop reason: HOSPADM

## 2022-10-31 RX ORDER — CEPHALEXIN 500 MG/1
500 CAPSULE ORAL EVERY 12 HOURS SCHEDULED
Qty: 20 CAPSULE | Refills: 0 | Status: SHIPPED | OUTPATIENT
Start: 2022-10-31 | End: 2022-11-10

## 2022-10-31 RX ORDER — OXYMETAZOLINE HYDROCHLORIDE 0.05 G/100ML
SPRAY NASAL AS NEEDED
Status: DISCONTINUED | OUTPATIENT
Start: 2022-10-31 | End: 2022-10-31 | Stop reason: HOSPADM

## 2022-10-31 RX ADMIN — ALBUTEROL SULFATE 6 PUFF: 90 AEROSOL, METERED RESPIRATORY (INHALATION) at 10:58

## 2022-10-31 RX ADMIN — SUGAMMADEX 400 MG: 100 INJECTION, SOLUTION INTRAVENOUS at 10:46

## 2022-10-31 RX ADMIN — DEXAMETHASONE SODIUM PHOSPHATE 10 MG: 10 INJECTION, SOLUTION INTRAMUSCULAR; INTRAVENOUS at 10:06

## 2022-10-31 RX ADMIN — MIDAZOLAM 2 MG: 1 INJECTION INTRAMUSCULAR; INTRAVENOUS at 09:55

## 2022-10-31 RX ADMIN — CEFAZOLIN SODIUM 2000 MG: 2 SOLUTION INTRAVENOUS at 10:00

## 2022-10-31 RX ADMIN — LIDOCAINE HYDROCHLORIDE 50 MG: 10 INJECTION, SOLUTION EPIDURAL; INFILTRATION; INTRACAUDAL; PERINEURAL at 10:02

## 2022-10-31 RX ADMIN — DEXMEDETOMIDINE HYDROCHLORIDE 8 MCG: 100 INJECTION, SOLUTION INTRAVENOUS at 10:14

## 2022-10-31 RX ADMIN — PROPOFOL 170 MG: 10 INJECTION, EMULSION INTRAVENOUS at 10:02

## 2022-10-31 RX ADMIN — DEXMEDETOMIDINE HYDROCHLORIDE 8 MCG: 100 INJECTION, SOLUTION INTRAVENOUS at 10:16

## 2022-10-31 RX ADMIN — ROCURONIUM BROMIDE 50 MG: 10 INJECTION, SOLUTION INTRAVENOUS at 10:02

## 2022-10-31 RX ADMIN — FENTANYL CITRATE 50 MCG: 50 INJECTION INTRAMUSCULAR; INTRAVENOUS at 10:19

## 2022-10-31 RX ADMIN — ONDANSETRON 4 MG: 2 INJECTION INTRAMUSCULAR; INTRAVENOUS at 10:44

## 2022-10-31 RX ADMIN — EPHEDRINE SULFATE 10 MG: 50 INJECTION, SOLUTION INTRAVENOUS at 10:35

## 2022-10-31 RX ADMIN — EPHEDRINE SULFATE 10 MG: 50 INJECTION, SOLUTION INTRAVENOUS at 10:30

## 2022-10-31 RX ADMIN — FENTANYL CITRATE 50 MCG: 50 INJECTION INTRAMUSCULAR; INTRAVENOUS at 10:00

## 2022-10-31 RX ADMIN — SODIUM CHLORIDE, SODIUM LACTATE, POTASSIUM CHLORIDE, AND CALCIUM CHLORIDE: .6; .31; .03; .02 INJECTION, SOLUTION INTRAVENOUS at 09:55

## 2022-10-31 NOTE — OP NOTE
OPERATIVE REPORT  PATIENT NAME: Destiny Ignacio    :  1965  MRN: [id number]  Pt Location: [facility] OR ROOM 02    SURGERY DATE: 10/31/2022    Surgeon(s) and Role:     * Brittney Mchugh MD - Primary    Preop Diagnosis:  Chronic serous otitis media, right ear [H65 21]  Deviated nasal septum [J34 2]  Hypertrophy of nasal turbinates [J34 3]    Post-Op Diagnosis Codes:     * Chronic serous otitis media, right ear [H65 21]     * Deviated nasal septum [J34 2]     * Hypertrophy of nasal turbinates [J34 3]    Procedure(s) (LRB):  MYRINGOTOMY WITH TUBES (Bilateral)  SEPTOPLASTY, BILATERAL INFERIOR TURBINATE COBLATION (N/A)  EXCISION OF LEFT NICOLE BULLOSA, BILATERAL MIDDLE MEATUS ANTROSTOMIES, BILATERAL ANTERIOR ETHMOIDECTOMIES WITH FUSION (N/A)  ADENOIDECTOMY (N/A)    Specimen(s):  * No specimens in log *    Estimated Blood Loss:   Minimal    Drains:  * No LDAs found *    Anesthesia Type:   General    Operative Indications:  Chronic serous otitis media, right ear [H65 21]  Deviated nasal septum [J34 2]  Hypertrophy of nasal turbinates [J34 3]      Operative Findings:      Complications:   None    Procedure and Technique:  The patient was identified and taken to the operative suite  A timeout was called  After the successful induction of general anesthesia and endotracheal intubation, the patient was prepped and draped in usual fashion  A 4-0 speculum was inserted into the right external auditory canal and microscope was placed into position  Under microscopic visualization, cerumen was debrided with a cerumen curette  Using microscopic visualization, an anterior, inferior radial incision was made in the tympanic membrane and a serous effusion was suctioned with a #5 suction  The myringotomy tube was placed  The exact same findings and procedure were performed on the left ear as described on the right  The table was turned and a shoulder roll was placed    A McIvor mouth gag was inserted into the mouth and red rubber catheters were threaded through the nose and out the oral cavity for palatal retraction  No submucous cleft was identified  Using the dental mirror, the adenoids were found to be partially obstructing the choana and they were vaporized with the Bovie electrocautery on a setting of 30  No further bleeding was seen  The mouth gag and retractors were relaxed to minutes and the area was reinspected and free of bleeding  The mouth gag and retractors were removed and an oral airway was placed  Afrin was sprayed into the nose bilaterally and 2% lidocaine, 1-200,000 epinephrine was injected into the septum and inferior turbinates  Cocaine pledgets were then placed  After waiting 2 minutes, a left hemitransfixion incision was made with a Rayville blade and a mucoperichondrial flap was raised on the left side, using the Derek knife and iris scissors  An intercartilaginous incision was made, again with a Derek knife, and a mucoperichondrial flap was raised on the opposite side  The bony and cartilaginous defect was identified and removed with the Regan scissors and swivel knife and a portion of the cartilage was morselized and placed back into the pocket  Care was taken to preserve the dorsal and caudal struts  The hemitransfixion was then closed with 4-0 chromic in an interrupted fashion and 4-0 plain gut through and through as a quilting stitch for the mucoperichondrium  Next, the inferior turbinates were outfractured with a Bustillo bar and the Coblation was used bilaterally, developing 2 submucosal tunnels with the current on the setting of 6 to achieve submucosal reduction of the turbinates without bleeding  Next, the fusion navigation system was placed and calibrated and found to be functioning normally by attaining all landmarks  The lateral nasal wall was addressed on each side and injected with 1% lidocaine, 1-100,000 epinephrine    The cocaine pledgets were placed into the middle meatus bilaterally  The left side was addressed first and the 0 degree endoscope was brought into the field  The theodore bullosa was taken with the straight forceps and delivered into the field  There was no bleeding  Using a Kendall knife, the middle turbinate was medialized and the uncinate process freed and palpated with the Gadiel probe  The natural os of the maxillary sinus was identified  Using the Kendall knife, the uncinate was taken down and the straight forceps used to remove it  The natural os of the maxillary sinus was identified and widened with the microdebrider  This facilitated exposure of the ethmoid bulla, which was then entered and the sinus disease removed in a piecemeal fashion with combination of straight and up-biting forceps as well as microdebrider technique  Attention was paid to the skull base and the Lamina Paprycea, which were identified and spared  Dissection over the ground lamella saw that there was no significant posterior ethmoid disease     Afrin pledgets were placed into the ethmoid and maxillary sinuses and attention was directed to the opposite side the anterior ethmoidectomy, middle meatal antrostomy were performed exactly on the right side as described on the left  There was, however, significant fungal disease in the right maxillary sinus which was removed with the suction and irrigation  Afrin pledgets were then again placed  After waiting a period of 2 minutes, the pledgets were removed and the area was again reinspected with the 0 degree endoscope  No further bleeding was identified  Mack splints were placed on either side of the septum and affixed with 2-0 silk suture  The throat was suctioned and an oral airway was placed  The patient was awakened and extubated without incident and taken to the PACU in excellent condition  Instrument and sponge counts were correct x2 at the end of the case  The patient was taken to the PACU in excellent condition  Instrument and sponge counts were correct x 2 at the end of the case      Patient Disposition:  PACU         SIGNATURE: Dylan Zaidi MD  DATE: October 31, 2022  TIME: 9:31 AM

## 2022-10-31 NOTE — ANESTHESIA PREPROCEDURE EVALUATION
Procedure:  MYRINGOTOMY WITH TUBES (Bilateral Ear)  SEPTOPLASTY, BILATERAL INFERIOR TURBINATE COBLATION (N/A Nose)  EXCISION OF LEFT NICOLE BULLOSA, BILATERAL MIDDLE MEATUS ANTROSTOMIES, BILATERAL ANTERIOR ETHMOIDECTOMIES WITH FUSION (N/A Nose)  ADENOIDECTOMY (N/A Throat)    Relevant Problems   CARDIO   (+) Hyperlipidemia   (+) Hypertension      GI/HEPATIC   (+) Esophageal reflux      MUSCULOSKELETAL   (+) Diastasis recti      NEURO/PSYCH   (+) Anxiety      PULMONARY   (+) Chronic obstructive airway disease (HCC)        Physical Exam    Airway    Mallampati score: II  TM Distance: >3 FB  Neck ROM: full     Dental   No notable dental hx     Cardiovascular      Pulmonary      Other Findings        Anesthesia Plan  ASA Score- 2     Anesthesia Type- general with ASA Monitors  Additional Monitors:   Airway Plan: ETT  Plan Factors-Exercise tolerance (METS): >4 METS  Chart reviewed  EKG reviewed  Existing labs reviewed  Patient summary reviewed  Patient is not a current smoker  Induction- intravenous  Postoperative Plan- Plan for postoperative opioid use  Planned trial extubation    Informed Consent- Anesthetic plan and risks discussed with patient  I personally reviewed this patient with the CRNA  Discussed and agreed on the Anesthesia Plan with the CRNA  Andrés Vela

## 2022-10-31 NOTE — INTERVAL H&P NOTE
H&P reviewed  After examining the patient I find no changes in the patients condition since the H&P had been written      Vitals:    10/31/22 0753   BP: 149/74   Pulse: 62   Resp: 20   Temp: 98 3 °F (36 8 °C)   SpO2: 95%

## 2022-10-31 NOTE — DISCHARGE INSTRUCTIONS
[facility]   [address] [facility], [address] [place] [address] [place]   PHONE: [phone] FAX:  [phone]  EMAIL: [email or web address]  [address] POST OP PATIENT INSTRUCTIONS FOLLOWING NASAL SURGERY       1  DISCOMFORT AND SWELLING    Your doctor has given you a pain medicine to use as directed  You may actually find that after the first two days following your surgery, Tylenol controls your discomfort well  Do not use aspirin, Advil or similar medicines for two weeks after your surgery  These medicines can cause bleeding and can ruin your surgical result  If you have questions about a medicine, ask your doctor  You may expect some swelling and discoloration around your eyes and nose following surgery  You may gently apply an ice pack to this area during the first 48 hours following surgery  2  FEVER    Fever of up to 102 F following surgery is common  Please call the doctor should your fever run higher  3  NASAL DISCHARGE OR BLEEDING  A small amount of bleeding or nasal discharge will occur from your nose during the first 48 hours following your operation  This will gradually go away over the course of the next several days  Change the gauze drip pad under your nose as necessary (usually several times a day at first)  You may remove the gauze pad while dining to make it more comfortable for you to eat  4  NASAL HEMORRHAGING  Heavy bleeding from the nose (hemorrhaging) is very rare and usually not serious  Should it occur, try to remain calm, keep your head elevated and apply ice compresses to the side of the bleeding  Place a towel under your nostrils to absorb the blood, but do not insert cotton or paper into your nose as this may damage the result of your surgery  Again, do not use aspirin-like compounds, since they can cause bleeding   Any heavy bleeding that lasts for more than ten minutes should be reported immediately to your doctor, who may wish to see you emergently  5  NASAL PACKING AND SPLINTING  Nasal surgery requires nasal packing  This may make  you uncomfortable, because you will not be able to breathe through your nose  You will need to breathe through your mouth, and your mouth may become dry and irritated  A bedside humidifier or vaporizer will help relieve this dryness and will promote nasal healing  While your nose is packed, you may experience headaches similar to sinus infections  Please take your pain medicine and especially your antibiotic as directed by your doctor  Failure to take your antibiotic can result in toxic shock syndrome, which is characterized by high fevers, rashes and weakness which can result in death  Packing is usually removed the next day  There will be some bleeding at the time of packing removal   6  DIET    Try to take only liquids or soft foods during the first 24 hours after the surgery, and then increase your diet to normal as tolerated  Avoid very hot liquids and foods as these may cause bleeding  Do not be surprised if your meals are “tasteless” - remember, with your nose packed, you will not be able to smell, and your sense of smell contributes much to your sense of taste  You may also find that your upper lip and upper front teeth feel numb  This is due to a numbing of the nerves in the floor of your nose  This numbness is rarely permanent, but may take several weeks to go away  7  NASAL CARE    Do not blow your nose in the first week following surgery - this may loosen crusts and cause bleeding  When you do begin blowing your nose, please blow both sides at the same time  You may find that using a nasal saline spray several times a day will help prevent crusts from forming in your nose  These items are available over the counter at your local drugstore  If you must sneeze, sneeze with your mouth open  This will relieve pressure on the nose    8  ACTIVITY    Remember that the blood pressure in your nose increases whenever you lower your head below the level of your heart - this may lead to nasal bleeding  Therefore, try to sleep on two or three pillows, and be very careful about bending over in the first two weeks following surgery  You may slowly advance your physical activity, but should avoid any activities which might create excessive sweating and loosen your tapes  Do not strain at stool, lift heavy objects, etc  - this may cause nasal bleeding  If you are constipated, a stool softener or Milk of Magnesia may help  Exercise should be avoided for at least two weeks following surgery  In general, sports such as running, weight-lifting, tennis, golf and swimming may be resumed in approximately 2 weeks following surgery  If your external nose has been operated on, body contact sports such as volleyball, football, basketball and wrestling are prohibited for six weeks following surgery  When in doubt, please ask your doctor before engaging in any physical activity that could damage the result of your surgery  [facility]   [address] [place] [address]   PHONE: [phone] FAX:  [phone]  EMAIL: [email or web address]  [address] POST OP INSTRUCTIONS FOR MYRINGOTOMY TUBES    Use Tylenol for any pain  If you are interested in swim plugs, our audiologist can provide custom-made plugs  Call [phone] for information  These must be paid for at the time of ordering  You may also use the earplugs that are available at most drug stores  As always, feel free to call us if you have any questions

## 2022-10-31 NOTE — DISCHARGE SUMMARY
Discharge Summary - Aileen Patino 64 y o  female MRN: [id number]    Unit/Bed#: OR POOL Encounter: [id number]    Admission Date:     Admitting Diagnosis: Chronic serous otitis media, right ear [H65 21]  Deviated nasal septum [J34 2]  Hypertrophy of nasal turbinates [J34 3]    HPI:  Status post BMT, adenoidectomy and endoscopic sinus surgery with septoplasty in bowel inferior turbinate submucosal reduction    Procedures Performed: No orders of the defined types were placed in this encounter  Summary of Hospital Course:  Unremarkable    Significant Findings, Care, Treatment and Services Provided:  Surgery    Complications:  None    Discharge Diagnosis:  Serous otitis, adenoid hypertrophy and chronic sinusitis    Medical Problems             Resolved Problems  Date Reviewed: 10/24/2022   None                 Condition at Discharge: good         Discharge instructions/Information to patient and family:   See after visit summary for information provided to patient and family  Provisions for Follow-Up Care:  See after visit summary for information related to follow-up care and any pertinent home health orders  PCP: Albert Vela DO    Disposition: Home    Planned Readmission: No      Discharge Statement   I spent 15 minutes discharging the patient  This time was spent on the day of discharge  I had direct contact with the patient on the day of discharge  Additional documentation is required if more than 30 minutes were spent on discharge  Discharge Medications:  See after visit summary for reconciled discharge medications provided to patient and family

## 2022-10-31 NOTE — ANESTHESIA POSTPROCEDURE EVALUATION
Post-Op Assessment Note    CV Status:  Stable  Pain Score: 0    Pain management: adequate  Multimodal analgesia used between 6 hours prior to anesthesia start to PACU discharge    Mental Status:  Arousable and sleepy   Hydration Status:  Stable   PONV Controlled:  None   Airway Patency:  Patent      Post Op Vitals Reviewed: Yes      Staff: CRNA         No complications documented      /69 (10/31/22 1109)    Temp 97 7 °F (36 5 °C) (10/31/22 1109)    Pulse 78 (10/31/22 1109)   Resp 18 (10/31/22 1109)    SpO2 97 % (10/31/22 1109)

## 2022-11-01 DIAGNOSIS — F41.9 ANXIETY: ICD-10-CM

## 2022-11-01 RX ORDER — ALPRAZOLAM 0.5 MG/1
0.5 TABLET ORAL 2 TIMES DAILY PRN
Qty: 60 TABLET | Refills: 0 | Status: SHIPPED | OUTPATIENT
Start: 2022-11-01 | End: 2022-12-01

## 2022-11-03 ENCOUNTER — TELEPHONE (OUTPATIENT)
Dept: FAMILY MEDICINE CLINIC | Facility: CLINIC | Age: 57
End: 2022-11-03

## 2022-11-03 DIAGNOSIS — N76.1 SUBACUTE VAGINITIS: Primary | ICD-10-CM

## 2022-11-03 RX ORDER — FLUCONAZOLE 150 MG/1
150 TABLET ORAL ONCE
Qty: 1 TABLET | Refills: 1 | Status: SHIPPED | OUTPATIENT
Start: 2022-11-03 | End: 2022-11-03

## 2022-11-29 DIAGNOSIS — F41.9 ANXIETY: ICD-10-CM

## 2022-11-29 RX ORDER — ALPRAZOLAM 0.5 MG/1
0.5 TABLET ORAL 2 TIMES DAILY PRN
Qty: 60 TABLET | Refills: 0 | Status: SHIPPED | OUTPATIENT
Start: 2022-11-29 | End: 2022-12-29

## 2022-12-30 DIAGNOSIS — F41.9 ANXIETY: ICD-10-CM

## 2022-12-30 RX ORDER — ALPRAZOLAM 0.5 MG/1
0.5 TABLET ORAL 2 TIMES DAILY PRN
Qty: 60 TABLET | Refills: 0 | Status: SHIPPED | OUTPATIENT
Start: 2022-12-30 | End: 2023-01-29

## 2023-01-26 ENCOUNTER — OFFICE VISIT (OUTPATIENT)
Dept: FAMILY MEDICINE CLINIC | Facility: CLINIC | Age: 58
End: 2023-01-26

## 2023-01-26 VITALS
TEMPERATURE: 97.2 F | RESPIRATION RATE: 18 BRPM | SYSTOLIC BLOOD PRESSURE: 124 MMHG | WEIGHT: 192.4 LBS | HEIGHT: 63 IN | BODY MASS INDEX: 34.09 KG/M2 | HEART RATE: 76 BPM | DIASTOLIC BLOOD PRESSURE: 78 MMHG

## 2023-01-26 DIAGNOSIS — Z00.00 ANNUAL PHYSICAL EXAM: Primary | ICD-10-CM

## 2023-01-26 DIAGNOSIS — F41.9 ANXIETY: ICD-10-CM

## 2023-01-26 DIAGNOSIS — I10 ESSENTIAL HYPERTENSION: ICD-10-CM

## 2023-01-26 DIAGNOSIS — E78.2 MIXED HYPERLIPIDEMIA: ICD-10-CM

## 2023-01-26 DIAGNOSIS — R05.9 COUGH: ICD-10-CM

## 2023-01-26 RX ORDER — ALPRAZOLAM 0.5 MG/1
0.5 TABLET ORAL 2 TIMES DAILY PRN
Qty: 60 TABLET | Refills: 0 | Status: SHIPPED | OUTPATIENT
Start: 2023-01-26 | End: 2023-02-25

## 2023-01-26 RX ORDER — EZETIMIBE 10 MG/1
10 TABLET ORAL DAILY
Qty: 30 TABLET | Refills: 5 | Status: CANCELLED | OUTPATIENT
Start: 2023-01-26

## 2023-01-26 RX ORDER — CITALOPRAM 20 MG/1
20 TABLET ORAL DAILY
Qty: 90 TABLET | Refills: 3 | Status: SHIPPED | OUTPATIENT
Start: 2023-01-26

## 2023-01-26 RX ORDER — ALPRAZOLAM 0.5 MG/1
0.5 TABLET ORAL 2 TIMES DAILY PRN
Qty: 60 TABLET | Refills: 0 | Status: CANCELLED | OUTPATIENT
Start: 2023-01-26 | End: 2023-02-25

## 2023-01-26 RX ORDER — IPRATROPIUM BROMIDE 42 UG/1
SPRAY, METERED NASAL
COMMUNITY
Start: 2023-01-11

## 2023-01-26 RX ORDER — FLUTICASONE PROPIONATE 50 MCG
1 SPRAY, SUSPENSION (ML) NASAL DAILY
COMMUNITY

## 2023-01-26 RX ORDER — EZETIMIBE 10 MG/1
10 TABLET ORAL DAILY
Qty: 30 TABLET | Refills: 5 | Status: SHIPPED | OUTPATIENT
Start: 2023-01-26

## 2023-01-26 RX ORDER — METOPROLOL TARTRATE 50 MG/1
50 TABLET, FILM COATED ORAL EVERY 12 HOURS SCHEDULED
Qty: 180 TABLET | Refills: 3 | Status: SHIPPED | OUTPATIENT
Start: 2023-01-26

## 2023-01-26 NOTE — PATIENT INSTRUCTIONS

## 2023-01-26 NOTE — PROGRESS NOTES
[facility]    NAME: William Rodriguez  AGE: 62 y o  SEX: female  : 1965     DATE: 2023     Assessment and Plan:     Problem List Items Addressed This Visit        Other    Anxiety    Relevant Medications    ALPRAZolam (XANAX) 0 5 mg tablet    Hyperlipidemia    Relevant Medications    ezetimibe (ZETIA) 10 mg tablet    Annual physical exam - Primary   Other Visit Diagnoses     Essential hypertension        Relevant Medications    citalopram (CeleXA) 20 mg tablet    metoprolol tartrate (LOPRESSOR) 50 mg tablet    Cough          Refills sent to pharmacy  Pt still has nasal sxs post surgery but is using nasal sprays daily with some drainage  Stay hydrated and encouraged to resume exercise program  Stop smoking  Rto 3 months/prn    Immunizations and preventive care screenings were discussed with patient today  Appropriate education was printed on patient's after visit summary  Counseling:  · Exercise: the importance of regular exercise/physical activity was discussed  Recommend exercise 3-5 times per week for at least 30 minutes  BMI Counseling: Body mass index is 34 08 kg/m²  The BMI is above normal  Nutrition recommendations include increasing intake of lean protein  Rationale for BMI follow-up plan is due to patient being overweight or obese  Depression Screening and Follow-up Plan: Patient was screened for depression during today's encounter  They screened negative with a PHQ-2 score of 0  Return in about 3 months (around 2023), or if symptoms worsen or fail to improve, for Recheck  Chief Complaint:     Chief Complaint   Patient presents with   • Follow-up     3 month        History of Present Illness:     Adult Annual Physical   Patient here for a comprehensive physical exam  The patient reports problems - Pt has her son and children with her for past month so stress at [address] She has left hearing aide and still has nasal sxs post surgery but does get some daily drainage with nasal sprays use Cough is less than it was   Diet and Physical Activity  · Diet/Nutrition: well balanced diet  · Exercise: 1-2 times a week on average  Depression Screening  PHQ-2/9 Depression Screening    Little interest or pleasure in doing things: 0 - not at all  Feeling down, depressed, or hopeless: 0 - not at all  PHQ-2 Score: 0  PHQ-2 Interpretation: Negative depression screen       General Health  · Sleep: gets 7-8 hours of sleep on average  · Hearing: significantly decreased - left  · Vision: no vision problems  · Dental: regular dental visits  /GYN Health  · Patient is: postmenopausal  · Last menstrual period: years  · Contraceptive method: barrier methods  Review of Systems:     Review of Systems   Constitutional: Negative for chills and fever  HENT: Positive for congestion and hearing loss  Eyes: Negative for visual disturbance  Respiratory: Negative for cough and shortness of breath  Cardiovascular: Negative for chest pain, palpitations and leg swelling  Gastrointestinal: Negative for abdominal distention and abdominal pain  Genitourinary: Negative  Musculoskeletal: Negative  Neurological: Negative for dizziness, light-headedness and headaches  Psychiatric/Behavioral: Negative for sleep disturbance  The patient is not nervous/anxious         Past Medical History:     Past Medical History:   Diagnosis Date   • Anxiety    • Chronic serous otitis media, right ear    • Deviated septum    • Hypertension    • Hypertrophy of nasal turbinates    • Lung nodule    • Mixed conductive and sensorineural hearing loss of both ears       Past Surgical History:     Past Surgical History:   Procedure Laterality Date   • BREAST EXCISIONAL BIOPSY Right     benign   • BREAST EXCISIONAL BIOPSY Left     benign   • CATARACT EXTRACTION     • CATARACT EXTRACTION, BILATERAL     • HYSTERECTOMY • NY ADENOIDECTOMY PRIMARY AGE 12/> N/A 10/31/2022    Procedure: ADENOIDECTOMY;  Surgeon: Rafael Tovar MD;  Location: [facility];  Service: ENT   • NY NASAL/SINUS NDSC SURG W/NICOLE BULLOSA RESECTION N/A 10/31/2022    Procedure: EXCISION OF LEFT NICOLE BULLOSA, BILATERAL MIDDLE MEATUS ANTROSTOMIES, BILATERAL ANTERIOR ETHMOIDECTOMIES WITH FUSION;  Surgeon: Rafael Tovar MD;  Location[facility];  Service: ENT   • NY SEPTOPLASTY/SUBMUCOUS RESECJ W/WO CARTILAGE GRF N/A 10/31/2022    Procedure: SEPTOPLASTY, BILATERAL INFERIOR TURBINATE COBLATION;  Surgeon: Rafael Tovar MD;  [facility];  Service: ENT   • NY TYMPANOSTOMY GENERAL ANESTHESIA Bilateral 10/31/2022    Procedure: MYRINGOTOMY WITH TUBES;  Surgeon: Rafael Tovar MD;  Location[facility];  Service: ENT   • TUBAL LIGATION        Social History:     Social History     Socioeconomic History   • Marital status: /Civil Union     Spouse name: None   • Number of children: None   • Years of education: None   • Highest education level: None   Occupational History   • None   Tobacco Use   • Smoking status: Every Day     Packs/day: 2 00     Years: 40 00     Pack years: 80 00     Types: Cigarettes   • Smokeless tobacco: Never   Vaping Use   • Vaping Use: Never used   Substance and Sexual Activity   • Alcohol use: Not Currently   • Drug use: No   • Sexual activity: Not Currently     Partners: Male     Birth control/protection: Female Sterilization   Other Topics Concern   • None   Social History Narrative    Dental care, occasionally    Lives independently with spouse    No living will    Sun protection - sunscreen    Uses safety equipment - seatbelts     Social Determinants of Health     Financial Resource Strain: Not on file   Food Insecurity: Not on file   Transportation Needs: Not on file   Physical Activity: Not on file   Stress: Not on file   Social Connections: Not on file   Intimate Partner Violence: Not on file   Housing Stability: Not on file Family History:     Family History   Problem Relation Age of Onset   • Hypertension Mother    • Stroke Mother    • Heart disease Mother    • Parkinsonism Mother    • Dementia Mother    • Coronary artery disease Father    • Glaucoma Father    • Hypertension Father    • Stroke Father    • Diabetes Sister    • Heart disease Sister    • Lung disease Sister    • Lung cancer Sister    • Diabetes Daughter    • Cancer Maternal Grandmother         unknown origin    • Skin cancer Maternal Grandfather    • No Known Problems Paternal Grandmother    • No Known Problems Paternal Grandfather    • Bone cancer Sister    • Heart disease Sister    • Deep vein thrombosis Sister    • No Known Problems Sister    • No Known Problems Sister    • No Known Problems Sister    • No Known Problems Maternal Aunt    • No Known Problems Maternal Aunt    • No Known Problems Paternal Aunt    • No Known Problems Paternal Aunt    • No Known Problems Paternal Aunt    • No Known Problems Paternal Aunt    • No Known Problems Paternal Aunt    • No Known Problems Paternal Aunt    • No Known Problems Paternal Aunt    • No Known Problems Paternal Aunt    • No Known Problems Paternal Aunt    • No Known Problems Paternal Aunt    • Liver disease Brother    • Lung disease Brother    • Diabetes Son       Current Medications:     Current Outpatient Medications   Medication Sig Dispense Refill   • albuterol (PROVENTIL HFA,VENTOLIN HFA) 90 mcg/act inhaler Inhale 2 puffs every 6 (six) hours as needed for wheezing 8 g 3   • ALPRAZolam (XANAX) 0 5 mg tablet Take 1 tablet (0 5 mg total) by mouth 2 (two) times a day as needed for anxiety 60 tablet 0   • citalopram (CeleXA) 20 mg tablet Take 1 tablet (20 mg total) by mouth daily 90 tablet 3   • ergocalciferol (VITAMIN D2) 50,000 units take 1 capsule by mouth every week 12 capsule 3   • ezetimibe (ZETIA) 10 mg tablet Take 1 tablet (10 mg total) by mouth daily 30 tablet 5   • fluticasone (FLONASE) 50 mcg/act nasal spray 1 spray into each nostril daily     • ipratropium (ATROVENT) 0 06 % nasal spray instill 2 sprays into each nostril twice a day     • metoprolol tartrate (LOPRESSOR) 50 mg tablet Take 1 tablet (50 mg total) by mouth every 12 (twelve) hours 180 tablet 3   • azelastine (OPTIVAR) 0 05 % ophthalmic solution Administer 1 drop to both eyes in the morning (Patient not taking: Reported on 1/26/2023)     • Naproxen Sodium 220 MG CAPS Take 2 capsules by mouth if needed (Patient not taking: Reported on 1/26/2023)       No current facility-administered medications for this visit  Allergies: Allergies   Allergen Reactions   • Lorazepam Itching     Pt reports all over the body      Physical Exam:     /78   Pulse 76   Temp (!) 97 2 °F (36 2 °C) (Temporal)   Resp 18   Ht 5' 3" (1 6 m)   Wt 87 3 kg (192 lb 6 4 oz)   BMI 34 08 kg/m²     Physical Exam  Vitals reviewed  Constitutional:       General: She is not in acute distress  Appearance: Normal appearance  She is not ill-appearing, toxic-appearing or diaphoretic  HENT:      Head: Normocephalic and atraumatic  Nose: Congestion present  Mouth/Throat:      Mouth: Mucous membranes are dry  Eyes:      General: No scleral icterus  Extraocular Movements: Extraocular movements intact  Conjunctiva/sclera: Conjunctivae normal       Pupils: Pupils are equal, round, and reactive to light  Cardiovascular:      Rate and Rhythm: Normal rate and regular rhythm  Pulses: Normal pulses  Heart sounds: Normal heart sounds  Pulmonary:      Effort: Pulmonary effort is normal  No respiratory distress  Breath sounds: Normal breath sounds  No wheezing  Abdominal:      General: Bowel sounds are normal  There is no distension  Palpations: Abdomen is soft  Tenderness: There is no abdominal tenderness  Musculoskeletal:      Cervical back: Normal range of motion and neck supple  Right lower leg: No edema        Left lower leg: No edema  Lymphadenopathy:      Cervical: No cervical adenopathy  Skin:     General: Skin is warm and dry  Coloration: Skin is not jaundiced or pale  Neurological:      General: No focal deficit present  Mental Status: She is alert and oriented to person, place, and time  Mental status is at baseline  Cranial Nerves: No cranial nerve deficit  Psychiatric:         Mood and Affect: Mood normal          Behavior: Behavior normal          Thought Content:  Thought content normal          Judgment: Judgment normal           Gelacio Herzog DO  [address] [place] [address]

## 2023-02-10 ENCOUNTER — TELEPHONE (OUTPATIENT)
Dept: FAMILY MEDICINE CLINIC | Facility: CLINIC | Age: 58
End: 2023-02-10

## 2023-02-10 NOTE — TELEPHONE ENCOUNTER
Mucinex and nasal spray that she should be taking Increase fluids, tylenol (can schedule up to tid)   Would check for covid if did not to be sure as sxs overlap and if sxs linger thru the weekend may need antibiotic but most of what we are seeing recently is viral

## 2023-02-10 NOTE — TELEPHONE ENCOUNTER
Pt tested negative for covid today with a home test  I advised her of OTC meds to be taking and to call back Monday if sxs persist/ worsen

## 2023-02-10 NOTE — TELEPHONE ENCOUNTER
CC: Nasal congestion, cough, started with ear ache R, low grade fever, feels drained, - Taking Tylenol for Fever  - Was taking care of sister who is sick with same s/s    Asking if you would have any recommendations for Tx

## 2023-02-14 ENCOUNTER — TELEPHONE (OUTPATIENT)
Dept: FAMILY MEDICINE CLINIC | Facility: CLINIC | Age: 58
End: 2023-02-14

## 2023-02-14 DIAGNOSIS — J01.00 SUBACUTE MAXILLARY SINUSITIS: Primary | ICD-10-CM

## 2023-02-14 RX ORDER — AMOXICILLIN AND CLAVULANATE POTASSIUM 875; 125 MG/1; MG/1
1 TABLET, FILM COATED ORAL 2 TIMES DAILY
Qty: 14 TABLET | Refills: 0 | Status: SHIPPED | OUTPATIENT
Start: 2023-02-14 | End: 2023-02-21

## 2023-02-14 NOTE — TELEPHONE ENCOUNTER
Negative for covid, she is blowing yellow/green from her nose, congested  She said you would call in antibiotic if she did not get better  Will you order something for her?

## 2023-02-22 ENCOUNTER — VBI (OUTPATIENT)
Dept: ADMINISTRATIVE | Facility: OTHER | Age: 58
End: 2023-02-22

## 2023-02-24 ENCOUNTER — TELEPHONE (OUTPATIENT)
Dept: FAMILY MEDICINE CLINIC | Facility: CLINIC | Age: 58
End: 2023-02-24

## 2023-02-24 ENCOUNTER — APPOINTMENT (OUTPATIENT)
Dept: LAB | Facility: MEDICAL CENTER | Age: 58
End: 2023-02-24

## 2023-02-24 DIAGNOSIS — G62.9 NEUROPATHY: ICD-10-CM

## 2023-02-24 DIAGNOSIS — D72.819 LEUKOPENIA, UNSPECIFIED TYPE: ICD-10-CM

## 2023-02-24 DIAGNOSIS — D72.819 LEUKOPENIA, UNSPECIFIED TYPE: Primary | ICD-10-CM

## 2023-02-24 LAB
ERYTHROCYTE [DISTWIDTH] IN BLOOD BY AUTOMATED COUNT: 12.5 % (ref 11.6–15.1)
HCT VFR BLD AUTO: 48.1 % (ref 34.8–46.1)
HGB BLD-MCNC: 15.6 G/DL (ref 11.5–15.4)
MCH RBC QN AUTO: 29.1 PG (ref 26.8–34.3)
MCHC RBC AUTO-ENTMCNC: 32.4 G/DL (ref 31.4–37.4)
MCV RBC AUTO: 90 FL (ref 82–98)
PLATELET # BLD AUTO: 348 THOUSANDS/UL (ref 149–390)
PMV BLD AUTO: 10.6 FL (ref 8.9–12.7)
RBC # BLD AUTO: 5.37 MILLION/UL (ref 3.81–5.12)
WBC # BLD AUTO: 10.63 THOUSAND/UL (ref 4.31–10.16)

## 2023-02-24 NOTE — TELEPHONE ENCOUNTER
Labs ordered and would check those firsdt I added others but may need back xrays as toe sxs probably related to low back

## 2023-02-24 NOTE — TELEPHONE ENCOUNTER
Pt calling stating she typically goes for blood work due to her white blood cell count being off  She is questioning when she needs to go for lab work again  She also is having pain from her back down to her toes  She is having difficulty getting out of bed or off of couch  She wants to know what to do or if related to this white blood cell count?

## 2023-02-25 ENCOUNTER — TELEPHONE (OUTPATIENT)
Dept: FAMILY MEDICINE CLINIC | Facility: CLINIC | Age: 58
End: 2023-02-25

## 2023-02-25 LAB
ALBUMIN SERPL BCP-MCNC: 3.4 G/DL (ref 3.5–5)
ALP SERPL-CCNC: 111 U/L (ref 46–116)
ALT SERPL W P-5'-P-CCNC: 21 U/L (ref 12–78)
ANION GAP SERPL CALCULATED.3IONS-SCNC: 7 MMOL/L (ref 4–13)
AST SERPL W P-5'-P-CCNC: 18 U/L (ref 5–45)
BILIRUB SERPL-MCNC: 0.35 MG/DL (ref 0.2–1)
BUN SERPL-MCNC: 7 MG/DL (ref 5–25)
CALCIUM ALBUM COR SERPL-MCNC: 9.7 MG/DL (ref 8.3–10.1)
CALCIUM SERPL-MCNC: 9.2 MG/DL (ref 8.3–10.1)
CHLORIDE SERPL-SCNC: 105 MMOL/L (ref 96–108)
CO2 SERPL-SCNC: 24 MMOL/L (ref 21–32)
CREAT SERPL-MCNC: 1.02 MG/DL (ref 0.6–1.3)
GFR SERPL CREATININE-BSD FRML MDRD: 61 ML/MIN/1.73SQ M
GLUCOSE SERPL-MCNC: 63 MG/DL (ref 65–140)
POTASSIUM SERPL-SCNC: 4 MMOL/L (ref 3.5–5.3)
PROT SERPL-MCNC: 7.3 G/DL (ref 6.4–8.4)
SODIUM SERPL-SCNC: 136 MMOL/L (ref 135–147)
VIT B12 SERPL-MCNC: 195 PG/ML (ref 100–900)

## 2023-02-25 NOTE — TELEPHONE ENCOUNTER
Called pt to give results of blood and schedule b12 injections  Pt is asking if she would be able to get recent Covid vaccine, because when she just went through all her surgeries they told her to hold off on getting them  She wanted to know would getting the new vaccine interfere with the B12 shots  Please advise

## 2023-02-25 NOTE — TELEPHONE ENCOUNTER
In the past they had recommended waiting 2 weeks following an injection to get the vaccine if it was a medication o other vaccine   B 12 would not interfere regardless so ok to schedule when she is able to start them

## 2023-02-27 ENCOUNTER — CLINICAL SUPPORT (OUTPATIENT)
Dept: FAMILY MEDICINE CLINIC | Facility: CLINIC | Age: 58
End: 2023-02-27

## 2023-02-27 DIAGNOSIS — E53.8 VITAMIN B 12 DEFICIENCY: Primary | ICD-10-CM

## 2023-02-27 RX ORDER — CYANOCOBALAMIN 1000 UG/ML
1000 INJECTION, SOLUTION INTRAMUSCULAR; SUBCUTANEOUS
Status: SHIPPED | OUTPATIENT
Start: 2023-02-27

## 2023-02-27 RX ADMIN — CYANOCOBALAMIN 1000 MCG: 1000 INJECTION, SOLUTION INTRAMUSCULAR; SUBCUTANEOUS at 11:15

## 2023-02-28 DIAGNOSIS — F41.9 ANXIETY: ICD-10-CM

## 2023-02-28 RX ORDER — ALPRAZOLAM 0.5 MG/1
0.5 TABLET ORAL 2 TIMES DAILY PRN
Qty: 60 TABLET | Refills: 0 | Status: SHIPPED | OUTPATIENT
Start: 2023-02-28 | End: 2023-03-30

## 2023-03-06 ENCOUNTER — TELEPHONE (OUTPATIENT)
Dept: FAMILY MEDICINE CLINIC | Facility: CLINIC | Age: 58
End: 2023-03-06

## 2023-03-06 ENCOUNTER — CLINICAL SUPPORT (OUTPATIENT)
Dept: FAMILY MEDICINE CLINIC | Facility: CLINIC | Age: 58
End: 2023-03-06

## 2023-03-06 DIAGNOSIS — E53.8 VITAMIN B 12 DEFICIENCY: Primary | ICD-10-CM

## 2023-03-06 NOTE — TELEPHONE ENCOUNTER
Can someone make a note to try Paulie Coppola again with message later today or tomorrow if cannot reach her TY

## 2023-03-06 NOTE — TELEPHONE ENCOUNTER
She can try plain allegra or coricidin not the d but I would not feel comfortable with the rinses unless the surgeon approved since they did procedure  She can call and ask to be on cancel list to see if can get seen there sooner Allergy/sinus sxs for many patients are already challenging and unfortunately not new meds

## 2023-03-06 NOTE — TELEPHONE ENCOUNTER
When pt came in for B12 shot, asking if you have any other recommendations for her nasal congestion  Said she is doing the nasal spray like the surgeon had suggested, told her she cannot do the Allegra D  Asking if she could do one of the OTC "nasal cleanouts" or if you wanted to see her sooner than her 4/26 appt since she doesn't see the surgeon for a f/u until 4/6

## 2023-03-13 ENCOUNTER — CLINICAL SUPPORT (OUTPATIENT)
Dept: FAMILY MEDICINE CLINIC | Facility: CLINIC | Age: 58
End: 2023-03-13

## 2023-03-13 DIAGNOSIS — R05.9 COUGH: ICD-10-CM

## 2023-03-13 DIAGNOSIS — E53.8 VITAMIN B 12 DEFICIENCY: Primary | ICD-10-CM

## 2023-03-13 RX ORDER — ALBUTEROL SULFATE 90 UG/1
2 AEROSOL, METERED RESPIRATORY (INHALATION) EVERY 6 HOURS PRN
Qty: 8 G | Refills: 3 | Status: SHIPPED | OUTPATIENT
Start: 2023-03-13

## 2023-03-13 RX ADMIN — CYANOCOBALAMIN 1000 MCG: 1000 INJECTION, SOLUTION INTRAMUSCULAR; SUBCUTANEOUS at 11:12

## 2023-03-20 ENCOUNTER — CLINICAL SUPPORT (OUTPATIENT)
Dept: FAMILY MEDICINE CLINIC | Facility: CLINIC | Age: 58
End: 2023-03-20

## 2023-03-20 DIAGNOSIS — E55.9 VITAMIN D DEFICIENCY: ICD-10-CM

## 2023-03-20 DIAGNOSIS — E53.8 VITAMIN B 12 DEFICIENCY: Primary | ICD-10-CM

## 2023-03-20 RX ORDER — ERGOCALCIFEROL 1.25 MG/1
CAPSULE ORAL
Qty: 12 CAPSULE | Refills: 3 | Status: SHIPPED | OUTPATIENT
Start: 2023-03-20

## 2023-03-20 RX ADMIN — CYANOCOBALAMIN 1000 MCG: 1000 INJECTION, SOLUTION INTRAMUSCULAR; SUBCUTANEOUS at 11:20

## 2023-03-30 DIAGNOSIS — F41.9 ANXIETY: ICD-10-CM

## 2023-03-30 RX ORDER — ALPRAZOLAM 0.5 MG/1
0.5 TABLET ORAL 2 TIMES DAILY PRN
Qty: 60 TABLET | Refills: 0 | Status: SHIPPED | OUTPATIENT
Start: 2023-03-30 | End: 2023-04-29

## 2023-04-07 ENCOUNTER — TELEPHONE (OUTPATIENT)
Dept: FAMILY MEDICINE CLINIC | Facility: CLINIC | Age: 58
End: 2023-04-07

## 2023-04-07 DIAGNOSIS — B37.9 YEAST INFECTION: Primary | ICD-10-CM

## 2023-04-07 RX ORDER — FLUCONAZOLE 150 MG/1
150 TABLET ORAL ONCE
Qty: 1 TABLET | Refills: 0 | Status: SHIPPED | OUTPATIENT
Start: 2023-04-07 | End: 2023-04-07

## 2023-04-07 NOTE — TELEPHONE ENCOUNTER
Pt called requesting something for a yeast infection  Sent to [facility]

## 2023-04-26 ENCOUNTER — APPOINTMENT (OUTPATIENT)
Dept: LAB | Facility: MEDICAL CENTER | Age: 58
End: 2023-04-26

## 2023-04-26 ENCOUNTER — OFFICE VISIT (OUTPATIENT)
Dept: FAMILY MEDICINE CLINIC | Facility: CLINIC | Age: 58
End: 2023-04-26

## 2023-04-26 VITALS
SYSTOLIC BLOOD PRESSURE: 128 MMHG | WEIGHT: 190 LBS | HEART RATE: 78 BPM | DIASTOLIC BLOOD PRESSURE: 78 MMHG | TEMPERATURE: 97.2 F | BODY MASS INDEX: 33.66 KG/M2 | RESPIRATION RATE: 18 BRPM | HEIGHT: 63 IN

## 2023-04-26 DIAGNOSIS — R53.83 FATIGUE, UNSPECIFIED TYPE: ICD-10-CM

## 2023-04-26 DIAGNOSIS — J32.9 RECURRENT SINUSITIS: Primary | ICD-10-CM

## 2023-04-26 DIAGNOSIS — I10 PRIMARY HYPERTENSION: ICD-10-CM

## 2023-04-26 DIAGNOSIS — J32.9 RECURRENT SINUSITIS: ICD-10-CM

## 2023-04-26 DIAGNOSIS — J30.1 NON-SEASONAL ALLERGIC RHINITIS DUE TO POLLEN: ICD-10-CM

## 2023-04-26 PROBLEM — H25.9 AGE-RELATED CATARACT OF BOTH EYES: Status: RESOLVED | Noted: 2020-10-21 | Resolved: 2023-04-26

## 2023-04-26 PROBLEM — H69.93 ETD (EUSTACHIAN TUBE DYSFUNCTION), BILATERAL: Status: ACTIVE | Noted: 2023-04-06

## 2023-04-26 PROBLEM — J01.20 ACUTE NON-RECURRENT ETHMOIDAL SINUSITIS: Status: ACTIVE | Noted: 2023-04-06

## 2023-04-26 PROBLEM — T81.31XA WOUND DEHISCENCE, SURGICAL: Status: RESOLVED | Noted: 2021-02-22 | Resolved: 2023-04-26

## 2023-04-26 PROBLEM — H69.83 ETD (EUSTACHIAN TUBE DYSFUNCTION), BILATERAL: Status: ACTIVE | Noted: 2023-04-06

## 2023-04-26 LAB
ALBUMIN SERPL BCP-MCNC: 3.4 G/DL (ref 3.5–5)
ALP SERPL-CCNC: 129 U/L (ref 46–116)
ALT SERPL W P-5'-P-CCNC: 44 U/L (ref 12–78)
ANION GAP SERPL CALCULATED.3IONS-SCNC: 5 MMOL/L (ref 4–13)
AST SERPL W P-5'-P-CCNC: 31 U/L (ref 5–45)
BILIRUB SERPL-MCNC: 0.28 MG/DL (ref 0.2–1)
BUN SERPL-MCNC: 8 MG/DL (ref 5–25)
CALCIUM ALBUM COR SERPL-MCNC: 9.4 MG/DL (ref 8.3–10.1)
CALCIUM SERPL-MCNC: 8.9 MG/DL (ref 8.3–10.1)
CHLORIDE SERPL-SCNC: 105 MMOL/L (ref 96–108)
CO2 SERPL-SCNC: 22 MMOL/L (ref 21–32)
CREAT SERPL-MCNC: 1.05 MG/DL (ref 0.6–1.3)
ERYTHROCYTE [DISTWIDTH] IN BLOOD BY AUTOMATED COUNT: 13.2 % (ref 11.6–15.1)
GFR SERPL CREATININE-BSD FRML MDRD: 59 ML/MIN/1.73SQ M
GLUCOSE P FAST SERPL-MCNC: 59 MG/DL (ref 65–99)
HCT VFR BLD AUTO: 50 % (ref 34.8–46.1)
HGB BLD-MCNC: 16.4 G/DL (ref 11.5–15.4)
MCH RBC QN AUTO: 29.3 PG (ref 26.8–34.3)
MCHC RBC AUTO-ENTMCNC: 32.8 G/DL (ref 31.4–37.4)
MCV RBC AUTO: 89 FL (ref 82–98)
PLATELET # BLD AUTO: 428 THOUSANDS/UL (ref 149–390)
PMV BLD AUTO: 9.9 FL (ref 8.9–12.7)
POTASSIUM SERPL-SCNC: 4.6 MMOL/L (ref 3.5–5.3)
PROT SERPL-MCNC: 7.5 G/DL (ref 6.4–8.4)
RBC # BLD AUTO: 5.59 MILLION/UL (ref 3.81–5.12)
SODIUM SERPL-SCNC: 132 MMOL/L (ref 135–147)
TSH SERPL DL<=0.05 MIU/L-ACNC: 2.18 UIU/ML (ref 0.45–4.5)
WBC # BLD AUTO: 8.43 THOUSAND/UL (ref 4.31–10.16)

## 2023-04-26 RX ORDER — SULFAMETHOXAZOLE AND TRIMETHOPRIM 800; 160 MG/1; MG/1
TABLET ORAL
COMMUNITY
Start: 2023-04-20

## 2023-04-26 RX ORDER — B-COMPLEX WITH VITAMIN C
1 TABLET ORAL DAILY
COMMUNITY

## 2023-04-26 RX ORDER — LEVOCETIRIZINE DIHYDROCHLORIDE 5 MG/1
5 TABLET, FILM COATED ORAL EVERY EVENING
Qty: 30 TABLET | Refills: 3 | Status: SHIPPED | OUTPATIENT
Start: 2023-04-26

## 2023-04-26 RX ORDER — MONTELUKAST SODIUM 10 MG/1
10 TABLET ORAL DAILY
COMMUNITY
Start: 2023-04-20

## 2023-04-26 RX ORDER — ASPIRIN 325 MG
325 TABLET ORAL DAILY
COMMUNITY

## 2023-04-26 RX ADMIN — CYANOCOBALAMIN 1000 MCG: 1000 INJECTION, SOLUTION INTRAMUSCULAR; SUBCUTANEOUS at 10:44

## 2023-04-26 NOTE — PROGRESS NOTES
Name: Oscar Estrada      : 1965      MRN: [id number]  Encounter Provider: Cedric Regalado DO  Encounter Date: 2023   Encounter department: [address] [place] [address] Road     1  Recurrent sinusitis  -     levocetirizine (XYZAL) 5 MG tablet; Take 1 tablet (5 mg total) by mouth every evening  -     CBC and Platelet; Future  -     TSH, 3rd generation; Future  -     Comprehensive metabolic panel; Future  Trial xyzal if covered since no change with current rx although with additional antibx and flushing pt does not some improvement  ET followup upcoming    2  Fatigue, unspecified type  -     CBC and Platelet; Future  -     TSH, 3rd generation; Future  -     Comprehensive metabolic panel; Future  Labs ordered Continue B12 injections  3  Non-seasonal allergic rhinitis due to pollen  Stay hydrated Follow with ent     4  Primary hypertension  Stable Continue current rx         Depression Screening and Follow-up Plan: Patient was screened for depression during today's encounter  They screened negative with a PHQ-2 score of 0  Rto 3 months   Subjective      HPI   Pt slowly noting improvement in sinus sxs She sees ent about every 2 weeks and is on additional antibx which have helped to some degree Still has mucous coming out but it is clear now No chest pain or sob No fever or chills She is doing saline rinses and nasal spray as well   Review of Systems   Constitutional: Negative for chills and fever  HENT: Positive for congestion, postnasal drip and sinus pressure  Eyes: Negative for visual disturbance  Respiratory: Negative for cough and shortness of breath  Cardiovascular: Negative for chest pain, palpitations and leg swelling  Gastrointestinal: Negative for abdominal distention and abdominal pain  Genitourinary: Negative  Neurological: Negative for dizziness, light-headedness and headaches  Psychiatric/Behavioral: Negative for sleep disturbance   The patient is not "nervous/anxious  Current Outpatient Medications on File Prior to Visit   Medication Sig   • albuterol (PROVENTIL HFA,VENTOLIN HFA) 90 mcg/act inhaler Inhale 2 puffs every 6 (six) hours as needed for wheezing   • ALPRAZolam (XANAX) 0 5 mg tablet Take 1 tablet (0 5 mg total) by mouth 2 (two) times a day as needed for anxiety   • azelastine (OPTIVAR) 0 05 % ophthalmic solution Administer 1 drop to both eyes in the morning   • citalopram (CeleXA) 20 mg tablet Take 1 tablet (20 mg total) by mouth daily   • ergocalciferol (VITAMIN D2) 50,000 units take 1 capsule by mouth every week   • ezetimibe (ZETIA) 10 mg tablet Take 1 tablet (10 mg total) by mouth daily   • ipratropium (ATROVENT) 0 06 % nasal spray instill 2 sprays into each nostril twice a day   • metoprolol tartrate (LOPRESSOR) 50 mg tablet Take 1 tablet (50 mg total) by mouth every 12 (twelve) hours   • sulfamethoxazole-trimethoprim (BACTRIM DS) 800-160 mg per tablet take 1 tablet by mouth every morning and BEFORE BEDTIME   • fluticasone (FLONASE) 50 mcg/act nasal spray 1 spray into each nostril daily (Patient not taking: Reported on 4/26/2023)   • Naproxen Sodium 220 MG CAPS Take 2 capsules by mouth if needed (Patient not taking: Reported on 1/26/2023)       Objective     /78   Pulse 78   Temp (!) 97 2 °F (36 2 °C) (Temporal)   Resp 18   Ht 5' 3\" (1 6 m)   Wt 86 2 kg (190 lb)   BMI 33 66 kg/m²     Physical Exam  Vitals reviewed  Constitutional:       General: She is not in acute distress  Appearance: Normal appearance  She is not ill-appearing, toxic-appearing or diaphoretic  HENT:      Head: Normocephalic and atraumatic  Right Ear: External ear normal       Left Ear: External ear normal       Nose: Nose normal       Mouth/Throat:      Mouth: Mucous membranes are moist    Eyes:      General: No scleral icterus  Extraocular Movements: Extraocular movements intact        Conjunctiva/sclera: Conjunctivae normal       Pupils: Pupils " are equal, round, and reactive to light  Cardiovascular:      Rate and Rhythm: Normal rate and regular rhythm  Pulses: Normal pulses  Heart sounds: Normal heart sounds  Pulmonary:      Effort: Pulmonary effort is normal  No respiratory distress  Breath sounds: Normal breath sounds  No wheezing  Abdominal:      General: Bowel sounds are normal  There is no distension  Palpations: Abdomen is soft  Tenderness: There is no abdominal tenderness  Musculoskeletal:      Cervical back: Normal range of motion and neck supple  Right lower leg: No edema  Left lower leg: No edema  Lymphadenopathy:      Cervical: No cervical adenopathy  Skin:     General: Skin is warm and dry  Neurological:      General: No focal deficit present  Mental Status: She is alert and oriented to person, place, and time  Mental status is at baseline  Cranial Nerves: No cranial nerve deficit  Sensory: No sensory deficit  Psychiatric:         Mood and Affect: Mood normal          Behavior: Behavior normal          Thought Content:  Thought content normal          Judgment: Judgment normal        Neil Allison DO

## 2023-05-01 DIAGNOSIS — F41.9 ANXIETY: ICD-10-CM

## 2023-05-01 RX ORDER — ALPRAZOLAM 0.5 MG/1
0.5 TABLET ORAL 2 TIMES DAILY PRN
Qty: 60 TABLET | Refills: 0 | Status: SHIPPED | OUTPATIENT
Start: 2023-05-01 | End: 2023-05-31

## 2023-05-22 ENCOUNTER — VBI (OUTPATIENT)
Dept: ADMINISTRATIVE | Facility: OTHER | Age: 58
End: 2023-05-22

## 2023-05-26 ENCOUNTER — CLINICAL SUPPORT (OUTPATIENT)
Dept: FAMILY MEDICINE CLINIC | Facility: CLINIC | Age: 58
End: 2023-05-26

## 2023-05-26 ENCOUNTER — TELEPHONE (OUTPATIENT)
Dept: FAMILY MEDICINE CLINIC | Facility: CLINIC | Age: 58
End: 2023-05-26

## 2023-05-26 DIAGNOSIS — J30.89 OTHER ALLERGIC RHINITIS: Primary | ICD-10-CM

## 2023-05-26 DIAGNOSIS — E53.8 VITAMIN B12 DEFICIENCY: Primary | ICD-10-CM

## 2023-05-26 RX ORDER — METHYLPREDNISOLONE ACETATE 40 MG/ML
40 INJECTION, SUSPENSION INTRA-ARTICULAR; INTRALESIONAL; INTRAMUSCULAR; SOFT TISSUE ONCE
Status: COMPLETED | OUTPATIENT
Start: 2023-05-26 | End: 2023-05-26

## 2023-05-26 RX ADMIN — CYANOCOBALAMIN 1000 MCG: 1000 INJECTION, SOLUTION INTRAMUSCULAR; SUBCUTANEOUS at 09:49

## 2023-05-26 RX ADMIN — METHYLPREDNISOLONE ACETATE 40 MG: 40 INJECTION, SUSPENSION INTRA-ARTICULAR; INTRALESIONAL; INTRAMUSCULAR; SOFT TISSUE at 11:26

## 2023-05-26 NOTE — TELEPHONE ENCOUNTER
When pt came in for her b12 injection today, said about 2 weeks ago when she was here she was stung by some nance bees while doing yard work and has been very itchy since then, picking her skin open  Said she is interested in getting a steroid injection  Wasn't sure if it would interfere with the b12 injection, wanted to run it by you first  Please advise

## 2023-06-01 DIAGNOSIS — F41.9 ANXIETY: ICD-10-CM

## 2023-06-01 RX ORDER — ALPRAZOLAM 0.5 MG/1
0.5 TABLET ORAL 2 TIMES DAILY PRN
Qty: 60 TABLET | Refills: 0 | Status: SHIPPED | OUTPATIENT
Start: 2023-06-01 | End: 2023-07-01

## 2023-06-05 DIAGNOSIS — R05.9 COUGH: ICD-10-CM

## 2023-06-05 RX ORDER — ALBUTEROL SULFATE 90 UG/1
2 AEROSOL, METERED RESPIRATORY (INHALATION) EVERY 6 HOURS PRN
Qty: 8 G | Refills: 3 | Status: SHIPPED | OUTPATIENT
Start: 2023-06-05

## 2023-06-25 PROBLEM — J01.20 ACUTE NON-RECURRENT ETHMOIDAL SINUSITIS: Status: RESOLVED | Noted: 2023-04-06 | Resolved: 2023-06-25

## 2023-06-25 PROBLEM — J32.9 RECURRENT SINUSITIS: Status: RESOLVED | Noted: 2023-04-26 | Resolved: 2023-06-25

## 2023-06-26 ENCOUNTER — CLINICAL SUPPORT (OUTPATIENT)
Dept: FAMILY MEDICINE CLINIC | Facility: CLINIC | Age: 58
End: 2023-06-26
Payer: COMMERCIAL

## 2023-06-26 DIAGNOSIS — E53.8 VITAMIN B12 DEFICIENCY: Primary | ICD-10-CM

## 2023-06-26 PROCEDURE — 96372 THER/PROPH/DIAG INJ SC/IM: CPT | Performed by: INTERNAL MEDICINE

## 2023-06-26 RX ADMIN — CYANOCOBALAMIN 1000 MCG: 1000 INJECTION, SOLUTION INTRAMUSCULAR; SUBCUTANEOUS at 09:23

## 2023-06-29 DIAGNOSIS — F41.9 ANXIETY: ICD-10-CM

## 2023-06-29 RX ORDER — ALPRAZOLAM 0.5 MG/1
0.5 TABLET ORAL 2 TIMES DAILY PRN
Qty: 60 TABLET | Refills: 0 | Status: SHIPPED | OUTPATIENT
Start: 2023-06-29 | End: 2023-07-29

## 2023-07-10 DIAGNOSIS — N76.1 SUBACUTE VAGINITIS: ICD-10-CM

## 2023-07-10 RX ORDER — FEXOFENADINE HCL AND PSEUDOEPHEDRINE HCI 60; 120 MG/1; MG/1
1 TABLET, EXTENDED RELEASE ORAL 2 TIMES DAILY
COMMUNITY
Start: 2023-05-04

## 2023-07-10 RX ORDER — FLUCONAZOLE 150 MG/1
150 TABLET ORAL ONCE
Qty: 1 TABLET | Refills: 0 | Status: SHIPPED | OUTPATIENT
Start: 2023-07-10 | End: 2023-07-10

## 2023-07-26 ENCOUNTER — OFFICE VISIT (OUTPATIENT)
Dept: FAMILY MEDICINE CLINIC | Facility: CLINIC | Age: 58
End: 2023-07-26
Payer: COMMERCIAL

## 2023-07-26 VITALS
RESPIRATION RATE: 18 BRPM | WEIGHT: 182 LBS | BODY MASS INDEX: 32.25 KG/M2 | DIASTOLIC BLOOD PRESSURE: 78 MMHG | SYSTOLIC BLOOD PRESSURE: 124 MMHG | HEIGHT: 63 IN | HEART RATE: 78 BPM | TEMPERATURE: 97.6 F

## 2023-07-26 DIAGNOSIS — J32.9 RECURRENT SINUSITIS: ICD-10-CM

## 2023-07-26 DIAGNOSIS — E78.2 MIXED HYPERLIPIDEMIA: ICD-10-CM

## 2023-07-26 DIAGNOSIS — E55.9 VITAMIN D DEFICIENCY: ICD-10-CM

## 2023-07-26 DIAGNOSIS — E53.8 VITAMIN B12 DEFICIENCY: ICD-10-CM

## 2023-07-26 DIAGNOSIS — R05.9 COUGH: ICD-10-CM

## 2023-07-26 DIAGNOSIS — I10 ESSENTIAL HYPERTENSION: ICD-10-CM

## 2023-07-26 DIAGNOSIS — F41.9 ANXIETY: ICD-10-CM

## 2023-07-26 PROCEDURE — 99214 OFFICE O/P EST MOD 30 MIN: CPT | Performed by: INTERNAL MEDICINE

## 2023-07-26 RX ORDER — HYDROXYZINE HYDROCHLORIDE 10 MG/1
10 TABLET, FILM COATED ORAL EVERY 8 HOURS PRN
Qty: 60 TABLET | Refills: 0 | Status: SHIPPED | OUTPATIENT
Start: 2023-07-26

## 2023-07-26 RX ORDER — ALBUTEROL SULFATE 90 UG/1
2 AEROSOL, METERED RESPIRATORY (INHALATION) EVERY 6 HOURS PRN
Qty: 8 G | Refills: 3 | Status: SHIPPED | OUTPATIENT
Start: 2023-07-26

## 2023-07-26 RX ORDER — METOPROLOL TARTRATE 50 MG/1
50 TABLET, FILM COATED ORAL EVERY 12 HOURS SCHEDULED
Qty: 180 TABLET | Refills: 3 | Status: SHIPPED | OUTPATIENT
Start: 2023-07-26

## 2023-07-26 RX ORDER — MONTELUKAST SODIUM 10 MG/1
10 TABLET ORAL DAILY
Qty: 90 TABLET | Refills: 3 | Status: SHIPPED | OUTPATIENT
Start: 2023-07-26

## 2023-07-26 RX ORDER — LEVOCETIRIZINE DIHYDROCHLORIDE 5 MG/1
5 TABLET, FILM COATED ORAL EVERY EVENING
Qty: 90 TABLET | Refills: 3 | Status: SHIPPED | OUTPATIENT
Start: 2023-07-26

## 2023-07-26 RX ORDER — ERGOCALCIFEROL 1.25 MG/1
50000 CAPSULE ORAL WEEKLY
Qty: 12 CAPSULE | Refills: 3 | Status: SHIPPED | OUTPATIENT
Start: 2023-07-26

## 2023-07-26 RX ORDER — CITALOPRAM 20 MG/1
20 TABLET ORAL DAILY
Qty: 90 TABLET | Refills: 3 | Status: SHIPPED | OUTPATIENT
Start: 2023-07-26

## 2023-07-26 RX ORDER — EZETIMIBE 10 MG/1
10 TABLET ORAL DAILY
Qty: 90 TABLET | Refills: 3 | Status: SHIPPED | OUTPATIENT
Start: 2023-07-26

## 2023-07-26 RX ORDER — ALPRAZOLAM 0.5 MG/1
0.5 TABLET ORAL 2 TIMES DAILY PRN
Qty: 60 TABLET | Refills: 0 | Status: SHIPPED | OUTPATIENT
Start: 2023-07-26 | End: 2023-08-25

## 2023-07-26 RX ADMIN — CYANOCOBALAMIN 1000 MCG: 1000 INJECTION, SOLUTION INTRAMUSCULAR; SUBCUTANEOUS at 12:00

## 2023-07-26 NOTE — PROGRESS NOTES
Name: Zach Gunter      : 1965      MRN: [id number]  Encounter Provider: Mimi Pedraza DO  Encounter Date: 2023   Encounter department: [address] [place] [address]     1. Cough  -     albuterol (PROVENTIL HFA,VENTOLIN HFA) 90 mcg/act inhaler; Inhale 2 puffs every 6 (six) hours as needed for wheezing  -     montelukast (SINGULAIR) 10 mg tablet; Take 1 tablet (10 mg total) by mouth daily  Doing ok with current regimen     2. Essential hypertension  -     citalopram (CeleXA) 20 mg tablet; Take 1 tablet (20 mg total) by mouth daily  -     metoprolol tartrate (LOPRESSOR) 50 mg tablet; Take 1 tablet (50 mg total) by mouth every 12 (twelve) hours  She is under more stress so cautioned to monitor bp Limit sodium Stay hydrated Limit caffeine    3. Vitamin D deficiency  -     ergocalciferol (VITAMIN D2) 50,000 units; Take 1 capsule (50,000 Units total) by mouth once a week    4. Mixed hyperlipidemia  -     ezetimibe (ZETIA) 10 mg tablet; Take 1 tablet (10 mg total) by mouth daily  Low fat diet   5. Recurrent sinusitis  -     levocetirizine (XYZAL) 5 MG tablet; Take 1 tablet (5 mg total) by mouth every evening  -     montelukast (SINGULAIR) 10 mg tablet; Take 1 tablet (10 mg total) by mouth daily  Follows with ENT Sxs fairly controlled currently     6. Anxiety  -     ALPRAZolam (XANAX) 0.5 mg tablet; Take 1 tablet (0.5 mg total) by mouth 2 (two) times a day as needed for anxiety  -     hydrOXYzine HCL (ATARAX) 10 mg tablet; Take 1 tablet (10 mg total) by mouth every 8 (eight) hours as needed for itching or anxiety    7.  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 HENT: Negative. Eyes: Negative for visual disturbance. Respiratory: Negative for cough and shortness of breath. Cardiovascular: Negative for chest pain, palpitations and leg swelling. Gastrointestinal: Negative for abdominal distention and abdominal pain. Genitourinary: Negative. Musculoskeletal: Positive for arthralgias. Neurological: Negative for dizziness, light-headedness and headaches. Psychiatric/Behavioral: Negative for sleep disturbance.        Current Outpatient Medications on File Prior to Visit   Medication Sig   • aspirin 325 mg tablet Take 325 mg by mouth daily   • azelastine (OPTIVAR) 0.05 % ophthalmic solution Administer 1 drop to both eyes in the morning   • B Complex Vitamins (Vitamin B Complex) TABS Take 1 tablet by mouth daily   • Cetirizine HCl 10 MG CAPS Take 10 mg by mouth daily   • fexofenadine-pseudoephedrine (ALLEGRA-D)  MG per tablet Take 1 tablet by mouth 2 (two) times a day   • fluticasone (FLONASE) 50 mcg/act nasal spray 1 spray into each nostril daily   • ipratropium (ATROVENT) 0.06 % nasal spray instill 2 sprays into each nostril twice a day   • Naproxen Sodium 220 MG CAPS Take 2 capsules by mouth if needed   • sulfamethoxazole-trimethoprim (BACTRIM DS) 800-160 mg per tablet take 1 tablet by mouth every morning and BEFORE BEDTIME   • [DISCONTINUED] albuterol (PROVENTIL HFA,VENTOLIN HFA) 90 mcg/act inhaler Inhale 2 puffs every 6 (six) hours as needed for wheezing   • [DISCONTINUED] ALPRAZolam (XANAX) 0.5 mg tablet Take 1 tablet (0.5 mg total) by mouth 2 (two) times a day as needed for anxiety   • [DISCONTINUED] citalopram (CeleXA) 20 mg tablet Take 1 tablet (20 mg total) by mouth daily   • [DISCONTINUED] ergocalciferol (VITAMIN D2) 50,000 units take 1 capsule by mouth every week   • [DISCONTINUED] ezetimibe (ZETIA) 10 mg tablet Take 1 tablet (10 mg total) by mouth daily   • [DISCONTINUED] levocetirizine (XYZAL) 5 MG tablet Take 1 tablet (5 mg total) by mouth every evening   • [DISCONTINUED] metoprolol tartrate (LOPRESSOR) 50 mg tablet Take 1 tablet (50 mg total) by mouth every 12 (twelve) hours   • [DISCONTINUED] montelukast (SINGULAIR) 10 mg tablet Take 10 mg by mouth daily       Objective     /78   Pulse 78   Temp 97.6 °F (36.4 °C) (Temporal)   Resp 18   Ht 5' 3" (1.6 m)   Wt 82.6 kg (182 lb)   BMI 32.24 kg/m²     Physical Exam  Vitals reviewed. Constitutional:       General: She is not in acute distress. Appearance: Normal appearance. She is not ill-appearing, toxic-appearing or diaphoretic. HENT:      Head: Normocephalic and atraumatic. Right Ear: External ear normal.      Left Ear: External ear normal.      Nose: Nose normal.      Mouth/Throat:      Mouth: Mucous membranes are dry. Eyes:      General: No scleral icterus. Extraocular Movements: Extraocular movements intact. Conjunctiva/sclera: Conjunctivae normal.      Pupils: Pupils are equal, round, and reactive to light. Cardiovascular:      Rate and Rhythm: Normal rate and regular rhythm. Pulses: Normal pulses. Pulmonary:      Effort: Pulmonary effort is normal. No respiratory distress. Breath sounds: Normal breath sounds. No wheezing. Abdominal:      General: Bowel sounds are normal. There is no distension. Palpations: Abdomen is soft. Tenderness: There is no abdominal tenderness. Musculoskeletal:      Cervical back: Normal range of motion and neck supple. Right lower leg: No edema. Left lower leg: No edema. Lymphadenopathy:      Cervical: No cervical adenopathy. Skin:     General: Skin is warm and dry. Coloration: Skin is not jaundiced or pale.    Neurological: General: No focal deficit present. Mental Status: She is alert and oriented to person, place, and time. Mental status is at baseline. Cranial Nerves: No cranial nerve deficit. Sensory: No sensory deficit. Psychiatric:         Mood and Affect: Mood normal.         Behavior: Behavior normal.         Thought Content:  Thought content normal.         Judgment: Judgment normal.       Jd Nelson, DO

## 2023-08-28 ENCOUNTER — CLINICAL SUPPORT (OUTPATIENT)
Dept: FAMILY MEDICINE CLINIC | Facility: CLINIC | Age: 58
End: 2023-08-28
Payer: COMMERCIAL

## 2023-08-28 ENCOUNTER — TELEPHONE (OUTPATIENT)
Dept: FAMILY MEDICINE CLINIC | Facility: CLINIC | Age: 58
End: 2023-08-28

## 2023-08-28 DIAGNOSIS — E53.8 VITAMIN B12 DEFICIENCY: Primary | ICD-10-CM

## 2023-08-28 PROCEDURE — 96372 THER/PROPH/DIAG INJ SC/IM: CPT | Performed by: INTERNAL MEDICINE

## 2023-08-28 RX ADMIN — CYANOCOBALAMIN 1000 MCG: 1000 INJECTION, SOLUTION INTRAMUSCULAR; SUBCUTANEOUS at 09:54

## 2023-08-28 NOTE — TELEPHONE ENCOUNTER
Pt came in for her B12 this morning, said she would like to come in sometime soon for an appt so she could talk to you. Said her nerves have been shot recently due to her grandkids. Also, said she doesn't think the B12 injections are doing anything for her. Please advise.

## 2023-08-29 DIAGNOSIS — F41.9 ANXIETY: ICD-10-CM

## 2023-08-29 RX ORDER — ALPRAZOLAM 0.5 MG/1
0.5 TABLET ORAL 2 TIMES DAILY PRN
Qty: 60 TABLET | Refills: 0 | Status: SHIPPED | OUTPATIENT
Start: 2023-08-29 | End: 2023-09-28

## 2023-09-08 ENCOUNTER — TELEPHONE (OUTPATIENT)
Dept: FAMILY MEDICINE CLINIC | Facility: CLINIC | Age: 58
End: 2023-09-08

## 2023-09-08 DIAGNOSIS — J06.9 UPPER RESPIRATORY TRACT INFECTION, UNSPECIFIED TYPE: Primary | ICD-10-CM

## 2023-09-08 RX ORDER — AMOXICILLIN 500 MG/1
500 CAPSULE ORAL EVERY 8 HOURS SCHEDULED
Qty: 21 CAPSULE | Refills: 0 | Status: SHIPPED | OUTPATIENT
Start: 2023-09-08 | End: 2023-09-15

## 2023-09-08 NOTE — TELEPHONE ENCOUNTER
She should get a home covid test as sxs sound suspicious   I sent antibiotic but if worsening sxs would go to care now over the weekend for evlaution

## 2023-09-08 NOTE — TELEPHONE ENCOUNTER
C/o headache, fatigue, fever of 101, sore throat. Wanted to know if you can prescribe something for her? I asked her if she covid tested, she does not have a kit at home.

## 2023-09-12 DIAGNOSIS — F41.9 ANXIETY: ICD-10-CM

## 2023-09-12 RX ORDER — HYDROXYZINE HYDROCHLORIDE 10 MG/1
10 TABLET, FILM COATED ORAL EVERY 8 HOURS PRN
Qty: 60 TABLET | Refills: 0 | Status: SHIPPED | OUTPATIENT
Start: 2023-09-12

## 2023-09-20 ENCOUNTER — TELEPHONE (OUTPATIENT)
Dept: FAMILY MEDICINE CLINIC | Facility: CLINIC | Age: 58
End: 2023-09-20

## 2023-09-20 DIAGNOSIS — K12.1 MOUTH ULCER: Primary | ICD-10-CM

## 2023-09-20 RX ORDER — VALACYCLOVIR HYDROCHLORIDE 1 G/1
1000 TABLET, FILM COATED ORAL 2 TIMES DAILY
Qty: 14 TABLET | Refills: 0 | Status: SHIPPED | OUTPATIENT
Start: 2023-09-20 | End: 2023-09-27

## 2023-09-28 ENCOUNTER — CLINICAL SUPPORT (OUTPATIENT)
Dept: FAMILY MEDICINE CLINIC | Facility: CLINIC | Age: 58
End: 2023-09-28
Payer: COMMERCIAL

## 2023-09-28 DIAGNOSIS — F41.9 ANXIETY: ICD-10-CM

## 2023-09-28 DIAGNOSIS — E53.8 VITAMIN B12 DEFICIENCY: Primary | ICD-10-CM

## 2023-09-28 PROCEDURE — 96372 THER/PROPH/DIAG INJ SC/IM: CPT | Performed by: INTERNAL MEDICINE

## 2023-09-28 RX ORDER — ALPRAZOLAM 0.5 MG/1
0.5 TABLET ORAL 2 TIMES DAILY PRN
Qty: 60 TABLET | Refills: 0 | Status: SHIPPED | OUTPATIENT
Start: 2023-09-28 | End: 2023-10-28

## 2023-09-28 RX ADMIN — CYANOCOBALAMIN 1000 MCG: 1000 INJECTION, SOLUTION INTRAMUSCULAR; SUBCUTANEOUS at 10:21

## 2023-10-06 DIAGNOSIS — J32.9 RECURRENT SINUSITIS: ICD-10-CM

## 2023-10-06 RX ORDER — LEVOCETIRIZINE DIHYDROCHLORIDE 5 MG/1
5 TABLET, FILM COATED ORAL EVERY EVENING
Qty: 90 TABLET | Refills: 3 | Status: SHIPPED | OUTPATIENT
Start: 2023-10-06

## 2023-10-25 DIAGNOSIS — F41.9 ANXIETY: ICD-10-CM

## 2023-10-25 RX ORDER — HYDROXYZINE HYDROCHLORIDE 10 MG/1
10 TABLET, FILM COATED ORAL EVERY 8 HOURS PRN
Qty: 60 TABLET | Refills: 0 | Status: SHIPPED | OUTPATIENT
Start: 2023-10-25

## 2023-10-25 RX ORDER — ALPRAZOLAM 0.5 MG/1
0.5 TABLET ORAL 2 TIMES DAILY PRN
Qty: 60 TABLET | Refills: 0 | Status: SHIPPED | OUTPATIENT
Start: 2023-10-27 | End: 2023-10-26 | Stop reason: SDUPTHER

## 2023-10-26 DIAGNOSIS — F41.9 ANXIETY: ICD-10-CM

## 2023-10-26 RX ORDER — ALPRAZOLAM 0.5 MG/1
0.5 TABLET ORAL 2 TIMES DAILY PRN
Qty: 60 TABLET | Refills: 0 | Status: SHIPPED | OUTPATIENT
Start: 2023-10-27 | End: 2023-11-26

## 2023-10-27 ENCOUNTER — OFFICE VISIT (OUTPATIENT)
Dept: FAMILY MEDICINE CLINIC | Facility: CLINIC | Age: 58
End: 2023-10-27
Payer: COMMERCIAL

## 2023-10-27 VITALS
RESPIRATION RATE: 18 BRPM | TEMPERATURE: 97.2 F | BODY MASS INDEX: 33.31 KG/M2 | WEIGHT: 188 LBS | SYSTOLIC BLOOD PRESSURE: 128 MMHG | DIASTOLIC BLOOD PRESSURE: 70 MMHG | OXYGEN SATURATION: 95 % | HEIGHT: 63 IN | HEART RATE: 85 BPM

## 2023-10-27 DIAGNOSIS — Z12.2 ENCOUNTER FOR SCREENING FOR LUNG CANCER: ICD-10-CM

## 2023-10-27 DIAGNOSIS — J43.9 PULMONARY EMPHYSEMA, UNSPECIFIED EMPHYSEMA TYPE (HCC): Primary | ICD-10-CM

## 2023-10-27 DIAGNOSIS — Z12.31 SCREENING MAMMOGRAM FOR BREAST CANCER: ICD-10-CM

## 2023-10-27 DIAGNOSIS — J30.1 NON-SEASONAL ALLERGIC RHINITIS DUE TO POLLEN: ICD-10-CM

## 2023-10-27 DIAGNOSIS — I10 PRIMARY HYPERTENSION: ICD-10-CM

## 2023-10-27 DIAGNOSIS — E53.8 VITAMIN B12 DEFICIENCY: ICD-10-CM

## 2023-10-27 PROBLEM — R53.83 FATIGUE: Status: RESOLVED | Noted: 2020-07-08 | Resolved: 2023-10-27

## 2023-10-27 PROCEDURE — 99214 OFFICE O/P EST MOD 30 MIN: CPT | Performed by: INTERNAL MEDICINE

## 2023-10-27 RX ADMIN — CYANOCOBALAMIN 1000 MCG: 1000 INJECTION, SOLUTION INTRAMUSCULAR; SUBCUTANEOUS at 13:37

## 2023-10-27 NOTE — PROGRESS NOTES
Name: Diana Vassar      : 1965      MRN: [id number]  Encounter Provider: Derick Romero DO  Encounter Date: 10/27/2023   Encounter department: [address]     1. Pulmonary emphysema, unspecified emphysema type ([address])  Sxs stable  Encouraged smoking cessation   Repeat Ct lung for screening     2. Non-seasonal allergic rhinitis due to pollen  Pt has followup with Dr Chelsea Rothman     3. Primary hypertension    4. Screening mammogram for breast cancer  -     Mammo screening bilateral w 3d & cad; Future; Expected date: 10/27/2023    5. Encounter for screening for lung cancer  -     CT lung screening program; Future; Expected date: 10/27/2023        Depression Screening and Follow-up Plan: Patient was screened for depression during today's encounter. They screened negative with a PHQ-2 score of 0. Tobacco Cessation Counseling: Tobacco cessation counseling was provided. The patient is sincerely urged to quit consumption of tobacco. She is not ready to quit tobacco.       Rto 3months   Subjective      HPI  Pt overall more settled Her son and grandchildren moved out recently so house is more calm now and she feels less stressed Still struggling with allergy/sinus sxs Still smoking No cough or sob She does want to schedule Mammogram and repeat CT lung No recent acute illness She is happy painting her house and doing crafts   Review of Systems   Constitutional:  Negative for chills and fever. Patient keeping busy painting her house and doing woodwork   HENT:  Positive for congestion and sinus pressure. Respiratory:  Negative for cough and shortness of breath. Cardiovascular:  Negative for chest pain. Gastrointestinal: Negative. Genitourinary: Negative. Musculoskeletal:  Positive for arthralgias. Neurological: Negative. Psychiatric/Behavioral:  Negative for sleep disturbance. The patient is not nervous/anxious.          Much calmer and happier today        Current Outpatient Medications on File Prior to Visit   Medication Sig    albuterol (PROVENTIL HFA,VENTOLIN HFA) 90 mcg/act inhaler Inhale 2 puffs every 6 (six) hours as needed for wheezing    ALPRAZolam (XANAX) 0.5 mg tablet Take 1 tablet (0.5 mg total) by mouth 2 (two) times a day as needed for anxiety Do not start before October 27, 2023.     aspirin 325 mg tablet Take 325 mg by mouth daily    azelastine (OPTIVAR) 0.05 % ophthalmic solution Administer 1 drop to both eyes in the morning    B Complex Vitamins (Vitamin B Complex) TABS Take 1 tablet by mouth daily    Cetirizine HCl 10 MG CAPS Take 1 capsule (10 mg total) by mouth daily    citalopram (CeleXA) 20 mg tablet Take 1 tablet (20 mg total) by mouth daily    ergocalciferol (VITAMIN D2) 50,000 units Take 1 capsule (50,000 Units total) by mouth once a week    ezetimibe (ZETIA) 10 mg tablet Take 1 tablet (10 mg total) by mouth daily    fexofenadine-pseudoephedrine (ALLEGRA-D)  MG per tablet Take 1 tablet by mouth 2 (two) times a day    fluticasone (FLONASE) 50 mcg/act nasal spray 1 spray into each nostril daily    hydrOXYzine HCL (ATARAX) 10 mg tablet Take 1 tablet (10 mg total) by mouth every 8 (eight) hours as needed for itching or anxiety    ipratropium (ATROVENT) 0.06 % nasal spray instill 2 sprays into each nostril twice a day    levocetirizine (XYZAL) 5 MG tablet Take 1 tablet (5 mg total) by mouth every evening    metoprolol tartrate (LOPRESSOR) 50 mg tablet Take 1 tablet (50 mg total) by mouth every 12 (twelve) hours    montelukast (SINGULAIR) 10 mg tablet Take 1 tablet (10 mg total) by mouth daily    Naproxen Sodium 220 MG CAPS Take 2 capsules by mouth if needed    sulfamethoxazole-trimethoprim (BACTRIM DS) 800-160 mg per tablet take 1 tablet by mouth every morning and BEFORE BEDTIME    valACYclovir (VALTREX) 1,000 mg tablet Take 1 tablet (1,000 mg total) by mouth 2 (two) times a day for 7 days       Objective     /70   Pulse 85   Temp (!) 97.2 °F (36.2 °C) (Temporal)   Resp 18   Ht 5' 3" (1.6 m)   Wt 85.3 kg (188 lb)   SpO2 95%   BMI 33.30 kg/m²     Physical Exam  Vitals and nursing note reviewed. Constitutional:       General: She is not in acute distress. Appearance: Normal appearance. She is not ill-appearing, toxic-appearing or diaphoretic. HENT:      Head: Normocephalic and atraumatic. Right Ear: External ear normal.      Left Ear: External ear normal.      Nose: Nose normal.      Mouth/Throat:      Mouth: Mucous membranes are moist.   Eyes:      General: No scleral icterus. Extraocular Movements: Extraocular movements intact. Conjunctiva/sclera: Conjunctivae normal.      Pupils: Pupils are equal, round, and reactive to light. Cardiovascular:      Rate and Rhythm: Normal rate and regular rhythm. Pulses: Normal pulses. Heart sounds: Normal heart sounds. Pulmonary:      Effort: Pulmonary effort is normal. No respiratory distress. Breath sounds: Normal breath sounds. No wheezing. Abdominal:      General: Bowel sounds are normal. There is no distension. Palpations: Abdomen is soft. Tenderness: There is no abdominal tenderness. Musculoskeletal:      Cervical back: Normal range of motion and neck supple. Right lower leg: No edema. Left lower leg: No edema. Lymphadenopathy:      Cervical: No cervical adenopathy. Skin:     General: Skin is warm and dry. Coloration: Skin is not jaundiced or pale. Neurological:      General: No focal deficit present. Mental Status: She is alert and oriented to person, place, and time. Mental status is at baseline. Psychiatric:         Mood and Affect: Mood normal.         Behavior: Behavior normal.         Thought Content:  Thought content normal.         Judgment: Judgment normal.       Nancy Coto DO

## 2023-11-06 ENCOUNTER — TELEPHONE (OUTPATIENT)
Dept: FAMILY MEDICINE CLINIC | Facility: CLINIC | Age: 58
End: 2023-11-06

## 2023-11-06 DIAGNOSIS — K12.1 MOUTH ULCER: ICD-10-CM

## 2023-11-06 RX ORDER — VALACYCLOVIR HYDROCHLORIDE 1 G/1
1000 TABLET, FILM COATED ORAL 2 TIMES DAILY
Qty: 14 TABLET | Refills: 2 | Status: SHIPPED | OUTPATIENT
Start: 2023-11-06 | End: 2023-11-27

## 2023-11-06 NOTE — TELEPHONE ENCOUNTER
C/o another cold sore, wanted to know if you can Rx something and if you would add a refill since she is prone to getting them?

## 2023-11-08 ENCOUNTER — TELEPHONE (OUTPATIENT)
Dept: FAMILY MEDICINE CLINIC | Facility: CLINIC | Age: 58
End: 2023-11-08

## 2023-11-08 ENCOUNTER — APPOINTMENT (OUTPATIENT)
Dept: LAB | Facility: MEDICAL CENTER | Age: 58
End: 2023-11-08
Payer: COMMERCIAL

## 2023-11-08 DIAGNOSIS — D58.2 ELEVATED HEMOGLOBIN (HCC): ICD-10-CM

## 2023-11-08 DIAGNOSIS — D58.2 ELEVATED HEMOGLOBIN (HCC): Primary | ICD-10-CM

## 2023-11-08 LAB
BASOPHILS # BLD AUTO: 0.09 THOUSANDS/ÂΜL (ref 0–0.1)
BASOPHILS NFR BLD AUTO: 1 % (ref 0–1)
EOSINOPHIL # BLD AUTO: 0.29 THOUSAND/ÂΜL (ref 0–0.61)
EOSINOPHIL NFR BLD AUTO: 3 % (ref 0–6)
ERYTHROCYTE [DISTWIDTH] IN BLOOD BY AUTOMATED COUNT: 13 % (ref 11.6–15.1)
HCT VFR BLD AUTO: 49.9 % (ref 34.8–46.1)
HGB BLD-MCNC: 15.9 G/DL (ref 11.5–15.4)
IMM GRANULOCYTES # BLD AUTO: 0.04 THOUSAND/UL (ref 0–0.2)
IMM GRANULOCYTES NFR BLD AUTO: 0 % (ref 0–2)
LYMPHOCYTES # BLD AUTO: 4.1 THOUSANDS/ÂΜL (ref 0.6–4.47)
LYMPHOCYTES NFR BLD AUTO: 37 % (ref 14–44)
MCH RBC QN AUTO: 29 PG (ref 26.8–34.3)
MCHC RBC AUTO-ENTMCNC: 31.9 G/DL (ref 31.4–37.4)
MCV RBC AUTO: 91 FL (ref 82–98)
MONOCYTES # BLD AUTO: 0.7 THOUSAND/ÂΜL (ref 0.17–1.22)
MONOCYTES NFR BLD AUTO: 6 % (ref 4–12)
NEUTROPHILS # BLD AUTO: 5.97 THOUSANDS/ÂΜL (ref 1.85–7.62)
NEUTS SEG NFR BLD AUTO: 53 % (ref 43–75)
NRBC BLD AUTO-RTO: 0 /100 WBCS
PLATELET # BLD AUTO: 299 THOUSANDS/UL (ref 149–390)
PMV BLD AUTO: 11.6 FL (ref 8.9–12.7)
RBC # BLD AUTO: 5.48 MILLION/UL (ref 3.81–5.12)
WBC # BLD AUTO: 11.19 THOUSAND/UL (ref 4.31–10.16)

## 2023-11-08 PROCEDURE — 36415 COLL VENOUS BLD VENIPUNCTURE: CPT

## 2023-11-08 PROCEDURE — 85025 COMPLETE CBC W/AUTO DIFF WBC: CPT

## 2023-11-08 NOTE — TELEPHONE ENCOUNTER
Pt called concerned because she is very achy and she is worried that her white blood count may be to high,      She is wondering if she needs her blood count done, she states she periodically gets her blood count checked and some times has to " get blood taken out"     Please advice

## 2023-11-09 DIAGNOSIS — D75.0 BENIGN POLYCYTHEMIA: ICD-10-CM

## 2023-11-09 DIAGNOSIS — D58.2 ELEVATED HEMOGLOBIN (HCC): ICD-10-CM

## 2023-11-09 DIAGNOSIS — D72.829 LEUKOCYTOSIS, UNSPECIFIED TYPE: Primary | ICD-10-CM

## 2023-11-13 ENCOUNTER — HOSPITAL ENCOUNTER (OUTPATIENT)
Dept: MAMMOGRAPHY | Facility: HOSPITAL | Age: 58
Discharge: HOME | End: 2023-11-13
Attending: INTERNAL MEDICINE
Payer: COMMERCIAL

## 2023-11-13 ENCOUNTER — HOSPITAL ENCOUNTER (OUTPATIENT)
Dept: CT IMAGING | Facility: HOSPITAL | Age: 58
Discharge: HOME | End: 2023-11-13
Attending: INTERNAL MEDICINE
Payer: COMMERCIAL

## 2023-11-13 VITALS — BODY MASS INDEX: 36.91 KG/M2 | WEIGHT: 188 LBS | HEIGHT: 60 IN

## 2023-11-13 DIAGNOSIS — Z12.31 SCREENING MAMMOGRAM FOR BREAST CANCER: ICD-10-CM

## 2023-11-13 DIAGNOSIS — Z12.2 ENCOUNTER FOR SCREENING FOR LUNG CANCER: ICD-10-CM

## 2023-11-13 PROCEDURE — 77063 BREAST TOMOSYNTHESIS BI: CPT

## 2023-11-13 PROCEDURE — 77067 SCR MAMMO BI INCL CAD: CPT

## 2023-11-13 PROCEDURE — 71271 CT THORAX LUNG CANCER SCR C-: CPT

## 2023-11-14 ENCOUNTER — TELEPHONE (OUTPATIENT)
Dept: HEMATOLOGY ONCOLOGY | Facility: CLINIC | Age: 58
End: 2023-11-14

## 2023-11-14 NOTE — TELEPHONE ENCOUNTER
Appointment Schedule   Who are you speaking with? Patient   If it is not the patient, are they listed on an active communication consent form? N/A   Which provider is the appointment scheduled with? KARLA Ruff   At which location is the appointment scheduled for? [facility]   When is the appointment scheduled? Please list date and time 12/26/23 @ 9am   What is the reason for this appointment? New patient appt   Did patient voice understanding of the details of this appointment? yes   Was the no show policy reviewed with patient?  yes

## 2023-11-14 NOTE — TELEPHONE ENCOUNTER
I called Сергей Bello in response to a referral that was received for patient to establish care with Hematology. Outreach was made to schedule a consultation. Patient does not have a voicemail set up. Another attempt will be made to contact patient.

## 2023-11-27 ENCOUNTER — CLINICAL SUPPORT (OUTPATIENT)
Dept: FAMILY MEDICINE CLINIC | Facility: CLINIC | Age: 58
End: 2023-11-27
Payer: COMMERCIAL

## 2023-11-27 ENCOUNTER — TELEPHONE (OUTPATIENT)
Dept: FAMILY MEDICINE CLINIC | Facility: CLINIC | Age: 58
End: 2023-11-27

## 2023-11-27 DIAGNOSIS — E53.8 VITAMIN B 12 DEFICIENCY: Primary | ICD-10-CM

## 2023-11-27 DIAGNOSIS — F41.9 ANXIETY: ICD-10-CM

## 2023-11-27 DIAGNOSIS — D51.0 PERNICIOUS ANEMIA: Primary | ICD-10-CM

## 2023-11-27 PROCEDURE — 96372 THER/PROPH/DIAG INJ SC/IM: CPT | Performed by: INTERNAL MEDICINE

## 2023-11-27 RX ORDER — ALPRAZOLAM 0.5 MG/1
0.5 TABLET ORAL 2 TIMES DAILY PRN
Qty: 60 TABLET | Refills: 0 | Status: SHIPPED | OUTPATIENT
Start: 2023-11-27 | End: 2023-12-27

## 2023-11-27 RX ADMIN — CYANOCOBALAMIN 1000 MCG: 1000 INJECTION, SOLUTION INTRAMUSCULAR; SUBCUTANEOUS at 10:02

## 2023-11-27 NOTE — TELEPHONE ENCOUNTER
Pt made aware of recommendations and rx being sent. Pt wondering if she should continue B12 injection since doesn't feel like they are working. Please advise.

## 2023-11-27 NOTE — TELEPHONE ENCOUNTER
Pt came into office for B12 shot this morning. Pt stated these shots are not doing anything for her. Was asking if she should continue. Also pt stated she is now going to [facility] for PV rash, pt stated the doctor wants to know why there was no blood taken out to even out her red and white blood cells. Pt would like alprazolam refilled. Please advise.

## 2023-11-28 ENCOUNTER — APPOINTMENT (OUTPATIENT)
Dept: LAB | Facility: MEDICAL CENTER | Age: 58
End: 2023-11-28
Payer: COMMERCIAL

## 2023-11-28 DIAGNOSIS — D51.0 PERNICIOUS ANEMIA: ICD-10-CM

## 2023-11-28 LAB — VIT B12 SERPL-MCNC: 1158 PG/ML (ref 180–914)

## 2023-11-28 PROCEDURE — 36415 COLL VENOUS BLD VENIPUNCTURE: CPT

## 2023-11-28 PROCEDURE — 82607 VITAMIN B-12: CPT

## 2023-12-04 ENCOUNTER — VBI (OUTPATIENT)
Dept: ADMINISTRATIVE | Facility: OTHER | Age: 58
End: 2023-12-04

## 2023-12-06 ENCOUNTER — TELEPHONE (OUTPATIENT)
Dept: FAMILY MEDICINE CLINIC | Facility: CLINIC | Age: 58
End: 2023-12-06

## 2023-12-06 DIAGNOSIS — J01.00 ACUTE MAXILLARY SINUSITIS, RECURRENCE NOT SPECIFIED: Primary | ICD-10-CM

## 2023-12-06 RX ORDER — AMOXICILLIN AND CLAVULANATE POTASSIUM 875; 125 MG/1; MG/1
1 TABLET, FILM COATED ORAL 2 TIMES DAILY
Qty: 14 TABLET | Refills: 0 | Status: SHIPPED | OUTPATIENT
Start: 2023-12-06 | End: 2023-12-13

## 2023-12-06 RX ORDER — FLUCONAZOLE 150 MG/1
150 TABLET ORAL ONCE
Qty: 1 TABLET | Refills: 0 | Status: SHIPPED | OUTPATIENT
Start: 2023-12-06 | End: 2023-12-06

## 2023-12-06 NOTE — TELEPHONE ENCOUNTER
Scheduled for surgery Monday w/ ENT    Woke up with sore throat & sinusitis - Home COVID testing Neg (-)    Asking for antibiotic    Allergy: Lorazepam    Pharm: [facility]

## 2023-12-08 ENCOUNTER — ANESTHESIA EVENT (OUTPATIENT)
Dept: PERIOP | Facility: HOSPITAL | Age: 58
End: 2023-12-08
Payer: COMMERCIAL

## 2023-12-08 NOTE — NURSING NOTE
Called patient to provide information for procedure for Monday. Patient states is currently on antibiotics for "a head cold." States has "about 4 days left of antibiotics. Advised patient to call Dr. Bri Booth office to check if they will proceed with surgery. Patient will call office.

## 2023-12-08 NOTE — ANESTHESIA PREPROCEDURE EVALUATION
Procedure:  MYRINGOTOMY WITH TUBES, WITH POSSIBLE PAPER PATCH TYMPANOPLASTIES (Bilateral: Ear)    Relevant Problems   CARDIO   (+) Hyperlipidemia   (+) Hypertension      GI/HEPATIC   (+) Esophageal reflux      MUSCULOSKELETAL   (+) Diastasis recti      NEURO/PSYCH   (+) Anxiety      PULMONARY   (+) Chronic obstructive airway disease (HCC)      Smoking-80 pk yrs      Physical Exam    Airway    Mallampati score: II  TM Distance: >3 FB  Neck ROM: full     Dental   No notable dental hx     Cardiovascular  Rhythm: regular, Rate: normal    Pulmonary   Breath sounds clear to auscultation    Other Findings  post-pubertal.      Anesthesia Plan  ASA Score- 3     Anesthesia Type- IV sedation with anesthesia with ASA Monitors. Additional Monitors:     Airway Plan:            Plan Factors-Exercise tolerance (METS): >4 METS. Chart reviewed. EKG reviewed. Imaging results reviewed. Existing labs reviewed. Patient summary reviewed. Patient is a current smoker. Patient instructed to abstain from smoking on day of procedure. Patient did not smoke on day of surgery. Obstructive sleep apnea risk education given perioperatively. Induction-     Postoperative Plan-     Informed Consent- Anesthetic plan and risks discussed with patient. I personally reviewed this patient with the CRNA. Discussed and agreed on the Anesthesia Plan with the CRNA. Darylene Pipe

## 2023-12-11 ENCOUNTER — ANESTHESIA (OUTPATIENT)
Dept: PERIOP | Facility: HOSPITAL | Age: 58
End: 2023-12-11
Payer: COMMERCIAL

## 2023-12-11 ENCOUNTER — HOSPITAL ENCOUNTER (OUTPATIENT)
Facility: HOSPITAL | Age: 58
Setting detail: OUTPATIENT SURGERY
Discharge: HOME | End: 2023-12-11
Attending: OTOLARYNGOLOGY | Admitting: OTOLARYNGOLOGY
Payer: COMMERCIAL

## 2023-12-11 VITALS
HEIGHT: 63 IN | WEIGHT: 195 LBS | OXYGEN SATURATION: 92 % | HEART RATE: 71 BPM | BODY MASS INDEX: 34.55 KG/M2 | SYSTOLIC BLOOD PRESSURE: 129 MMHG | DIASTOLIC BLOOD PRESSURE: 56 MMHG | RESPIRATION RATE: 20 BRPM | TEMPERATURE: 98 F

## 2023-12-11 DEVICE — ARMSTRONG BEVELED VENT TUBE GROMMET TYPE 1.14 MM I.D. FLUOROPLASTIC
Type: IMPLANTABLE DEVICE | Status: FUNCTIONAL
Brand: GYRUS ACMI

## 2023-12-11 RX ORDER — SODIUM CHLORIDE, SODIUM LACTATE, POTASSIUM CHLORIDE, CALCIUM CHLORIDE 600; 310; 30; 20 MG/100ML; MG/100ML; MG/100ML; MG/100ML
INJECTION, SOLUTION INTRAVENOUS CONTINUOUS PRN
Status: DISCONTINUED | OUTPATIENT
Start: 2023-12-11 | End: 2023-12-11

## 2023-12-11 RX ORDER — ONDANSETRON 2 MG/ML
INJECTION INTRAMUSCULAR; INTRAVENOUS AS NEEDED
Status: DISCONTINUED | OUTPATIENT
Start: 2023-12-11 | End: 2023-12-11

## 2023-12-11 RX ORDER — LIDOCAINE HYDROCHLORIDE 10 MG/ML
INJECTION, SOLUTION EPIDURAL; INFILTRATION; INTRACAUDAL; PERINEURAL AS NEEDED
Status: DISCONTINUED | OUTPATIENT
Start: 2023-12-11 | End: 2023-12-11

## 2023-12-11 RX ORDER — PROPOFOL 10 MG/ML
INJECTION, EMULSION INTRAVENOUS AS NEEDED
Status: DISCONTINUED | OUTPATIENT
Start: 2023-12-11 | End: 2023-12-11

## 2023-12-11 RX ORDER — MAGNESIUM HYDROXIDE 1200 MG/15ML
LIQUID ORAL AS NEEDED
Status: DISCONTINUED | OUTPATIENT
Start: 2023-12-11 | End: 2023-12-11 | Stop reason: HOSPADM

## 2023-12-11 RX ORDER — ALBUTEROL SULFATE 2.5 MG/3ML
2.5 SOLUTION RESPIRATORY (INHALATION) ONCE
Status: COMPLETED | OUTPATIENT
Start: 2023-12-11 | End: 2023-12-11

## 2023-12-11 RX ORDER — ACETAMINOPHEN 325 MG/1
975 TABLET ORAL ONCE
Status: COMPLETED | OUTPATIENT
Start: 2023-12-11 | End: 2023-12-11

## 2023-12-11 RX ORDER — FENTANYL CITRATE 50 UG/ML
INJECTION, SOLUTION INTRAMUSCULAR; INTRAVENOUS AS NEEDED
Status: DISCONTINUED | OUTPATIENT
Start: 2023-12-11 | End: 2023-12-11

## 2023-12-11 RX ADMIN — PROPOFOL 30 MG: 10 INJECTION, EMULSION INTRAVENOUS at 10:19

## 2023-12-11 RX ADMIN — FENTANYL CITRATE 50 MCG: 50 INJECTION INTRAMUSCULAR; INTRAVENOUS at 10:17

## 2023-12-11 RX ADMIN — PROPOFOL 80 MG: 10 INJECTION, EMULSION INTRAVENOUS at 10:17

## 2023-12-11 RX ADMIN — ALBUTEROL SULFATE 2.5 MG: 2.5 SOLUTION RESPIRATORY (INHALATION) at 09:12

## 2023-12-11 RX ADMIN — ONDANSETRON 4 MG: 2 INJECTION INTRAMUSCULAR; INTRAVENOUS at 10:18

## 2023-12-11 RX ADMIN — SODIUM CHLORIDE, SODIUM LACTATE, POTASSIUM CHLORIDE, AND CALCIUM CHLORIDE: .6; .31; .03; .02 INJECTION, SOLUTION INTRAVENOUS at 10:11

## 2023-12-11 RX ADMIN — LIDOCAINE HYDROCHLORIDE 50 MG: 10 INJECTION, SOLUTION EPIDURAL; INFILTRATION; INTRACAUDAL; PERINEURAL at 10:18

## 2023-12-11 RX ADMIN — SODIUM CHLORIDE 8 MCG: 9 INJECTION, SOLUTION INTRAVENOUS at 10:14

## 2023-12-11 RX ADMIN — ACETAMINOPHEN 975 MG: 325 TABLET, FILM COATED ORAL at 09:11

## 2023-12-11 NOTE — OP NOTE
OPERATIVE REPORT  PATIENT NAME: Michael Dutta    :  1965  MRN: [id number]  Pt Location: [facility] OR ROOM 02    SURGERY DATE: 2023    Surgeon(s) and Role:     * Joan Salgado MD - Primary    Preop Diagnosis:  Chronic serous otitis media, bilateral [H65.23]    Post-Op Diagnosis Codes:     * Chronic serous otitis media, bilateral [H65.23]    Procedure(s):  Bilateral - MYRINGOTOMY WITH TUBES    Specimen(s):  * No specimens in log *    Estimated Blood Loss:   Minimal    Drains:  * No LDAs found *    Anesthesia Type:   IV Sedation with Anesthesia    Operative Indications:  Chronic serous otitis media, bilateral [H65.23]      Operative Findings:  sirisha    Complications:   None    Procedure and Technique:  The patient was identified and taken to the operative suite. A timeout was called. After the successful induction of IV sedation, the patient was prepped and draped in usual fashion. A 4-0 speculum was inserted into the right external auditory canal and microscope was placed into position. Under microscopic visualization, cerumen was debrided and the old tube with a cerumen curette. Using microscopic visualization, an anterior, inferior radial incision was made in the tympanic membrane and a serous effusion was suctioned with a #5 suction. The myringotomy tube was placed. The exact same findings and procedure were performed on the left ear as described on the right. The patient was taken to the PACU in excellent condition. Instrument and sponge counts were correct x 2 at the end of the case. I was present for the entire procedure.     Patient Disposition:  PACU         SIGNATURE: Zoë Joyce MD  DATE: 2023  TIME: 10:00 AM

## 2023-12-11 NOTE — ANESTHESIA POSTPROCEDURE EVALUATION
Post-Op Assessment Note        /59 (12/11/23 1025)    Temp 97.5 °F (36.4 °C) (12/11/23 1025)    Pulse 69 (12/11/23 1025)   Resp 22 (12/11/23 1025)    SpO2 94 % (12/11/23 1025)

## 2023-12-11 NOTE — ANESTHESIA POSTPROCEDURE EVALUATION
Post-Op Assessment Note    CV Status:  Stable  Pain Score: 0    Pain management: adequate       Mental Status:  Awake and sleepy   Hydration Status:  Euvolemic   PONV Controlled:  Controlled   Airway Patency:  Patent     Post Op Vitals Reviewed: Yes      Staff: CRNA               /59 (12/11/23 1025)    Temp 97.5 °F (36.4 °C) (12/11/23 1025)    Pulse 69 (12/11/23 1025)   Resp 22 (12/11/23 1025)    SpO2 94 % (12/11/23 1025)

## 2023-12-11 NOTE — INTERVAL H&P NOTE
H&P reviewed. After examining the patient I find no changes in the patients condition since the H&P had been written.     Vitals:    12/11/23 0901   BP: 125/73   Pulse: 68   Resp: 18   Temp: 97.8 °F (36.6 °C)   SpO2: 96%

## 2023-12-11 NOTE — DISCHARGE INSTR - AVS FIRST PAGE
Ana Mcnamara   [address], [address] [facility], [address]   PHONE: [phone] FAX:  [phone]  EMAIL: [email or web address]  Kaylen Byrne M.D.       DR. Francia Martinez drops are occasionally provided for your use. Current best practice recommendations do not require drops to be used and if there is not an active infection at the time of surgery, drops will not be given. Use Tylenol for any pain. If you are interested in swim plugs, our audiologist can provide custom-made plugs. Call [phone] for information. These must be paid for at the time of ordering. You may also use the earplugs that are available at most drug stores. As always, feel free to call us if you have any questions.

## 2023-12-18 NOTE — TELEPHONE ENCOUNTER
No exposure to covid, she get this way with the weather change  Also would like diflucan called in for a yeast infection Subjective   Sitting in chair eating breakfast comfortably.  Persistent headache.  No new or worsening complaints    Objective     I/O's    Intake/Output Summary (Last 24 hours) at 12/18/2023 0927  Last data filed at 12/18/2023 0800  Gross per 24 hour   Intake 456.7 ml   Output 2750 ml   Net -2293.3 ml       Last Recorded Vitals  Blood pressure (!) 173/89, pulse 95, temperature 97.9 °F (36.6 °C), temperature source Oral, resp. rate (!) 22, height 5' 6\" (1.676 m), weight 76.5 kg (168 lb 10.4 oz), SpO2 97 %.  Body mass index is 27.22 kg/m².    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  Obtain stat if acute neuro status change  Okay for floor status with Q4 neurochecks  Continue Percocet prn for pain  Continue Decadron 2q6  ID on consult, appreciate input  PT/OT/SLP    Okay to discharge from neurosurgery standpoint/transfer to rehab when ID plan in place and cleared by other services.  Outpatient follow-up with the attending in 3 weeks.    Discussed with Dr. Preethi Jason PA-C

## 2023-12-20 DIAGNOSIS — F41.9 ANXIETY: ICD-10-CM

## 2023-12-20 RX ORDER — HYDROXYZINE HYDROCHLORIDE 10 MG/1
10 TABLET, FILM COATED ORAL EVERY 8 HOURS PRN
Qty: 60 TABLET | Refills: 5 | Status: SHIPPED | OUTPATIENT
Start: 2023-12-20

## 2023-12-26 DIAGNOSIS — F41.9 ANXIETY: ICD-10-CM

## 2023-12-26 RX ORDER — ALPRAZOLAM 0.5 MG/1
0.5 TABLET ORAL 2 TIMES DAILY PRN
Qty: 60 TABLET | Refills: 0 | Status: SHIPPED | OUTPATIENT
Start: 2023-12-26 | End: 2024-01-25

## 2024-01-26 DIAGNOSIS — F41.9 ANXIETY: ICD-10-CM

## 2024-01-26 RX ORDER — ALPRAZOLAM 0.5 MG/1
0.5 TABLET ORAL 2 TIMES DAILY PRN
Qty: 60 TABLET | Refills: 0 | Status: SHIPPED | OUTPATIENT
Start: 2024-01-26 | End: 2024-02-25

## 2024-01-30 ENCOUNTER — TELEPHONE (OUTPATIENT)
Dept: FAMILY MEDICINE CLINIC | Facility: CLINIC | Age: 59
End: 2024-01-30

## 2024-01-30 NOTE — TELEPHONE ENCOUNTER
Attempted to reach pt in regards to upcoming appt on 1/31. Seeing if pt can come in at 3:40 instead that same day. No answer/ unable to lvm.

## 2024-01-31 ENCOUNTER — OFFICE VISIT (OUTPATIENT)
Dept: FAMILY MEDICINE CLINIC | Facility: CLINIC | Age: 59
End: 2024-01-31
Payer: COMMERCIAL

## 2024-01-31 VITALS
RESPIRATION RATE: 18 BRPM | OXYGEN SATURATION: 97 % | BODY MASS INDEX: 34.54 KG/M2 | DIASTOLIC BLOOD PRESSURE: 78 MMHG | SYSTOLIC BLOOD PRESSURE: 124 MMHG | HEART RATE: 98 BPM | TEMPERATURE: 97.2 F | HEIGHT: 63 IN

## 2024-01-31 DIAGNOSIS — I10 PRIMARY HYPERTENSION: ICD-10-CM

## 2024-01-31 DIAGNOSIS — J43.9 PULMONARY EMPHYSEMA, UNSPECIFIED EMPHYSEMA TYPE (HCC): ICD-10-CM

## 2024-01-31 DIAGNOSIS — D75.0 BENIGN POLYCYTHEMIA: ICD-10-CM

## 2024-01-31 DIAGNOSIS — J34.2 DEVIATED NASAL SEPTUM: ICD-10-CM

## 2024-01-31 DIAGNOSIS — Z00.00 ANNUAL PHYSICAL EXAM: Primary | ICD-10-CM

## 2024-01-31 PROBLEM — R92.8 ABNORMAL MAMMOGRAM: Status: RESOLVED | Noted: 2020-08-21 | Resolved: 2024-01-31

## 2024-01-31 PROCEDURE — 99396 PREV VISIT EST AGE 40-64: CPT | Performed by: INTERNAL MEDICINE

## 2024-01-31 NOTE — PROGRESS NOTES
ADULT ANNUAL PHYSICAL  [facility] PRIMARY CARE    NAME: Michelle Trevino  AGE: 58 y.o. SEX: female  : 1965     DATE: 2024     Assessment and Plan:     Problem List Items Addressed This Visit          Respiratory    Chronic obstructive airway disease (HCC)    RESOLVED: Deviated nasal septum       Cardiovascular and Mediastinum    Hypertension       Other    Benign polycythemia    Annual physical exam - Primary   Pt has hematology followup this week Smoking cessation encouraged  Stay hydrated   Continue current rx   Defers colon screen  Rto 3months    Immunizations and preventive care screenings were discussed with patient today. Appropriate education was printed on patient's after visit summary.    Counseling:  Exercise: the importance of regular exercise/physical activity was discussed. Recommend exercise 3-5 times per week for at least 30 minutes.       Depression Screening and Follow-up Plan: Patient was screened for depression during today's encounter. They screened negative with a PHQ-2 score of 0.    Tobacco Cessation Counseling: Tobacco cessation counseling was provided. The patient is sincerely urged to quit consumption of tobacco. She is not ready to quit tobacco.         Return in about 3 months (around 2024).     Chief Complaint:     Chief Complaint   Patient presents with    Annual Exam     Blood sugars have been running low recently.       History of Present Illness:     Adult Annual Physical   Patient here for a comprehensive physical exam. The patient reports hematology and has followup this week Had another ear procedure but sxs still present .    Diet and Physical Activity  Diet/Nutrition: well balanced diet.   Exercise: walking.      Depression Screening  PHQ-2/9 Depression Screening    Little interest or pleasure in doing things: 0 - not at all  Feeling down, depressed, or hopeless: 0 - not at all  PHQ-2 Score: 0  PHQ-2 Interpretation: Negative  "depression screen       General Health  Sleep: gets 4-6 hours of sleep on average.   Hearing: has hearing aide   Vision: no vision problems.   Dental: regular dental visits.       /GYN Health  Follows with gynecology? no   Patient is: postmenopausal  Last menstrual period: years  Contraceptive method: menopausal    Advanced Care Planning  Do you have an advanced directive? no  Do you have a durable medical power of ? no     Review of Systems:     Review of Systems   Constitutional:  Positive for fatigue. Negative for chills and fever.   HENT:  Positive for hearing loss and postnasal drip.    Eyes:  Negative for visual disturbance.   Respiratory:  Positive for shortness of breath. Negative for cough.    Cardiovascular:  Negative for chest pain, palpitations and leg swelling.   Gastrointestinal:  Negative for abdominal distention and abdominal pain.   Genitourinary: Negative.    Musculoskeletal:  Positive for arthralgias.   Neurological:  Negative for dizziness, light-headedness and headaches.   Psychiatric/Behavioral:  Negative for sleep disturbance. The patient is not nervous/anxious.       Past Medical History:     Past Medical History:   Diagnosis Date    Age-related cataract of both eyes 10/21/2020    Anxiety     Arthritis     Cancer (HCC)     Chronic serous otitis media, right ear     Deviated septum     Hearing aid worn     left    Hyperlipidemia     Hypertension     Hypertrophy of nasal turbinates     Lung nodule     Mixed conductive and sensorineural hearing loss of both ears     Polycythemia     Shortness of breath     HUMPHREY    Stroke (HCC)     \"mini stroke yrs ago\" \"stress related\"      Past Surgical History:     Past Surgical History:   Procedure Laterality Date    BREAST EXCISIONAL BIOPSY Right     benign    BREAST EXCISIONAL BIOPSY Left     benign    CATARACT EXTRACTION      CATARACT EXTRACTION, BILATERAL      HYSTERECTOMY      UT ADENOIDECTOMY PRIMARY AGE 12/> N/A 10/31/2022    Procedure: " ADENOIDECTOMY;  Surgeon: Douglas Shah MD;  Location: [facility];  Service: ENT    WY NASAL/SINUS NDSC SURG W/NICOLE BULLOSA RESECTION N/A 10/31/2022    Procedure: EXCISION OF LEFT NICOLE BULLOSA, BILATERAL MIDDLE MEATUS ANTROSTOMIES, BILATERAL ANTERIOR ETHMOIDECTOMIES WITH FUSION;  Surgeon: Douglas Shah MD;  [facility];  Service: ENT    WY SEPTOPLASTY/SUBMUCOUS RESECJ W/WO CARTILAGE GRF N/A 10/31/2022    Procedure: SEPTOPLASTY, BILATERAL INFERIOR TURBINATE COBLATION;  Surgeon: Douglas Shah MD;  [facility]: [facility];  Service: ENT    WY TYMPANOSTOMY GENERAL ANESTHESIA Bilateral 10/31/2022    Procedure: MYRINGOTOMY WITH TUBES;  Surgeon: Douglas Shah MD;  [facility];  Service: ENT    WY TYMPANOSTOMY GENERAL ANESTHESIA Bilateral 12/11/2023    Procedure: MYRINGOTOMY WITH TUBES;  Surgeon: Douglas Shah MD;  [facility];  Service: ENT    TUBAL LIGATION        Social History:     Social History     Socioeconomic History    Marital status: /Civil Union     Spouse name: None    Number of children: None    Years of education: None    Highest education level: None   Occupational History    None   Tobacco Use    Smoking status: Every Day     Current packs/day: 2.00     Average packs/day: 2.0 packs/day for 40.0 years (80.0 ttl pk-yrs)     Types: Cigarettes    Smokeless tobacco: Never   Vaping Use    Vaping status: Never Used   Substance and Sexual Activity    Alcohol use: Not Currently     Comment: 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    Drug use: No    Sexual activity: Not Currently     Partners: Male     Birth control/protection: Female Sterilization   Other Topics Concern    None   Social History Narrative    Dental care, occasionally    Lives independently with spouse    No living will    Sun protection - sunscreen    Uses safety equipment - seatbelts     Social Determinants of Health     Financial Resource Strain: Not on file   Food Insecurity: Not on file   Transportation Needs: Not on file   Physical  Activity: Not on file   Stress: Not on file   Social Connections: Not on file   Intimate Partner Violence: Not on file   Housing Stability: Not on file      Family History:     Family History   Problem Relation Age of Onset    Hypertension Mother     Stroke Mother     Heart disease Mother     Parkinsonism Mother     Dementia Mother     Coronary artery disease Father     Glaucoma Father     Hypertension Father     Stroke Father     Diabetes Sister     Heart disease Sister     Lung disease Sister     Lung cancer Sister     Diabetes Daughter     Cancer Maternal Grandmother         unknown origin     Skin cancer Maternal Grandfather     No Known Problems Paternal Grandmother     No Known Problems Paternal Grandfather     Bone cancer Sister     Heart disease Sister     Deep vein thrombosis Sister     No Known Problems Sister     No Known Problems Sister     No Known Problems Sister     No Known Problems Maternal Aunt     No Known Problems Maternal Aunt     No Known Problems Paternal Aunt     No Known Problems Paternal Aunt     No Known Problems Paternal Aunt     No Known Problems Paternal Aunt     No Known Problems Paternal Aunt     No Known Problems Paternal Aunt     No Known Problems Paternal Aunt     No Known Problems Paternal Aunt     No Known Problems Paternal Aunt     No Known Problems Paternal Aunt     Liver disease Brother     Lung disease Brother     Diabetes Son       Current Medications:     Current Outpatient Medications   Medication Sig Dispense Refill    albuterol (PROVENTIL HFA,VENTOLIN HFA) 90 mcg/act inhaler Inhale 2 puffs every 6 (six) hours as needed for wheezing 8 g 3    ALPRAZolam (XANAX) 0.5 mg tablet Take 1 tablet (0.5 mg total) by mouth 2 (two) times a day as needed for anxiety 60 tablet 0    aspirin 325 mg tablet Take 81 mg by mouth daily      azelastine (OPTIVAR) 0.05 % ophthalmic solution Administer 1 drop to both eyes in the morning      Cetirizine HCl 10 MG CAPS Take 1 capsule (10 mg total)  "by mouth daily 30 capsule 0    citalopram (CeleXA) 20 mg tablet Take 1 tablet (20 mg total) by mouth daily 90 tablet 3    ergocalciferol (VITAMIN D2) 50,000 units Take 1 capsule (50,000 Units total) by mouth once a week 12 capsule 3    ezetimibe (ZETIA) 10 mg tablet Take 1 tablet (10 mg total) by mouth daily 90 tablet 3    fluticasone (FLONASE) 50 mcg/act nasal spray 1 spray into each nostril daily      hydrOXYzine HCL (ATARAX) 10 mg tablet Take 1 tablet (10 mg total) by mouth every 8 (eight) hours as needed for itching or anxiety 60 tablet 5    ipratropium (ATROVENT) 0.06 % nasal spray instill 2 sprays into each nostril twice a day      levocetirizine (XYZAL) 5 MG tablet Take 1 tablet (5 mg total) by mouth every evening 90 tablet 3    metoprolol tartrate (LOPRESSOR) 50 mg tablet Take 1 tablet (50 mg total) by mouth every 12 (twelve) hours 180 tablet 3    montelukast (SINGULAIR) 10 mg tablet Take 1 tablet (10 mg total) by mouth daily 90 tablet 3    valACYclovir (VALTREX) 1,000 mg tablet Take 1 tablet (1,000 mg total) by mouth 2 (two) times a day for 21 days (Patient taking differently: Take 1,000 mg by mouth if needed) 14 tablet 2     Current Facility-Administered Medications   Medication Dose Route Frequency Provider Last Rate Last Admin    cyanocobalamin injection 1,000 mcg  1,000 mcg Intramuscular Q30 Days Stacey Mckeon DO   1,000 mcg at 11/27/23 1002      Allergies:     Allergies   Allergen Reactions    Lorazepam Itching     Pt reports all over the body      Physical Exam:     /78   Pulse 98   Temp (!) 97.2 °F (36.2 °C) (Temporal)   Resp 18   Ht 5' 3\" (1.6 m)   SpO2 97%   BMI 34.54 kg/m²     Physical Exam  Vitals and nursing note reviewed.   Constitutional:       General: She is not in acute distress.     Appearance: Normal appearance. She is not ill-appearing, toxic-appearing or diaphoretic.   HENT:      Head: Normocephalic and atraumatic.      Right Ear: External ear normal.      Left Ear: " External ear normal.      Nose: Nose normal.      Mouth/Throat:      Mouth: Mucous membranes are moist.   Eyes:      General: No scleral icterus.  Cardiovascular:      Rate and Rhythm: Normal rate and regular rhythm.      Pulses: Normal pulses.      Heart sounds: Normal heart sounds.   Pulmonary:      Effort: Pulmonary effort is normal. No respiratory distress.      Breath sounds: Normal breath sounds. No wheezing.   Abdominal:      General: Bowel sounds are normal. There is no distension.      Palpations: Abdomen is soft.      Tenderness: There is no abdominal tenderness.   Musculoskeletal:      Cervical back: Normal range of motion and neck supple.      Right lower leg: No edema.      Left lower leg: No edema.   Lymphadenopathy:      Cervical: No cervical adenopathy.   Skin:     General: Skin is warm and dry.      Coloration: Skin is not jaundiced.   Neurological:      General: No focal deficit present.      Mental Status: She is alert and oriented to person, place, and time. Mental status is at baseline.      Cranial Nerves: No cranial nerve deficit.      Motor: No weakness.   Psychiatric:         Mood and Affect: Mood normal.         Behavior: Behavior normal.         Thought Content: Thought content normal.         Judgment: Judgment normal.          Stacey Mckeon [facility] PRIMARY [facility]

## 2024-02-21 ENCOUNTER — TELEPHONE (OUTPATIENT)
Dept: FAMILY MEDICINE CLINIC | Facility: CLINIC | Age: 59
End: 2024-02-21

## 2024-02-21 DIAGNOSIS — L08.9 SKIN INFECTION: Primary | ICD-10-CM

## 2024-02-21 RX ORDER — FLUCONAZOLE 150 MG/1
150 TABLET ORAL ONCE
Qty: 1 TABLET | Refills: 0 | Status: SHIPPED | OUTPATIENT
Start: 2024-02-21 | End: 2024-02-21

## 2024-02-21 RX ORDER — CEPHALEXIN 500 MG/1
500 CAPSULE ORAL EVERY 8 HOURS SCHEDULED
Qty: 21 CAPSULE | Refills: 0 | Status: SHIPPED | OUTPATIENT
Start: 2024-02-21 | End: 2024-02-28

## 2024-02-22 DIAGNOSIS — F41.9 ANXIETY: ICD-10-CM

## 2024-02-22 RX ORDER — ALPRAZOLAM 0.5 MG/1
0.5 TABLET ORAL 2 TIMES DAILY PRN
Qty: 60 TABLET | Refills: 0 | OUTPATIENT
Start: 2024-02-22 | End: 2024-03-23

## 2024-02-26 DIAGNOSIS — F41.9 ANXIETY: ICD-10-CM

## 2024-02-26 RX ORDER — ALPRAZOLAM 0.5 MG/1
0.5 TABLET ORAL 2 TIMES DAILY PRN
Qty: 60 TABLET | Refills: 0 | Status: SHIPPED | OUTPATIENT
Start: 2024-02-26 | End: 2024-03-27

## 2024-03-28 DIAGNOSIS — F41.9 ANXIETY: ICD-10-CM

## 2024-03-28 RX ORDER — ALPRAZOLAM 0.5 MG/1
0.5 TABLET ORAL 2 TIMES DAILY PRN
Qty: 60 TABLET | Refills: 0 | Status: SHIPPED | OUTPATIENT
Start: 2024-03-28 | End: 2024-04-27

## 2024-04-12 ENCOUNTER — TELEPHONE (OUTPATIENT)
Dept: FAMILY MEDICINE CLINIC | Facility: CLINIC | Age: 59
End: 2024-04-12

## 2024-04-12 DIAGNOSIS — J06.9 UPPER RESPIRATORY TRACT INFECTION, UNSPECIFIED TYPE: Primary | ICD-10-CM

## 2024-04-12 RX ORDER — FLUCONAZOLE 150 MG/1
150 TABLET ORAL ONCE
Qty: 1 TABLET | Refills: 0 | Status: SHIPPED | OUTPATIENT
Start: 2024-04-12 | End: 2024-04-12

## 2024-04-12 RX ORDER — AMOXICILLIN AND CLAVULANATE POTASSIUM 875; 125 MG/1; MG/1
1 TABLET, FILM COATED ORAL 2 TIMES DAILY
Qty: 14 TABLET | Refills: 0 | Status: SHIPPED | OUTPATIENT
Start: 2024-04-12 | End: 2024-04-19

## 2024-04-12 NOTE — TELEPHONE ENCOUNTER
"Pt called asking \"I was wondering if I could get an antibiotic and a yeast infection pill. I got a head cold. It's a cough and my whole body aches. And I did a covid test, came back negative.\"  Please advise.  "

## 2024-04-25 DIAGNOSIS — F41.9 ANXIETY: ICD-10-CM

## 2024-04-25 RX ORDER — ALPRAZOLAM 0.5 MG/1
0.5 TABLET ORAL 2 TIMES DAILY PRN
Qty: 60 TABLET | Refills: 0 | Status: SHIPPED | OUTPATIENT
Start: 2024-04-25 | End: 2024-05-25

## 2024-04-25 NOTE — TELEPHONE ENCOUNTER
Reason for call:   [x] Refill   [] Prior Auth  [] Other:     Office:   [x] PCP/Provider -   [] Specialty/Provider -     Medication: Xanax    Dose/Frequency: 0.5 mg     Quantity: #60    Pharmacy: [facility] [address] [place] [address]    Does the patient have enough for 3 days?   [] Yes   [x] No - Send as HP to POD

## 2024-04-29 ENCOUNTER — OFFICE VISIT (OUTPATIENT)
Dept: FAMILY MEDICINE CLINIC | Facility: CLINIC | Age: 59
End: 2024-04-29
Payer: COMMERCIAL

## 2024-04-29 VITALS
TEMPERATURE: 97.5 F | BODY MASS INDEX: 35.47 KG/M2 | SYSTOLIC BLOOD PRESSURE: 124 MMHG | WEIGHT: 200.2 LBS | RESPIRATION RATE: 18 BRPM | HEIGHT: 63 IN | OXYGEN SATURATION: 94 % | HEART RATE: 87 BPM | DIASTOLIC BLOOD PRESSURE: 76 MMHG

## 2024-04-29 DIAGNOSIS — Z13.6 SCREENING FOR CARDIOVASCULAR CONDITION: ICD-10-CM

## 2024-04-29 DIAGNOSIS — D75.1 SECONDARY POLYCYTHEMIA: ICD-10-CM

## 2024-04-29 DIAGNOSIS — Z12.11 ENCOUNTER FOR SCREENING COLONOSCOPY: ICD-10-CM

## 2024-04-29 DIAGNOSIS — R63.5 WEIGHT GAIN: ICD-10-CM

## 2024-04-29 DIAGNOSIS — J43.9 PULMONARY EMPHYSEMA, UNSPECIFIED EMPHYSEMA TYPE (HCC): Primary | ICD-10-CM

## 2024-04-29 DIAGNOSIS — J30.1 SEASONAL ALLERGIC RHINITIS DUE TO POLLEN: ICD-10-CM

## 2024-04-29 PROCEDURE — 99214 OFFICE O/P EST MOD 30 MIN: CPT | Performed by: INTERNAL MEDICINE

## 2024-04-29 NOTE — PROGRESS NOTES
Name: Michelle Trevino      : 1965      MRN: [id number]  Encounter Provider: Stacey Mckeon DO  Encounter Date: 2024   Encounter department: [place] PRIMARY CARE    Assessment & Plan     1. Pulmonary emphysema, unspecified emphysema type (HCC)  -     Comprehensive metabolic panel; Future  CT scan due again in November  Smoking cessation encouraged     2. Seasonal allergic rhinitis due to pollen  -     Comprehensive metabolic panel; Future  Stable on current regimen - she rotates her meds and it has been working     3. Secondary polycythemia  -     CBC and Platelet; Future  Pt saw oncology thru [facility] and they said to monitor labs and followup prn     4. Screening for cardiovascular condition  -     Lipid panel; Future  She is off statin for awhile now and will recheck level Pt concerned with side effects from statin although did not have reported side effects     5. Weight gain  -     Insulin, fasting; Future  Check labs Increase protein in diet discussed     GI referral for colon screen  Mammo due in November as is lung scan      Rto 3months   Subjective      HPI  Pt doing ok overall She is active and walks for exercise Allergy sxs fairly well managed and she rotates her medication which has helped No sob or cough Oncology followup [facility] pr[facility] No acute sxs or issues She is questioning when lung scan and mammo due next as she wants to keep up to date  Review of Systems   Constitutional:  Negative for chills and fever.   HENT: Negative.  Negative for congestion.         Allergies stable at this time   Eyes:  Negative for visual disturbance.   Respiratory:  Negative for cough and shortness of breath.    Cardiovascular:  Negative for chest pain, palpitations and leg swelling.   Gastrointestinal:  Negative for abdominal distention and abdominal pain.   Genitourinary: Negative.    Neurological:  Negative for dizziness, light-headedness and headaches.   Psychiatric/Behavioral:  Negative for sleep disturbance. The  "patient is not nervous/anxious.        Current Outpatient Medications on File Prior to Visit   Medication Sig    albuterol (PROVENTIL HFA,VENTOLIN HFA) 90 mcg/act inhaler Inhale 2 puffs every 6 (six) hours as needed for wheezing    ALPRAZolam (XANAX) 0.5 mg tablet Take 1 tablet (0.5 mg total) by mouth 2 (two) times a day as needed for anxiety    aspirin 325 mg tablet Take 81 mg by mouth daily    azelastine (OPTIVAR) 0.05 % ophthalmic solution Administer 1 drop to both eyes in the morning    Cetirizine HCl 10 MG CAPS Take 1 capsule (10 mg total) by mouth daily    citalopram (CeleXA) 20 mg tablet Take 1 tablet (20 mg total) by mouth daily    ergocalciferol (VITAMIN D2) 50,000 units Take 1 capsule (50,000 Units total) by mouth once a week    ezetimibe (ZETIA) 10 mg tablet Take 1 tablet (10 mg total) by mouth daily    fluticasone (FLONASE) 50 mcg/act nasal spray 1 spray into each nostril daily    hydrOXYzine HCL (ATARAX) 10 mg tablet Take 1 tablet (10 mg total) by mouth every 8 (eight) hours as needed for itching or anxiety    ipratropium (ATROVENT) 0.06 % nasal spray instill 2 sprays into each nostril twice a day    levocetirizine (XYZAL) 5 MG tablet Take 1 tablet (5 mg total) by mouth every evening    metoprolol tartrate (LOPRESSOR) 50 mg tablet Take 1 tablet (50 mg total) by mouth every 12 (twelve) hours    montelukast (SINGULAIR) 10 mg tablet Take 1 tablet (10 mg total) by mouth daily    valACYclovir (VALTREX) 1,000 mg tablet Take 1 tablet (1,000 mg total) by mouth 2 (two) times a day for 21 days (Patient taking differently: Take 1,000 mg by mouth if needed)       Objective     /76   Pulse 87   Temp 97.5 °F (36.4 °C) (Temporal)   Resp 18   Ht 5' 3\" (1.6 m)   Wt 90.8 kg (200 lb 3.2 oz)   SpO2 94%   BMI 35.46 kg/m²     Physical Exam  Vitals and nursing note reviewed.   Constitutional:       General: She is not in acute distress.     Appearance: Normal appearance. She is not ill-appearing, toxic-appearing " or diaphoretic.   HENT:      Head: Normocephalic and atraumatic.      Right Ear: External ear normal.      Left Ear: External ear normal.      Nose: Nose normal.      Mouth/Throat:      Mouth: Mucous membranes are moist.   Eyes:      General: No scleral icterus.     Extraocular Movements: Extraocular movements intact.      Conjunctiva/sclera: Conjunctivae normal.      Pupils: Pupils are equal, round, and reactive to light.   Cardiovascular:      Rate and Rhythm: Normal rate and regular rhythm.      Pulses: Normal pulses.      Heart sounds: Normal heart sounds.   Pulmonary:      Effort: Pulmonary effort is normal. No respiratory distress.      Breath sounds: Normal breath sounds. No wheezing.   Abdominal:      General: Bowel sounds are normal. There is no distension.      Palpations: Abdomen is soft.      Tenderness: There is no abdominal tenderness.   Musculoskeletal:      Cervical back: Normal range of motion and neck supple.      Right lower leg: No edema.      Left lower leg: No edema.   Lymphadenopathy:      Cervical: No cervical adenopathy.   Skin:     General: Skin is warm and dry.      Coloration: Skin is not jaundiced or pale.   Neurological:      General: No focal deficit present.      Mental Status: She is alert and oriented to person, place, and time. Mental status is at baseline.      Cranial Nerves: No cranial nerve deficit.   Psychiatric:         Mood and Affect: Mood normal.         Behavior: Behavior normal.         Thought Content: Thought content normal.         Judgment: Judgment normal.       Stacey Mckeon DO

## 2024-05-04 ENCOUNTER — APPOINTMENT (OUTPATIENT)
Dept: LAB | Facility: MEDICAL CENTER | Age: 59
End: 2024-05-04
Payer: COMMERCIAL

## 2024-05-04 DIAGNOSIS — D75.1 SECONDARY POLYCYTHEMIA: ICD-10-CM

## 2024-05-04 DIAGNOSIS — Z13.6 SCREENING FOR CARDIOVASCULAR CONDITION: ICD-10-CM

## 2024-05-04 DIAGNOSIS — J30.1 SEASONAL ALLERGIC RHINITIS DUE TO POLLEN: ICD-10-CM

## 2024-05-04 DIAGNOSIS — R63.5 WEIGHT GAIN: ICD-10-CM

## 2024-05-04 DIAGNOSIS — J43.9 PULMONARY EMPHYSEMA, UNSPECIFIED EMPHYSEMA TYPE (HCC): ICD-10-CM

## 2024-05-04 LAB
ALBUMIN SERPL BCP-MCNC: 4.2 G/DL (ref 3.5–5)
ALP SERPL-CCNC: 104 U/L (ref 34–104)
ALT SERPL W P-5'-P-CCNC: 13 U/L (ref 7–52)
ANION GAP SERPL CALCULATED.3IONS-SCNC: 9 MMOL/L (ref 4–13)
AST SERPL W P-5'-P-CCNC: 14 U/L (ref 13–39)
BILIRUB SERPL-MCNC: 0.53 MG/DL (ref 0.2–1)
BUN SERPL-MCNC: 11 MG/DL (ref 5–25)
CALCIUM SERPL-MCNC: 9 MG/DL (ref 8.4–10.2)
CHLORIDE SERPL-SCNC: 101 MMOL/L (ref 96–108)
CHOLEST SERPL-MCNC: 223 MG/DL
CO2 SERPL-SCNC: 30 MMOL/L (ref 21–32)
CREAT SERPL-MCNC: 0.95 MG/DL (ref 0.6–1.3)
ERYTHROCYTE [DISTWIDTH] IN BLOOD BY AUTOMATED COUNT: 12.7 % (ref 11.6–15.1)
GFR SERPL CREATININE-BSD FRML MDRD: 66 ML/MIN/1.73SQ M
GLUCOSE P FAST SERPL-MCNC: 95 MG/DL (ref 65–99)
HCT VFR BLD AUTO: 50.1 % (ref 34.8–46.1)
HDLC SERPL-MCNC: 44 MG/DL
HGB BLD-MCNC: 16.3 G/DL (ref 11.5–15.4)
INSULIN SERPL-ACNC: 10.19 UIU/ML (ref 1.9–23)
LDLC SERPL CALC-MCNC: 142 MG/DL (ref 0–100)
MCH RBC QN AUTO: 28.7 PG (ref 26.8–34.3)
MCHC RBC AUTO-ENTMCNC: 32.5 G/DL (ref 31.4–37.4)
MCV RBC AUTO: 88 FL (ref 82–98)
NONHDLC SERPL-MCNC: 179 MG/DL
PLATELET # BLD AUTO: 305 THOUSANDS/UL (ref 149–390)
PMV BLD AUTO: 11.6 FL (ref 8.9–12.7)
POTASSIUM SERPL-SCNC: 3.9 MMOL/L (ref 3.5–5.3)
PROT SERPL-MCNC: 7.3 G/DL (ref 6.4–8.4)
RBC # BLD AUTO: 5.68 MILLION/UL (ref 3.81–5.12)
SODIUM SERPL-SCNC: 140 MMOL/L (ref 135–147)
TRIGL SERPL-MCNC: 183 MG/DL
WBC # BLD AUTO: 9.93 THOUSAND/UL (ref 4.31–10.16)

## 2024-05-04 PROCEDURE — 85027 COMPLETE CBC AUTOMATED: CPT

## 2024-05-04 PROCEDURE — 83525 ASSAY OF INSULIN: CPT

## 2024-05-04 PROCEDURE — 80053 COMPREHEN METABOLIC PANEL: CPT

## 2024-05-04 PROCEDURE — 36415 COLL VENOUS BLD VENIPUNCTURE: CPT

## 2024-05-04 PROCEDURE — 80061 LIPID PANEL: CPT

## 2024-05-05 DIAGNOSIS — J98.01 BRONCHOSPASM: Primary | ICD-10-CM

## 2024-05-06 DIAGNOSIS — E78.5 HYPERLIPIDEMIA, UNSPECIFIED HYPERLIPIDEMIA TYPE: Primary | ICD-10-CM

## 2024-05-06 RX ORDER — IPRATROPIUM BROMIDE 42 UG/1
2 SPRAY, METERED NASAL 2 TIMES DAILY
Qty: 15 ML | Refills: 3 | Status: SHIPPED | OUTPATIENT
Start: 2024-05-06

## 2024-05-16 ENCOUNTER — PATIENT MESSAGE (OUTPATIENT)
Age: 59
End: 2024-05-16

## 2024-05-16 ENCOUNTER — TELEPHONE (OUTPATIENT)
Age: 59
End: 2024-05-16

## 2024-05-16 NOTE — TELEPHONE ENCOUNTER
05/16/24  Screened by: Pamela Abarca    Referring Provider PCP    Pre- Screening:     There is no height or weight on file to calculate BMI.   BMI - 35.46  Has patient been referred for a routine screening Colonoscopy? yes  Is the patient between 45-75 years old? yes      Previous Colonoscopy no   If yes:    Date:     Facility:     Reason:         Does the patient want to see a Gastroenterologist prior to their procedure OR are they having any GI symptoms? no    Has the patient been hospitalized or had abdominal surgery in the past 6 months? no    Does the patient use supplemental oxygen? no    Does the patient take Coumadin, Lovenox, Plavix, Elliquis, Xarelto, or other blood thinning medication? no    Has the patient had a stroke, cardiac event, or stent placed in the past year? no    If patient is between 45yrs - 49yrs, please advise patient that we will have to confirm benefits & coverage with their insurance company for a routine screening colonoscopy.

## 2024-05-16 NOTE — TELEPHONE ENCOUNTER
Scheduled date of colonoscopy (as of today): WEDNESDAY 7/10/2024  Physician performing colonoscopy: DR. JAIYEOLA  Location of colonoscopy: [facility]  Bowel prep reviewed with patient: MIRALAX / DULCOLAX  Instructions reviewed with patient by: ANNA LAI - Sent via [facility]  Clearances: N/A  Per patient she is known to stop breathing when placed under anesthesia

## 2024-05-24 DIAGNOSIS — F41.9 ANXIETY: ICD-10-CM

## 2024-05-24 RX ORDER — ALPRAZOLAM 0.5 MG/1
0.5 TABLET ORAL 2 TIMES DAILY PRN
Qty: 60 TABLET | Refills: 0 | Status: SHIPPED | OUTPATIENT
Start: 2024-05-24 | End: 2024-06-23

## 2024-06-17 ENCOUNTER — TELEPHONE (OUTPATIENT)
Age: 59
End: 2024-06-17

## 2024-06-17 DIAGNOSIS — J01.01 ACUTE RECURRENT MAXILLARY SINUSITIS: Primary | ICD-10-CM

## 2024-06-17 DIAGNOSIS — N76.1 SUBACUTE VAGINITIS: ICD-10-CM

## 2024-06-17 RX ORDER — AMOXICILLIN AND CLAVULANATE POTASSIUM 875; 125 MG/1; MG/1
1 TABLET, FILM COATED ORAL 2 TIMES DAILY
Qty: 14 TABLET | Refills: 0 | Status: SHIPPED | OUTPATIENT
Start: 2024-06-17 | End: 2024-06-24

## 2024-06-17 RX ORDER — FLUCONAZOLE 150 MG/1
150 TABLET ORAL ONCE
Qty: 1 TABLET | Refills: 1 | Status: SHIPPED | OUTPATIENT
Start: 2024-06-17 | End: 2024-06-17

## 2024-06-17 NOTE — TELEPHONE ENCOUNTER
Not feeling well since Thursday symptoms of head cold stuffy nose headache fever and diarrhea can doctor Linda send in antibiotic to [facility] pharmacy in [place] also for yeast infection medication to avoid getting uti after taking antibiotic she did 2 covid home test both were negative appt was offer for today prefer medication to be sent to pharmacy since she already has a schedule appt in July can the office call her when sent [phone]

## 2024-06-17 NOTE — TELEPHONE ENCOUNTER
I am not able to send rx from [facility] May not be sent until tonight She could go to care now if needs sooner

## 2024-06-24 DIAGNOSIS — F41.9 ANXIETY: ICD-10-CM

## 2024-06-25 RX ORDER — ALPRAZOLAM 0.5 MG/1
0.5 TABLET ORAL 2 TIMES DAILY PRN
Qty: 60 TABLET | Refills: 0 | Status: SHIPPED | OUTPATIENT
Start: 2024-06-25 | End: 2024-07-25

## 2024-07-08 ENCOUNTER — TELEPHONE (OUTPATIENT)
Age: 59
End: 2024-07-08

## 2024-07-08 DIAGNOSIS — J06.9 UPPER RESPIRATORY TRACT INFECTION, UNSPECIFIED TYPE: Primary | ICD-10-CM

## 2024-07-08 RX ORDER — AMOXICILLIN AND CLAVULANATE POTASSIUM 875; 125 MG/1; MG/1
1 TABLET, FILM COATED ORAL 2 TIMES DAILY
Qty: 14 TABLET | Refills: 0 | Status: SHIPPED | OUTPATIENT
Start: 2024-07-08 | End: 2024-07-15

## 2024-07-08 RX ORDER — FLUCONAZOLE 150 MG/1
150 TABLET ORAL ONCE
Qty: 1 TABLET | Refills: 0 | Status: SHIPPED | OUTPATIENT
Start: 2024-07-08 | End: 2024-07-08

## 2024-07-08 NOTE — TELEPHONE ENCOUNTER
Patient called in to request an antibiotic for symptoms of feeling feverish,achy,eyes burning, and yellow mucus in nose. Patient is also requesting a prescription for a yeast infection if given antibiotics.     I offered to make an apt to be seen, and patient declined. Patient would like prescriptions sent to   [id number]    Patient is also requesting for recommendations if she should still have colonoscopy 7/10 if she is feeling sick.     Please advise.  Patient would like a return call.  Thank you

## 2024-07-11 DIAGNOSIS — F41.9 ANXIETY: ICD-10-CM

## 2024-07-11 RX ORDER — HYDROXYZINE HYDROCHLORIDE 10 MG/1
10 TABLET, FILM COATED ORAL EVERY 8 HOURS PRN
Qty: 180 TABLET | Refills: 1 | Status: SHIPPED | OUTPATIENT
Start: 2024-07-11

## 2024-07-11 NOTE — TELEPHONE ENCOUNTER
Reason for call:   [x] Refill   [] Prior Auth  [] Other:     Office:   [x] PCP/Provider - Stacey Mckeon,   PCP - General  [] Specialty/Provider -     Medication:   hydrOXYzine HCL (ATARAX) 10 mg tablet 10 mg, Every 8 hours PRN # [id number]             Pharmacy: [id number] - [place] - [address]      Does the patient have enough for 3 days?   [] Yes   [x] No - Send as HP to POD

## 2024-07-12 DIAGNOSIS — J32.9 RECURRENT SINUSITIS: ICD-10-CM

## 2024-07-12 DIAGNOSIS — R05.9 COUGH: ICD-10-CM

## 2024-07-12 DIAGNOSIS — I10 ESSENTIAL HYPERTENSION: ICD-10-CM

## 2024-07-12 RX ORDER — METOPROLOL TARTRATE 50 MG/1
50 TABLET, FILM COATED ORAL EVERY 12 HOURS
Qty: 180 TABLET | Refills: 1 | Status: SHIPPED | OUTPATIENT
Start: 2024-07-12

## 2024-07-12 RX ORDER — MONTELUKAST SODIUM 10 MG/1
10 TABLET ORAL DAILY
Qty: 100 TABLET | Refills: 1 | Status: SHIPPED | OUTPATIENT
Start: 2024-07-12

## 2024-07-15 DIAGNOSIS — I10 ESSENTIAL HYPERTENSION: ICD-10-CM

## 2024-07-15 DIAGNOSIS — R05.9 COUGH: ICD-10-CM

## 2024-07-15 DIAGNOSIS — H57.9 ALLERGIC EYE REACTION: Primary | ICD-10-CM

## 2024-07-16 RX ORDER — ALBUTEROL SULFATE 90 UG/1
2 AEROSOL, METERED RESPIRATORY (INHALATION) EVERY 6 HOURS PRN
Qty: 8.5 G | Refills: 5 | Status: SHIPPED | OUTPATIENT
Start: 2024-07-16

## 2024-07-16 RX ORDER — AZELASTINE HYDROCHLORIDE 0.5 MG/ML
1 SOLUTION/ DROPS OPHTHALMIC DAILY
Qty: 6 ML | Refills: 3 | Status: SHIPPED | OUTPATIENT
Start: 2024-07-16

## 2024-07-16 RX ORDER — CITALOPRAM 20 MG/1
20 TABLET ORAL DAILY
Qty: 90 TABLET | Refills: 1 | Status: SHIPPED | OUTPATIENT
Start: 2024-07-16

## 2024-07-19 DIAGNOSIS — E55.9 VITAMIN D DEFICIENCY: ICD-10-CM

## 2024-07-20 RX ORDER — ERGOCALCIFEROL 1.25 MG/1
50000 CAPSULE ORAL WEEKLY
Qty: 12 CAPSULE | Refills: 3 | Status: SHIPPED | OUTPATIENT
Start: 2024-07-20

## 2024-07-22 DIAGNOSIS — F41.9 ANXIETY: ICD-10-CM

## 2024-07-22 RX ORDER — ALPRAZOLAM 0.5 MG/1
0.5 TABLET ORAL 2 TIMES DAILY PRN
Qty: 60 TABLET | Refills: 0 | OUTPATIENT
Start: 2024-07-22 | End: 2024-08-21

## 2024-07-27 DIAGNOSIS — F41.9 ANXIETY: ICD-10-CM

## 2024-07-29 ENCOUNTER — OFFICE VISIT (OUTPATIENT)
Dept: FAMILY MEDICINE CLINIC | Facility: CLINIC | Age: 59
End: 2024-07-29
Payer: COMMERCIAL

## 2024-07-29 VITALS
HEART RATE: 82 BPM | WEIGHT: 200.8 LBS | BODY MASS INDEX: 35.58 KG/M2 | SYSTOLIC BLOOD PRESSURE: 126 MMHG | DIASTOLIC BLOOD PRESSURE: 70 MMHG | RESPIRATION RATE: 18 BRPM | TEMPERATURE: 97.1 F | HEIGHT: 63 IN | OXYGEN SATURATION: 96 %

## 2024-07-29 DIAGNOSIS — F41.9 ANXIETY: ICD-10-CM

## 2024-07-29 DIAGNOSIS — I10 HYPERTENSION, UNSPECIFIED TYPE: ICD-10-CM

## 2024-07-29 DIAGNOSIS — D58.2 ELEVATED HEMOGLOBIN (HCC): ICD-10-CM

## 2024-07-29 DIAGNOSIS — J43.9 PULMONARY EMPHYSEMA, UNSPECIFIED EMPHYSEMA TYPE (HCC): Primary | ICD-10-CM

## 2024-07-29 PROCEDURE — 99214 OFFICE O/P EST MOD 30 MIN: CPT | Performed by: INTERNAL MEDICINE

## 2024-07-29 RX ORDER — ALPRAZOLAM 0.5 MG/1
0.5 TABLET ORAL 2 TIMES DAILY PRN
Qty: 60 TABLET | Refills: 0 | Status: SHIPPED | OUTPATIENT
Start: 2024-07-29

## 2024-07-29 RX ORDER — ALPRAZOLAM 0.5 MG/1
0.5 TABLET ORAL 2 TIMES DAILY PRN
Qty: 60 TABLET | Refills: 0 | Status: SHIPPED | OUTPATIENT
Start: 2024-07-29 | End: 2024-07-29 | Stop reason: SDUPTHER

## 2024-07-29 NOTE — PROGRESS NOTES
Ambulatory Visit  Name: Michelle Trevino      : 1965      MRN: [id number]  Encounter Provider: Stacey Mckeon DO  Encounter Date: 2024   Encounter department: [facility]    Assessment & Plan   1. Pulmonary emphysema, unspecified emphysema type (HCC)  Smoking cessation encouraged She does follow with Pulmonary/annual lung screen  2. Anxiety  -     ALPRAZolam (XANAX) 0.5 mg tablet; Take 1 tablet (0.5 mg total) by mouth 2 (two) times a day as needed for anxiety  Pt encouraged to trial half tablet during the day and continue to taper and use PM dose daily as needed She is willing to trial lower dose during the day as she feels she is more diverted during the day   3. Hypertension, unspecified type  Lo sodium diet Resume regular exercise Continue current rx      Rto 3months   History of Present Illness     HPI  Pt doing ok She was out of alprazolam thru the weekend and did struggle off the medication She does feel she is better during the day because she is home alone and diverted more She is still smoking Trying to exercise again more regularly   Review of Systems   Constitutional:  Negative for chills and fever.   HENT: Negative.     Eyes:  Negative for visual disturbance.   Respiratory:  Negative for cough and shortness of breath.    Cardiovascular:  Negative for chest pain, palpitations and leg swelling.   Gastrointestinal: Negative.    Genitourinary: Negative.    Musculoskeletal:  Positive for arthralgias.   Neurological:  Negative for dizziness, light-headedness and headaches.   Psychiatric/Behavioral:  Negative for sleep disturbance. The patient is not nervous/anxious.      Pertinent Medical History   Past Medical History:   Diagnosis Date    Age-related cataract of both eyes 10/21/2020    Anxiety     Arthritis     Breathing difficulty     Cancer (HCC)     Chronic serous otitis media, right ear     Deviated septum     Hearing aid worn     left    Hyperlipidemia     Hypertension      "Hypertrophy of nasal turbinates     Lung nodule     Mixed conductive and sensorineural hearing loss of both ears     Polycythemia     Shortness of breath     HUMPHREY    Stroke (HCC)     \"mini stroke yrs ago\" \"stress related\"     Past Surgical History:   Procedure Laterality Date    BREAST EXCISIONAL BIOPSY Right     benign    BREAST EXCISIONAL BIOPSY Left     benign    CATARACT EXTRACTION      CATARACT EXTRACTION, BILATERAL      HYSTERECTOMY      NE ADENOIDECTOMY PRIMARY AGE 12/> N/A 10/31/2022    Procedure: ADENOIDECTOMY;  Surgeon: Douglas Shah MD;  [facility];  Service: ENT    NE NASAL/SINUS NDSC SURG W/NICOLE BULLOSA RESECTION N/A 10/31/2022    Procedure: EXCISION OF LEFT NICOLE BULLOSA, BILATERAL MIDDLE MEATUS ANTROSTOMIES, BILATERAL ANTERIOR ETHMOIDECTOMIES WITH FUSION;  Surgeon: Dogulas Shah MD;  [facility];  Service: ENT    NE SEPTOPLASTY/SUBMUCOUS RESECJ W/WO CARTILAGE GRF N/A 10/31/2022    Procedure: SEPTOPLASTY, BILATERAL INFERIOR TURBINATE COBLATION;  Surgeon: Douglas Shah MD;  [facility];  Service: ENT    NE TYMPANOSTOMY GENERAL ANESTHESIA Bilateral 10/31/2022    Procedure: MYRINGOTOMY WITH TUBES;  Surgeon: Douglas Shah MD;  [facility];  Service: ENT    NE TYMPANOSTOMY GENERAL ANESTHESIA Bilateral 12/11/2023    Procedure: MYRINGOTOMY WITH TUBES;  Surgeon: Douglas Shah MD;  [facility];  Service: ENT    TUBAL LIGATION       Social History     Socioeconomic History    Marital status: /Civil Union     Spouse name: Not on file    Number of children: Not on file    Years of education: Not on file    Highest education level: Not on file   Occupational History    Not on file   Tobacco Use    Smoking status: Every Day     Current packs/day: 2.00     Average packs/day: 2.0 packs/day for 40.0 years (80.0 ttl pk-yrs)     Types: Cigarettes    Smokeless tobacco: Never   Vaping Use    Vaping status: Never Used   Substance and Sexual Activity    Alcohol use: Not " "Currently     Comment: 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    Drug use: No    Sexual activity: Not Currently     Partners: Male     Birth control/protection: Female Sterilization   Other Topics Concern    Not on file   Social History Narrative    Dental care, occasionally    Lives independently with spouse    No living will    Sun protection - sunscreen    Uses safety equipment - seatbelts     Social Determinants of Health     Financial Resource Strain: Not on file   Food Insecurity: Not on file   Transportation Needs: Not on file   Physical Activity: Not on file   Stress: Not on file   Social Connections: Unknown (6/18/2024)    Received from [facility]     How often do you feel lonely or isolated from those around you? (Adult - for ages 18 years and over): Not on file   Intimate Partner Violence: Not on file   Housing Stability: Not on file     Allergies   Allergen Reactions    Lorazepam Itching     Pt reports all over the body     Objective     /70   Pulse 82   Temp (!) 97.1 °F (36.2 °C) (Temporal)   Resp 18   Ht 5' 3\" (1.6 m)   Wt 91.1 kg (200 lb 12.8 oz)   SpO2 96%   BMI 35.57 kg/m²     Physical Exam  Vitals and nursing note reviewed.   Constitutional:       General: She is not in acute distress.     Appearance: Normal appearance. She is not ill-appearing, toxic-appearing or diaphoretic.   HENT:      Head: Normocephalic and atraumatic.      Right Ear: External ear normal.      Left Ear: External ear normal.      Nose: Nose normal.      Mouth/Throat:      Mouth: Mucous membranes are moist.   Eyes:      General: No scleral icterus.     Extraocular Movements: Extraocular movements intact.      Conjunctiva/sclera: Conjunctivae normal.      Pupils: Pupils are equal, round, and reactive to light.   Cardiovascular:      Rate and Rhythm: Normal rate and regular rhythm.      Pulses: Normal pulses.      Heart sounds: Normal heart sounds.   Pulmonary:      Effort: Pulmonary effort is normal. No respiratory " distress.      Breath sounds: Normal breath sounds. No wheezing.   Abdominal:      General: Bowel sounds are normal. There is no distension.      Palpations: Abdomen is soft.      Tenderness: There is no abdominal tenderness.   Musculoskeletal:      Cervical back: Normal range of motion and neck supple.      Right lower leg: No edema.      Left lower leg: No edema.   Lymphadenopathy:      Cervical: No cervical adenopathy.   Skin:     General: Skin is warm and dry.   Neurological:      General: No focal deficit present.      Mental Status: She is alert and oriented to person, place, and time. Mental status is at baseline.   Psychiatric:         Mood and Affect: Mood normal.         Behavior: Behavior normal.         Thought Content: Thought content normal.         Judgment: Judgment normal.     Administrative Statements

## 2024-07-30 ENCOUNTER — TELEPHONE (OUTPATIENT)
Dept: FAMILY MEDICINE CLINIC | Facility: CLINIC | Age: 59
End: 2024-07-30

## 2024-07-30 NOTE — TELEPHONE ENCOUNTER
Spoke with pt, is aware forms are at  for her to . While in office yesterday, signed a controlled substances agreement form since she takes xanax.

## 2024-08-05 DIAGNOSIS — E78.2 MIXED HYPERLIPIDEMIA: ICD-10-CM

## 2024-08-05 RX ORDER — EZETIMIBE 10 MG/1
10 TABLET ORAL DAILY
Qty: 90 TABLET | Refills: 3 | Status: SHIPPED | OUTPATIENT
Start: 2024-08-05

## 2024-08-07 ENCOUNTER — ANESTHESIA (OUTPATIENT)
Dept: PERIOP | Facility: HOSPITAL | Age: 59
End: 2024-08-07

## 2024-08-07 ENCOUNTER — HOSPITAL ENCOUNTER (OUTPATIENT)
Dept: PERIOP | Facility: HOSPITAL | Age: 59
Setting detail: OUTPATIENT SURGERY
Discharge: HOME | End: 2024-08-07
Payer: COMMERCIAL

## 2024-08-07 ENCOUNTER — ANESTHESIA EVENT (OUTPATIENT)
Dept: PERIOP | Facility: HOSPITAL | Age: 59
End: 2024-08-07

## 2024-08-07 VITALS
HEIGHT: 63 IN | RESPIRATION RATE: 20 BRPM | WEIGHT: 200 LBS | OXYGEN SATURATION: 93 % | SYSTOLIC BLOOD PRESSURE: 134 MMHG | BODY MASS INDEX: 35.44 KG/M2 | TEMPERATURE: 96.3 F | DIASTOLIC BLOOD PRESSURE: 87 MMHG | HEART RATE: 70 BPM

## 2024-08-07 DIAGNOSIS — Z12.11 SCREENING FOR COLON CANCER: ICD-10-CM

## 2024-08-07 PROCEDURE — 45385 COLONOSCOPY W/LESION REMOVAL: CPT | Performed by: STUDENT IN AN ORGANIZED HEALTH CARE EDUCATION/TRAINING PROGRAM

## 2024-08-07 PROCEDURE — 88305 TISSUE EXAM BY PATHOLOGIST: CPT | Performed by: PATHOLOGY

## 2024-08-07 RX ORDER — SODIUM CHLORIDE, SODIUM LACTATE, POTASSIUM CHLORIDE, CALCIUM CHLORIDE 600; 310; 30; 20 MG/100ML; MG/100ML; MG/100ML; MG/100ML
INJECTION, SOLUTION INTRAVENOUS CONTINUOUS PRN
Status: DISCONTINUED | OUTPATIENT
Start: 2024-08-07 | End: 2024-08-07

## 2024-08-07 RX ORDER — PROPOFOL 10 MG/ML
INJECTION, EMULSION INTRAVENOUS AS NEEDED
Status: DISCONTINUED | OUTPATIENT
Start: 2024-08-07 | End: 2024-08-07

## 2024-08-07 RX ORDER — LIDOCAINE HYDROCHLORIDE 10 MG/ML
INJECTION, SOLUTION EPIDURAL; INFILTRATION; INTRACAUDAL; PERINEURAL AS NEEDED
Status: DISCONTINUED | OUTPATIENT
Start: 2024-08-07 | End: 2024-08-07

## 2024-08-07 RX ADMIN — PROPOFOL 120 MCG/KG/MIN: 10 INJECTION, EMULSION INTRAVENOUS at 10:50

## 2024-08-07 RX ADMIN — SODIUM CHLORIDE, SODIUM LACTATE, POTASSIUM CHLORIDE, AND CALCIUM CHLORIDE: .6; .31; .03; .02 INJECTION, SOLUTION INTRAVENOUS at 10:45

## 2024-08-07 RX ADMIN — LIDOCAINE HYDROCHLORIDE 50 MG: 10 INJECTION, SOLUTION EPIDURAL; INFILTRATION; INTRACAUDAL; PERINEURAL at 10:48

## 2024-08-07 RX ADMIN — PROPOFOL 100 MG: 10 INJECTION, EMULSION INTRAVENOUS at 10:48

## 2024-08-07 NOTE — ANESTHESIA POSTPROCEDURE EVALUATION
Post-Op Assessment Note    CV Status:  Stable    Pain management: satisfactory to patient       Mental Status:  Alert and awake   Hydration Status:  Euvolemic   PONV Controlled:  Controlled   Airway Patency:  Patent     Post Op Vitals Reviewed: Yes    No anethesia notable event occurred.    Staff: CRNA               BP   105/68   Temp   97.2   Pulse  72   Resp   16   SpO2   92

## 2024-08-07 NOTE — DISCHARGE INSTRUCTIONS
Colonoscopy   WHAT YOU NEED TO KNOW:   A colonoscopy is a procedure to examine the inside of your colon (intestine) with a scope. Polyps or tissue growths may have been removed during your colonoscopy. It is normal to feel bloated and to have some abdominal discomfort. You should be passing gas. If you have hemorrhoids or you had polyps removed, you may have a small amount of bleeding.        DISCHARGE INSTRUCTIONS:   Seek care immediately if:   You have sudden, severe abdominal pain.     You have problems swallowing.     You have a large amount of black, sticky bowel movements or blood in your bowel movements.     You have sudden trouble breathing.     You feel weak, lightheaded, or faint or your heart beats faster than normal for you.     Contact your healthcare provider if:   You have a fever and chills.      You have nausea or are vomiting.      Your abdomen is bloated or feels full and hard.     You have abdominal pain.   You have black, sticky bowel movements or blood in your bowel movements.  You have not had a bowel movement for 3 days after your procedure.  You have rash or hives.  You have questions or concerns about your procedure.    Activity:   Do not lift, strain, or run for 24 hours after your procedure.     Rest after your procedure. You have been given medicine to relax you. Do not drive or make important decisions until the day after your procedure. Return to your normal activity as directed.     Relieve gas and discomfort from bloating by lying on your right side with a heating pad on your abdomen. You may need to take short walks to help the gas move out. Eat small meals until bloating is relieved.  Follow up with your healthcare provider as directed: Write down your questions so you remember to ask them during your visits.     If you take a “blood thinner”, please review the specific instructions from your endoscopist about when you should resume it. These can be found in the “Recommendation”  and “Your Medication list” sections of this After Visit Summary.

## 2024-08-07 NOTE — H&P
"  [facility]  History & Physical     PATIENT INFO     Name: Michelle Trevino  YOB: 1965   Age: 58 y.o.   Sex: female   MRN: [id number]     HISTORY OF PRESENT ILLNESS     Michelle Trevino is a 58 y.o. year old female who presents for screening colonoscopy.  No antithrombotics or anticoagulants.     REVIEW OF SYSTEMS     Per the HPI, and otherwise unremarkable.    Historical Information   Past Medical History:   Diagnosis Date    Age-related cataract of both eyes 10/21/2020    Anxiety     Arthritis     Breathing difficulty     Cancer (HCC)     Chronic serous otitis media, right ear     Deviated septum     Hearing aid worn     left    Hyperlipidemia     Hypertension     Hypertrophy of nasal turbinates     Lung nodule     Mixed conductive and sensorineural hearing loss of both ears     Polycythemia     Shortness of breath     HUMPHREY    Stroke (HCC)     \"mini stroke yrs ago\" \"stress related\"     Past Surgical History:   Procedure Laterality Date    BREAST EXCISIONAL BIOPSY Right     benign    BREAST EXCISIONAL BIOPSY Left     benign    CATARACT EXTRACTION      CATARACT EXTRACTION, BILATERAL      HYSTERECTOMY      MN ADENOIDECTOMY PRIMARY AGE 12/> N/A 10/31/2022    Procedure: ADENOIDECTOMY;  Surgeon: Douglas Shah MD;  [facility];  Service: ENT    MN NASAL/SINUS NDSC SURG W/NICOLE BULLOSA RESECTION N/A 10/31/2022    Procedure: EXCISION OF LEFT NICOLE BULLOSA, BILATERAL MIDDLE MEATUS ANTROSTOMIES, BILATERAL ANTERIOR ETHMOIDECTOMIES WITH FUSION;  Surgeon: Douglas Shah MD;  [facility];  Service: ENT    MN SEPTOPLASTY/SUBMUCOUS RESECJ W/WO CARTILAGE GRF N/A 10/31/2022    Procedure: SEPTOPLASTY, BILATERAL INFERIOR TURBINATE COBLATION;  Surgeon: Douglas Shah MD;  [facility];  Service: ENT    MN TYMPANOSTOMY GENERAL ANESTHESIA Bilateral 10/31/2022    Procedure: MYRINGOTOMY WITH TUBES;  Surgeon: Douglas Shah MD;  [facility];  Service: ENT    MN " TYMPANOSTOMY GENERAL ANESTHESIA Bilateral 12/11/2023    Procedure: MYRINGOTOMY WITH TUBES;  Surgeon: Douglas Shah MD;  Location:  MAIN OR;  Service: ENT    TUBAL LIGATION       Social History   Social History     Substance and Sexual Activity   Alcohol Use Not Currently    Comment: 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     Social History     Substance and Sexual Activity   Drug Use No     Social History     Tobacco Use   Smoking Status Every Day    Current packs/day: 2.00    Average packs/day: 2.0 packs/day for 40.0 years (80.0 ttl pk-yrs)    Types: Cigarettes   Smokeless Tobacco Never     Family History   Problem Relation Age of Onset    Hypertension Mother     Stroke Mother     Heart disease Mother     Parkinsonism Mother     Dementia Mother     Coronary artery disease Father     Glaucoma Father     Hypertension Father     Stroke Father     Diabetes Sister     Heart disease Sister     Lung disease Sister     Lung cancer Sister     Diabetes Daughter     Cancer Maternal Grandmother         unknown origin     Skin cancer Maternal Grandfather     No Known Problems Paternal Grandmother     No Known Problems Paternal Grandfather     Bone cancer Sister     Heart disease Sister     Deep vein thrombosis Sister     No Known Problems Sister     No Known Problems Sister     No Known Problems Sister     No Known Problems Maternal Aunt     No Known Problems Maternal Aunt     No Known Problems Paternal Aunt     No Known Problems Paternal Aunt     No Known Problems Paternal Aunt     No Known Problems Paternal Aunt     No Known Problems Paternal Aunt     No Known Problems Paternal Aunt     No Known Problems Paternal Aunt     No Known Problems Paternal Aunt     No Known Problems Paternal Aunt     No Known Problems Paternal Aunt     Liver disease Brother     Lung disease Brother     Diabetes Son         MEDICATIONS & ALLERGIES     Current Outpatient Medications   Medication Instructions    albuterol (PROVENTIL HFA,VENTOLIN HFA) 90 mcg/act inhaler 2  "puffs, Inhalation, Every 6 hours PRN    ALPRAZolam (XANAX) 0.5 mg, Oral, 2 times daily PRN    aspirin 81 mg, Oral, Daily    azelastine (OPTIVAR) 0.05 % ophthalmic solution 1 drop, Both Eyes, Daily    Cetirizine HCl 10 mg, Oral, Daily    citalopram (CELEXA) 20 mg, Oral, Daily    ergocalciferol (VITAMIN D2) 50,000 Units, Oral, Weekly    ezetimibe (ZETIA) 10 mg, Oral, Daily    fluconazole (DIFLUCAN) 150 mg, Oral, Daily    fluticasone (FLONASE) 50 mcg/act nasal spray 1 spray, Nasal, Daily    hydrOXYzine HCL (ATARAX) 10 mg, Oral, Every 8 hours PRN    ipratropium (ATROVENT) 0.06 % nasal spray 2 sprays, Nasal, 2 times daily    levocetirizine (XYZAL) 5 mg, Oral, Every evening    metoprolol tartrate (LOPRESSOR) 50 mg, Oral, Every 12 hours    montelukast (SINGULAIR) 10 mg, Oral, Daily     Allergies   Allergen Reactions    Lorazepam Itching     Pt reports all over the body        PHYSICAL EXAM      Objective   Blood pressure 150/69, pulse 68, temperature 98.1 °F (36.7 °C), temperature source Tympanic, resp. rate 20, height 5' 3\" (1.6 m), weight 90.7 kg (200 lb), SpO2 97%. Body mass index is 35.43 kg/m².    General Appearance:   Alert, cooperative, no distress   Lungs:   Equal chest rise, respirations unlabored    Heart:   Regular rate and rhythm   Abdomen:   Soft, non-tender, non-distended; normal bowel sounds; no masses, no organomegaly    Extremities:   No edema       ASSESSMENT & PLAN     This is a 58 y.o. year old female here for colonoscopy, and she is stable and optimized for her procedure.      Yemi Serrano D.O.  [facility]  Division of Gastroenterology & Hepatology  Available on Tiger[employer]  [email or web address]    ** Please Note: This note is constructed using a voice recognition dictation system. **  "

## 2024-08-07 NOTE — ANESTHESIA PREPROCEDURE EVALUATION
Procedure:  COLONOSCOPY    80 py smoker  Took metoprolol and atrovent this morning    Relevant Problems   CARDIO   (+) Hyperlipidemia   (+) Hypertension      GI/HEPATIC   (+) Esophageal reflux      MUSCULOSKELETAL   (+) Diastasis recti      NEURO/PSYCH   (+) Anxiety      PULMONARY   (+) Chronic obstructive airway disease (HCC)      Ear/Nose/Throat   (+) Chronic pansinusitis      Blood   (+) Secondary polycythemia      Other   (+) Class 1 obesity        Physical Exam    Airway    Mallampati score: II  TM Distance: >3 FB  Neck ROM: full     Dental       Cardiovascular      Pulmonary      Other Findings  post-pubertal.      Anesthesia Plan  ASA Score- 3     Anesthesia Type- IV sedation with anesthesia with ASA Monitors.         Additional Monitors:     Airway Plan:     Comment: Recent labs personally reviewed:  Lab Results       Component                Value               Date                       WBC                      9.93                05/04/2024                 HGB                      16.3 (H)            05/04/2024                 PLT                      305                 05/04/2024            Lab Results       Component                Value               Date                       NA                       138                 10/05/2016                 K                        3.9                 05/04/2024                 BUN                      11                  05/04/2024                 CREATININE               0.95                05/04/2024                 GLUCOSE                  87                  06/24/2014            Lab Results       Component                Value               Date                       PTT                      27                  06/24/2014             Lab Results       Component                Value               Date                       INR                      1.0                 10/04/2022              Blood type O    I, Carmen Marks MD, have personally seen and  evaluated the patient prior to anesthetic care.  I have reviewed the pre-anesthetic record, medical history, allergies, medications and any other medical records if appropriate to the anesthetic care.  If a CRNA is involved in the case, I have reviewed the CRNA assessment, if present, and agree. Patient consented for IV Sedation, general anesthesia as back up. Discussed risks of aspiration, IV infiltration, indications for conversion to general anesthesia. All questions and concerns addressed.     .       Plan Factors-Exercise tolerance (METS): >4 METS.    Chart reviewed.   Existing labs reviewed.     Patient is a current smoker.  Patient instructed to abstain from smoking on day of procedure. Patient smoked on day of surgery.    Obstructive sleep apnea risk education given perioperatively.        Induction- intravenous.    Postoperative Plan-     Perioperative Resuscitation Plan - Level 1 - Full Code.       Informed Consent- Anesthetic plan and risks discussed with patient.  I personally reviewed this patient with the CRNA. Discussed and agreed on the Anesthesia Plan with the CRNA..

## 2024-08-09 PROCEDURE — 88305 TISSUE EXAM BY PATHOLOGIST: CPT | Performed by: PATHOLOGY

## 2024-08-15 ENCOUNTER — TELEPHONE (OUTPATIENT)
Age: 59
End: 2024-08-15

## 2024-08-15 DIAGNOSIS — J06.9 UPPER RESPIRATORY TRACT INFECTION, UNSPECIFIED TYPE: Primary | ICD-10-CM

## 2024-08-15 RX ORDER — FLUCONAZOLE 150 MG/1
150 TABLET ORAL ONCE
Qty: 1 TABLET | Refills: 0 | Status: SHIPPED | OUTPATIENT
Start: 2024-08-15 | End: 2024-08-15

## 2024-08-15 NOTE — TELEPHONE ENCOUNTER
Pt is feeling achy sore throat congested took covid test was negative can doctor Linda send in antibiotic along with medication for yeast infection anytime pt takes antibiotic pt will need both.took 2 Advil 200 mg today pt stated she has a f/u every 3 mos does she need an appt.please advise.

## 2024-08-26 ENCOUNTER — VBI (OUTPATIENT)
Dept: ADMINISTRATIVE | Facility: OTHER | Age: 59
End: 2024-08-26

## 2024-08-26 NOTE — TELEPHONE ENCOUNTER
08/26/24 12:04 PM     Chart reviewed for CRC: Colonoscopy was/were submitted to the patient's insurance.     Shivani Hall   [facility]

## 2024-08-28 DIAGNOSIS — F41.9 ANXIETY: ICD-10-CM

## 2024-08-28 RX ORDER — ALPRAZOLAM 0.5 MG
0.5 TABLET ORAL 2 TIMES DAILY PRN
Qty: 60 TABLET | Refills: 0 | Status: SHIPPED | OUTPATIENT
Start: 2024-08-28

## 2024-09-18 ENCOUNTER — NURSE TRIAGE (OUTPATIENT)
Age: 59
End: 2024-09-18

## 2024-09-18 NOTE — TELEPHONE ENCOUNTER
"Pt reports she has had 4-bm yesterday and 10 to 11 today. Pt states the bms are watery in consistency. Pt states she only has abdominal pain when she needs to have a bm and then it goes away.     Pt states she is drinking well and no other s/s to report at this time. Pt think she may have stomach virus.     Per protocol Triage nurse instructed pt to go to the ER, pt refused and wants PCP to call pt back to further advise.    PCP please call the pt and further medical guide pt.[phone] (Mobile.  Thank you     Reason for Disposition   SEVERE diarrhea (e.g., 7 or more times / day more than normal) and age > 60 years    Answer Assessment - Initial Assessment Questions  1. DIARRHEA SEVERITY: \"How bad is the diarrhea?\" \"How many extra stools have you had in the past 24 hours than normal?\"     - NO DIARRHEA (SCALE 0)    - MILD (SCALE 1-3): Few loose or mushy BMs; increase of 1-3 stools over normal daily number of stools; mild increase in ostomy output.    -  MODERATE (SCALE 4-7): Increase of 4-6 stools daily over normal; moderate increase in ostomy output.  * SEVERE (SCALE 8-10; OR 'WORST POSSIBLE'): Increase of 7 or more stools daily over normal; moderate increase in ostomy output; incontinence.      4-bm 9/17/24, 11-9/18/24 today  2. ONSET: \"When did the diarrhea begin?\"       9/17/24  3. BM CONSISTENCY: \"How loose or watery is the diarrhea?\"       watery  4. VOMITING: \"Are you also vomiting?\" If Yes, ask: \"How many times in the past 24 hours?\"       denies  5. ABDOMINAL PAIN: \"Are you having any abdominal pain?\" If Yes, ask: \"What does it feel like?\" (e.g., crampy, dull, intermittent, constant)       Pain but when she has a bm the pain goes away, intermittent   6. ABDOMINAL PAIN SEVERITY: If present, ask: \"How bad is the pain?\"  (e.g., Scale 1-10; mild, moderate, or severe)    - MILD (1-3): doesn't interfere with normal activities, abdomen soft and not tender to touch     - MODERATE (4-7): interferes with normal " "activities or awakens from sleep, tender to touch     - SEVERE (8-10): excruciating pain, doubled over, unable to do any normal activities        Severe   7. ORAL INTAKE: If vomiting, \"Have you been able to drink liquids?\" \"How much fluids have you had in the past 24 hours?\"      2 1/2 gallons of water  8. HYDRATION: \"Any signs of dehydration?\" (e.g., dry mouth [not just dry lips], too weak to stand, dizziness, new weight loss) \"When did you last urinate?\"      Dry mouth at night  9. EXPOSURE: \"Have you traveled to a foreign country recently?\" \"Have you been exposed to anyone with diarrhea?\" \"Could you have eaten any food that was spoiled?\"      Unsure but denies  10. ANTIBIOTIC USE: \"Are you taking antibiotics now or have you taken antibiotics in the past 2 months?\"        4 weeks ago she was amoxicillin   11. OTHER SYMPTOMS: \"Do you have any other symptoms?\" (e.g., fever, blood in stool)        Possible fever but pt can't take the temp denies other s/s  12. PREGNANCY: \"Is there any chance you are pregnant?\" \"When was your last menstrual period?\"        N/a    Protocols used: Diarrhea-ADULT-[place]    "

## 2024-09-27 DIAGNOSIS — F41.9 ANXIETY: ICD-10-CM

## 2024-09-27 RX ORDER — ALPRAZOLAM 0.5 MG
0.5 TABLET ORAL 2 TIMES DAILY PRN
Qty: 60 TABLET | Refills: 0 | Status: SHIPPED | OUTPATIENT
Start: 2024-09-27

## 2024-09-30 DIAGNOSIS — H57.9 ALLERGIC EYE REACTION: ICD-10-CM

## 2024-09-30 DIAGNOSIS — J98.01 BRONCHOSPASM: ICD-10-CM

## 2024-09-30 DIAGNOSIS — N76.1 SUBACUTE VAGINITIS: Primary | ICD-10-CM

## 2024-09-30 RX ORDER — IPRATROPIUM BROMIDE 42 UG/1
2 SPRAY, METERED NASAL 2 TIMES DAILY
Qty: 15 ML | Refills: 0 | Status: SHIPPED | OUTPATIENT
Start: 2024-09-30

## 2024-09-30 RX ORDER — FLUTICASONE PROPIONATE 50 MCG
1 SPRAY, SUSPENSION (ML) NASAL DAILY
Qty: 15.8 ML | Refills: 0 | Status: SHIPPED | OUTPATIENT
Start: 2024-09-30

## 2024-09-30 RX ORDER — FLUCONAZOLE 150 MG/1
150 TABLET ORAL DAILY
Qty: 1 TABLET | Refills: 0 | Status: SHIPPED | OUTPATIENT
Start: 2024-09-30 | End: 2024-10-01

## 2024-09-30 RX ORDER — AZELASTINE HYDROCHLORIDE 0.5 MG/ML
1 SOLUTION/ DROPS OPHTHALMIC DAILY
Qty: 6 ML | Refills: 0 | Status: SHIPPED | OUTPATIENT
Start: 2024-09-30

## 2024-10-28 ENCOUNTER — OFFICE VISIT (OUTPATIENT)
Dept: FAMILY MEDICINE CLINIC | Facility: CLINIC | Age: 59
End: 2024-10-28
Payer: COMMERCIAL

## 2024-10-28 VITALS
BODY MASS INDEX: 35.72 KG/M2 | DIASTOLIC BLOOD PRESSURE: 74 MMHG | RESPIRATION RATE: 18 BRPM | OXYGEN SATURATION: 97 % | WEIGHT: 201.6 LBS | SYSTOLIC BLOOD PRESSURE: 128 MMHG | HEIGHT: 63 IN | TEMPERATURE: 97.3 F | HEART RATE: 83 BPM

## 2024-10-28 DIAGNOSIS — Z12.31 ENCOUNTER FOR SCREENING MAMMOGRAM FOR MALIGNANT NEOPLASM OF BREAST: ICD-10-CM

## 2024-10-28 DIAGNOSIS — K43.9 VENTRAL HERNIA WITHOUT OBSTRUCTION OR GANGRENE: Primary | ICD-10-CM

## 2024-10-28 DIAGNOSIS — R23.2 HOT FLASHES: ICD-10-CM

## 2024-10-28 DIAGNOSIS — Z12.2 ENCOUNTER FOR SCREENING FOR LUNG CANCER: ICD-10-CM

## 2024-10-28 DIAGNOSIS — F41.9 ANXIETY: ICD-10-CM

## 2024-10-28 PROCEDURE — 99214 OFFICE O/P EST MOD 30 MIN: CPT | Performed by: INTERNAL MEDICINE

## 2024-10-28 RX ORDER — ALPRAZOLAM 0.5 MG
0.5 TABLET ORAL 2 TIMES DAILY PRN
Qty: 60 TABLET | Refills: 0 | Status: SHIPPED | OUTPATIENT
Start: 2024-10-28

## 2024-10-28 NOTE — PROGRESS NOTES
Ambulatory Visit  Name: Michelle Trevino      : 1965      MRN: [id number]  Encounter Provider: Stacey Mckeon DO  Encounter Date: 10/28/2024   Encounter department: [facility]    Assessment & Plan  Anxiety    Orders:  •  ALPRAZolam (XANAX) 0.5 mg tablet; Take 1 tablet (0.5 mg total) by mouth 2 (two) times a day as needed for anxiety  Pt has been trying to take more sparingly and has been scaling back to once daily at times   Encounter for screening mammogram for malignant neoplasm of breast    Orders:  •  Mammo screening bilateral w 3d and cad; Future  APpt made for Mammo  Ventral hernia without obstruction or gangrene    Orders:  •  Ambulatory Referral to General Surgery; Future  Pt has had intermittent abdominal pain and would like repair of hernia  Hot flashes    Orders:  •  TSH, 3rd generation with Free T4 reflex; Future  Check tsh with upcoming labs   Encounter for screening for lung cancer  I discussed with her that she is a candidate for lung cancer CT screening.     The following Shared Decision-Making points were covered:  Benefits of screening were discussed, including the rates of reduction in death from lung cancer and other causes.  Harms of screening were reviewed, including false positive tests, radiation exposure levels, risks of invasive procedures, risks of complications of screening, and risk of overdiagnosis.  I counseled on the importance of adherence to annual lung cancer LDCT screening, impact of co-morbidities, and ability or willingness to undergo diagnosis and treatment.  I counseled on the importance of maintaining abstinence as a former smoker or was counseled on the importance of smoking cessation if a current smoker    Review of Eligibility Criteria: She meets all of the criteria for Lung Cancer Screening.   She is 58 y.o.   She has 20 pack year tobacco history and is a current smoker or has quit within the past 15 years  She presents no signs or symptoms of lung  "cancer    After discussion, the patient decided to elect lung cancer screening.    Orders:  •  CT lung screening program; Future  Pt appt made with CT lungn    Depression Screening and Follow-up Plan: Patient was screened for depression during today's encounter. They screened negative with a PHQ-2 score of 0.  Rto 3months  History of Present Illness     HPI  Pt doing ok She has been able on occasion to take prn anxiety med once instead of bid She has had upper abdominal pain intermittingly No n/v She would like further workup Bowels are ok but not emptying as well as she had No melena Still smoking and no plan to quit No chest pain Not very active during the day and tires No falls No recent ilness     Review of Systems   Constitutional:  Negative for chills and fever.   HENT: Negative.     Eyes:  Negative for visual disturbance.   Respiratory:  Negative for cough and shortness of breath.    Cardiovascular:  Negative for chest pain, palpitations and leg swelling.   Gastrointestinal: Negative.    Genitourinary: Negative.    Neurological:  Negative for dizziness, light-headedness and headaches.     Past Medical History:   Diagnosis Date   • Age-related cataract of both eyes 10/21/2020   • Anxiety    • Arthritis    • Breathing difficulty    • Cancer (HCC)    • Chronic serous otitis media, right ear    • Deviated septum    • Hearing aid worn     left   • Hyperlipidemia    • Hypertension    • Hypertrophy of nasal turbinates    • Lung nodule    • Mixed conductive and sensorineural hearing loss of both ears    • Polycythemia    • Shortness of breath     HUMPHRYE   • Stroke (HCC)     \"mini stroke yrs ago\" \"stress related\"     Past Surgical History:   Procedure Laterality Date   • BREAST EXCISIONAL BIOPSY Right     benign   • BREAST EXCISIONAL BIOPSY Left     benign   • CATARACT EXTRACTION     • CATARACT EXTRACTION, BILATERAL     • HYSTERECTOMY     • KS ADENOIDECTOMY PRIMARY AGE 12/> N/A 10/31/2022    Procedure: ADENOIDECTOMY;  " Surgeon: Douglas Shah MD;  [facility]: [facility];  Service: ENT   • WA NASAL/SINUS NDSC SURG W/NICOLE BULLOSA RESECTION N/A 10/31/2022    Procedure: EXCISION OF LEFT NICOLE BULLOSA, BILATERAL MIDDLE MEATUS ANTROSTOMIES, BILATERAL ANTERIOR ETHMOIDECTOMIES WITH FUSION;  Surgeon: Douglas Shah MD;  [facility];  Service: ENT   • WA SEPTOPLASTY/SUBMUCOUS RESECJ W/WO CARTILAGE GRF N/A 10/31/2022    Procedure: SEPTOPLASTY, BILATERAL INFERIOR TURBINATE COBLATION;  Surgeon: Douglas Shah MD;  [facility];  Service: ENT   • WA TYMPANOSTOMY GENERAL ANESTHESIA Bilateral 10/31/2022    Procedure: MYRINGOTOMY WITH TUBES;  Surgeon: Douglas Shah MD;  [facility];  Service: ENT   • WA TYMPANOSTOMY GENERAL ANESTHESIA Bilateral 12/11/2023    Procedure: MYRINGOTOMY WITH TUBES;  Surgeon: Douglas Shah MD;  [facility];  Service: ENT   • TUBAL LIGATION       Social History     Socioeconomic History   • Marital status: /Civil Union     Spouse name: Not on file   • Number of children: Not on file   • Years of education: Not on file   • Highest education level: Not on file   Occupational History   • Not on file   Tobacco Use   • Smoking status: Every Day     Current packs/day: 2.00     Average packs/day: 2.0 packs/day for 40.0 years (80.0 ttl pk-yrs)     Types: Cigarettes   • Smokeless tobacco: Never   Vaping Use   • Vaping status: Never Used   Substance and Sexual Activity   • Alcohol use: Not Currently     Comment: 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   • Drug use: No   • Sexual activity: Not Currently     Partners: Male     Birth control/protection: Female Sterilization   Other Topics Concern   • Not on file   Social History Narrative    Dental care, occasionally    Lives independently with spouse    No living will    Sun protection - sunscreen    Uses safety equipment - seatbelts     Social Determinants of Health     Financial Resource Strain: Not on file   Food Insecurity: Not on file   Transportation Needs:  "Not on file   Physical Activity: Not on file   Stress: Not on file   Social Connections: Unknown (6/18/2024)    Received from [employer]    • How often do you feel lonely or isolated from those around you? (Adult - for ages 18 years and over): Not on file   Intimate Partner Violence: Not on file   Housing Stability: Not on file     Allergies   Allergen Reactions   • Lorazepam Itching     Pt reports all over the body           Objective     /74   Pulse 83   Temp (!) 97.3 °F (36.3 °C) (Temporal)   Resp 18   Ht 5' 3\" (1.6 m)   Wt 91.4 kg (201 lb 9.6 oz)   SpO2 97%   BMI 35.71 kg/m²     Physical Exam  Vitals and nursing note reviewed.   Constitutional:       General: She is not in acute distress.     Appearance: Normal appearance. She is not ill-appearing, toxic-appearing or diaphoretic.   HENT:      Head: Normocephalic and atraumatic.      Ears:      Comments: Tube in right canal left tube in place     Nose: Nose normal. No congestion.      Mouth/Throat:      Mouth: Mucous membranes are moist.   Eyes:      General: No scleral icterus.  Cardiovascular:      Rate and Rhythm: Normal rate and regular rhythm.      Pulses: Normal pulses.      Heart sounds: Normal heart sounds.   Pulmonary:      Effort: Pulmonary effort is normal. No respiratory distress.      Breath sounds: Normal breath sounds.   Abdominal:      General: There is no distension.      Tenderness: There is no abdominal tenderness. There is left CVA tenderness.      Hernia: A hernia is present.   Musculoskeletal:         General: Normal range of motion.      Cervical back: Normal range of motion and neck supple.      Right lower leg: No edema.      Left lower leg: No edema.   Lymphadenopathy:      Cervical: No cervical adenopathy.   Skin:     General: Skin is warm.   Neurological:      General: No focal deficit present.      Mental Status: She is alert and oriented to person, place, and time. Mental status is at baseline.      " Cranial Nerves: No cranial nerve deficit.      Motor: No weakness.   Psychiatric:         Mood and Affect: Mood normal.

## 2024-10-28 NOTE — ASSESSMENT & PLAN NOTE
Orders:    ALPRAZolam (XANAX) 0.5 mg tablet; Take 1 tablet (0.5 mg total) by mouth 2 (two) times a day as needed for anxiety  Pt has been trying to take more sparingly and has been scaling back to once daily at times

## 2024-10-29 DIAGNOSIS — Z00.6 ENCOUNTER FOR EXAMINATION FOR NORMAL COMPARISON OR CONTROL IN CLINICAL RESEARCH PROGRAM: ICD-10-CM

## 2024-11-11 ENCOUNTER — CONSULT (OUTPATIENT)
Dept: SURGERY | Facility: CLINIC | Age: 59
End: 2024-11-11
Payer: COMMERCIAL

## 2024-11-11 VITALS
WEIGHT: 203 LBS | SYSTOLIC BLOOD PRESSURE: 126 MMHG | HEIGHT: 63 IN | TEMPERATURE: 98.2 F | DIASTOLIC BLOOD PRESSURE: 86 MMHG | BODY MASS INDEX: 35.97 KG/M2

## 2024-11-11 DIAGNOSIS — R10.11 RUQ PAIN: Primary | ICD-10-CM

## 2024-11-11 DIAGNOSIS — K43.9 VENTRAL HERNIA WITHOUT OBSTRUCTION OR GANGRENE: ICD-10-CM

## 2024-11-11 DIAGNOSIS — M62.08 DIASTASIS RECTI: ICD-10-CM

## 2024-11-11 PROCEDURE — 99244 OFF/OP CNSLTJ NEW/EST MOD 40: CPT | Performed by: SURGERY

## 2024-11-12 PROBLEM — K43.9 VENTRAL HERNIA WITHOUT OBSTRUCTION OR GANGRENE: Status: ACTIVE | Noted: 2024-11-12

## 2024-11-12 PROBLEM — R10.11 RUQ PAIN: Status: ACTIVE | Noted: 2024-11-12

## 2024-11-12 NOTE — ASSESSMENT & PLAN NOTE
Obvious hernia palpable on exam.  However there is diastases which is known to the patient.  Given the patient's body habitus will obtain CT scan to completely rule out any abdominal wall defect.    Orders:    Ambulatory Referral to General Surgery    CT abdomen pelvis w contrast; Future    Comprehensive metabolic panel; Future

## 2024-11-12 NOTE — ASSESSMENT & PLAN NOTE
Right upper quadrant/epigastric pain noted.  Patient states its wraps around to her side.  She does not notice any bulge aside from a diastases that would be consistent with an upper abdominal hernia.  Upon further evaluation patient states it does get worse with eating.  She has bloating and nausea.  Honestly I do feel this discomfort could be related to potential gallbladder disease.  Will start with a right upper quadrant ultrasound to rule out cholelithiasis.  However if this is negative I will move onto a HIDA scan because based on her bloating and nausea there could be an element of biliary dyskinesia.  Patient inquired briefly about with the surgery would entail, and thus we briefly discussed surgery if this should be positive with regards to being able to do it here at [facility].  The fact that this would likely be minimally invasive surgery.  Briefly discussed risk including bleeding infection injury to bile ducts and need for further surgery.  Patient states that she would be happy to get the gallbladder out if it is truly the cause.    Orders:    US right upper quadrant; Future    Comprehensive metabolic panel; Future

## 2024-11-12 NOTE — PROGRESS NOTES
Ambulatory Visit  Name: Michelle Trevino      : 1965      MRN: [id number]  Encounter Provider: Saravanan Ramos DO  Encounter Date: 2024   Encounter department: [facility]    Assessment & Plan  Ventral hernia without obstruction or gangrene  Obvious hernia palpable on exam.  However there is diastases which is known to the patient.  Given the patient's body habitus will obtain CT scan to completely rule out any abdominal wall defect.    Orders:    Ambulatory Referral to General Surgery    CT abdomen pelvis w contrast; Future    Comprehensive metabolic panel; Future      RUQ pain  Right upper quadrant/epigastric pain noted.  Patient states its wraps around to her side.  She does not notice any bulge aside from a diastases that would be consistent with an upper abdominal hernia.  Upon further evaluation patient states it does get worse with eating.  She has bloating and nausea.  Honestly I do feel this discomfort could be related to potential gallbladder disease.  Will start with a right upper quadrant ultrasound to rule out cholelithiasis.  However if this is negative I will move onto a HIDA scan because based on her bloating and nausea there could be an element of biliary dyskinesia.  Patient inquired briefly about with the surgery would entail, and thus we briefly discussed surgery if this should be positive with regards to being able to do it here at [facility].  The fact that this would likely be minimally invasive surgery.  Briefly discussed risk including bleeding infection injury to bile ducts and need for further surgery.  Patient states that she would be happy to get the gallbladder out if it is truly the cause.    Orders:    US right upper quadrant; Future    Comprehensive metabolic panel; Future    Diastasis recti  Persistent diastases of the upper abdomen.  No obvious or gross hernia on exam.  There is some tenderness along the upper midline which I suspect could be  related to intra-abdominal foregut pathology such as gallbladder disease.  However we will obtain CT scan to further evaluate to confirm that there is no underlying small hernia that is being missed.             Imaging:    Previous abdominal wall ultrasound dated fibber 26, 2020 was personally reviewed by me.    Labs/blood work:    Most recent blood work including CMP, CBC dated May 4, 2024 was personally reviewed.    Notes:    Most recent PCP note dated October 28, 2024 was personally evaluated.    History of Present Illness     Michelle Trevino is a 58 y.o. female who presents today in consultation for evaluation of upper abdominal discomfort.  Patient was seen by her PCP did complain of some upper abdominal discomfort and at that time was told it could be a ventral hernia.  Patient does notice a bulge there but she states the bulge has been there for some time and understands this is likely diastases.  This was discussed on previous office visits therefore she knows that she does have a diastases recti and not necessarily hernia.  Upon further evaluation and discussion patient states that this does get worse with eating usually a few hours after.  She does complain of nausea and bloating along with the discomfort.  She states that the pain is in the upper abdomen and wraps around to the her right side and sometimes to her back.  Furthermore she states that she has diarrhea constantly and this usually happens right after she eats.  No fevers or chills.    History obtained from : patient  Review of Systems    Review of systems completed, all negative except as notable HPI.    Past Medical History   Past Medical History:   Diagnosis Date    Age-related cataract of both eyes 10/21/2020    Anxiety     Arthritis     Breast mass     Breathing difficulty     Cancer (HCC)     Chronic serous otitis media, right ear     Colon polyp     Deviated septum     Hearing aid worn     left    Hyperlipidemia     Hypertension      "Hypertrophy of nasal turbinates     Lung nodule     Mixed conductive and sensorineural hearing loss of both ears     Polycythemia     Shortness of breath     HUMPHREY    Stroke (HCC)     \"mini stroke yrs ago\" \"stress related\"     Past Surgical History:   Procedure Laterality Date    BREAST EXCISIONAL BIOPSY Right     benign    BREAST EXCISIONAL BIOPSY Left     benign    CATARACT EXTRACTION      CATARACT EXTRACTION, BILATERAL      COLONOSCOPY      HYSTERECTOMY      [place] ADENOIDECTOMY PRIMARY AGE 12/> N/A 10/31/2022    Procedure: ADENOIDECTOMY;  Surgeon: Douglas Shah MD;  [facility];  Service: ENT    TN NASAL/SINUS NDSC SURG W/NICOLE BULLOSA RESECTION N/A 10/31/2022    Procedure: EXCISION OF LEFT NICOLE BULLOSA, BILATERAL MIDDLE MEATUS ANTROSTOMIES, BILATERAL ANTERIOR ETHMOIDECTOMIES WITH FUSION;  Surgeon: Douglas Shah MD;  [facility];  Service: ENT    TN SEPTOPLASTY/SUBMUCOUS RESECJ W/WO CARTILAGE GRF N/A 10/31/2022    Procedure: SEPTOPLASTY, BILATERAL INFERIOR TURBINATE COBLATION;  Surgeon: Douglas Shah MD;  [facility];  Service: ENT    TN TYMPANOSTOMY GENERAL ANESTHESIA Bilateral 10/31/2022    Procedure: MYRINGOTOMY WITH TUBES;  Surgeon: Douglas Shah MD;  [facility];  Service: ENT    TN TYMPANOSTOMY GENERAL ANESTHESIA Bilateral 12/11/2023    Procedure: MYRINGOTOMY WITH TUBES;  Surgeon: Douglas Shah MD;  [facility];  Service: ENT    TUBAL LIGATION       Family History   Problem Relation Age of Onset    Hypertension Mother     Stroke Mother     Heart disease Mother     Parkinsonism Mother     Dementia Mother     Coronary artery disease Father     Glaucoma Father     Hypertension Father     Stroke Father     Diabetes Sister     Heart disease Sister     Lung disease Sister     Lung cancer Sister     Diabetes Daughter     Cancer Maternal Grandmother         unknown origin     Skin cancer Maternal Grandfather     No Known Problems Paternal Grandmother     No Known Problems " Paternal Grandfather     Bone cancer Sister     Heart disease Sister     Deep vein thrombosis Sister     No Known Problems Sister     No Known Problems Sister     No Known Problems Sister     No Known Problems Maternal Aunt     No Known Problems Maternal Aunt     No Known Problems Paternal Aunt     No Known Problems Paternal Aunt     No Known Problems Paternal Aunt     No Known Problems Paternal Aunt     No Known Problems Paternal Aunt     No Known Problems Paternal Aunt     No Known Problems Paternal Aunt     No Known Problems Paternal Aunt     No Known Problems Paternal Aunt     No Known Problems Paternal Aunt     Liver disease Brother     Lung disease Brother     Diabetes Son      Current Outpatient Medications on File Prior to Visit   Medication Sig Dispense Refill    albuterol (PROVENTIL HFA,VENTOLIN HFA) 90 mcg/act inhaler Inhale 2 puffs every 6 (six) hours as needed for wheezing 8.5 g 5    ALPRAZolam (XANAX) 0.5 mg tablet Take 1 tablet (0.5 mg total) by mouth 2 (two) times a day as needed for anxiety 60 tablet 0    aspirin 81 mg chewable tablet Chew 81 mg daily      azelastine (OPTIVAR) 0.05 % ophthalmic solution Administer 1 drop to both eyes in the morning 6 mL 0    Cetirizine HCl 10 MG CAPS Take 1 capsule (10 mg total) by mouth daily 30 capsule 0    citalopram (CeleXA) 20 mg tablet Take 1 tablet (20 mg total) by mouth daily 90 tablet 1    ergocalciferol (VITAMIN D2) 50,000 units Take 1 capsule (50,000 Units total) by mouth once a week 12 capsule 3    ezetimibe (ZETIA) 10 mg tablet Take 1 tablet (10 mg total) by mouth daily 90 tablet 3    fluticasone (FLONASE) 50 mcg/act nasal spray 1 spray into each nostril daily 15.8 mL 0    hydrOXYzine HCL (ATARAX) 10 mg tablet Take 1 tablet (10 mg total) by mouth every 8 (eight) hours as needed for itching or anxiety 180 tablet 1    ipratropium (ATROVENT) 0.06 % nasal spray 2 sprays into each nostril 2 (two) times a day 15 mL 0    levocetirizine (XYZAL) 5 MG tablet Take  1 tablet (5 mg total) by mouth every evening 90 tablet 3    metoprolol tartrate (LOPRESSOR) 50 mg tablet take 1 tablet by mouth every 12 hours 180 tablet 1    montelukast (SINGULAIR) 10 mg tablet take 1 tablet by mouth once daily 100 tablet 1     Current Facility-Administered Medications on File Prior to Visit   Medication Dose Route Frequency Provider Last Rate Last Admin    cyanocobalamin injection 1,000 mcg  1,000 mcg Intramuscular Q30 Days Stacey Mckeon,    1,000 mcg at 11/27/23 1002     Allergies   Allergen Reactions    Lorazepam Itching     Pt reports all over the body      Current Outpatient Medications on File Prior to Visit   Medication Sig Dispense Refill    albuterol (PROVENTIL HFA,VENTOLIN HFA) 90 mcg/act inhaler Inhale 2 puffs every 6 (six) hours as needed for wheezing 8.5 g 5    ALPRAZolam (XANAX) 0.5 mg tablet Take 1 tablet (0.5 mg total) by mouth 2 (two) times a day as needed for anxiety 60 tablet 0    aspirin 81 mg chewable tablet Chew 81 mg daily      azelastine (OPTIVAR) 0.05 % ophthalmic solution Administer 1 drop to both eyes in the morning 6 mL 0    Cetirizine HCl 10 MG CAPS Take 1 capsule (10 mg total) by mouth daily 30 capsule 0    citalopram (CeleXA) 20 mg tablet Take 1 tablet (20 mg total) by mouth daily 90 tablet 1    ergocalciferol (VITAMIN D2) 50,000 units Take 1 capsule (50,000 Units total) by mouth once a week 12 capsule 3    ezetimibe (ZETIA) 10 mg tablet Take 1 tablet (10 mg total) by mouth daily 90 tablet 3    fluticasone (FLONASE) 50 mcg/act nasal spray 1 spray into each nostril daily 15.8 mL 0    hydrOXYzine HCL (ATARAX) 10 mg tablet Take 1 tablet (10 mg total) by mouth every 8 (eight) hours as needed for itching or anxiety 180 tablet 1    ipratropium (ATROVENT) 0.06 % nasal spray 2 sprays into each nostril 2 (two) times a day 15 mL 0    levocetirizine (XYZAL) 5 MG tablet Take 1 tablet (5 mg total) by mouth every evening 90 tablet 3    metoprolol tartrate (LOPRESSOR) 50 mg  "tablet take 1 tablet by mouth every 12 hours 180 tablet 1    montelukast (SINGULAIR) 10 mg tablet take 1 tablet by mouth once daily 100 tablet 1     Current Facility-Administered Medications on File Prior to Visit   Medication Dose Route Frequency Provider Last Rate Last Admin    cyanocobalamin injection 1,000 mcg  1,000 mcg Intramuscular Q30 Days Stacey DO Linda   1,000 mcg at 11/27/23 1002      Social History     Tobacco Use    Smoking status: Every Day     Current packs/day: 2.00     Average packs/day: 2.0 packs/day for 40.0 years (80.0 ttl pk-yrs)     Types: Cigarettes    Smokeless tobacco: Never   Vaping Use    Vaping status: Never Used   Substance and Sexual Activity    Alcohol use: Not Currently     Comment: 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

## 2024-11-12 NOTE — ASSESSMENT & PLAN NOTE
Persistent diastases of the upper abdomen.  No obvious or gross hernia on exam.  There is some tenderness along the upper midline which I suspect could be related to intra-abdominal foregut pathology such as gallbladder disease.  However we will obtain CT scan to further evaluate to confirm that there is no underlying small hernia that is being missed.

## 2024-11-13 ENCOUNTER — APPOINTMENT (OUTPATIENT)
Dept: LAB | Facility: MEDICAL CENTER | Age: 59
End: 2024-11-13
Payer: COMMERCIAL

## 2024-11-13 ENCOUNTER — RESULTS FOLLOW-UP (OUTPATIENT)
Dept: SURGERY | Facility: CLINIC | Age: 59
End: 2024-11-13

## 2024-11-13 ENCOUNTER — RESULTS FOLLOW-UP (OUTPATIENT)
Dept: FAMILY MEDICINE CLINIC | Facility: CLINIC | Age: 59
End: 2024-11-13

## 2024-11-13 DIAGNOSIS — R10.11 RUQ PAIN: ICD-10-CM

## 2024-11-13 DIAGNOSIS — Z00.6 ENCOUNTER FOR EXAMINATION FOR NORMAL COMPARISON OR CONTROL IN CLINICAL RESEARCH PROGRAM: ICD-10-CM

## 2024-11-13 DIAGNOSIS — R23.2 HOT FLASHES: ICD-10-CM

## 2024-11-13 DIAGNOSIS — D58.2 ELEVATED HEMOGLOBIN (HCC): ICD-10-CM

## 2024-11-13 DIAGNOSIS — K43.9 VENTRAL HERNIA WITHOUT OBSTRUCTION OR GANGRENE: ICD-10-CM

## 2024-11-13 DIAGNOSIS — E78.5 HYPERLIPIDEMIA, UNSPECIFIED HYPERLIPIDEMIA TYPE: Primary | ICD-10-CM

## 2024-11-13 DIAGNOSIS — E78.5 HYPERLIPIDEMIA, UNSPECIFIED HYPERLIPIDEMIA TYPE: ICD-10-CM

## 2024-11-13 LAB
ALBUMIN SERPL BCG-MCNC: 3.7 G/DL (ref 3.5–5)
ALP SERPL-CCNC: 102 U/L (ref 34–104)
ALT SERPL W P-5'-P-CCNC: 12 U/L (ref 7–52)
ANION GAP SERPL CALCULATED.3IONS-SCNC: 10 MMOL/L (ref 4–13)
AST SERPL W P-5'-P-CCNC: 15 U/L (ref 13–39)
BILIRUB DIRECT SERPL-MCNC: 0.11 MG/DL (ref 0–0.2)
BILIRUB SERPL-MCNC: 0.51 MG/DL (ref 0.2–1)
BUN SERPL-MCNC: 13 MG/DL (ref 5–25)
CALCIUM SERPL-MCNC: 8.6 MG/DL (ref 8.4–10.2)
CHLORIDE SERPL-SCNC: 101 MMOL/L (ref 96–108)
CO2 SERPL-SCNC: 29 MMOL/L (ref 21–32)
CREAT SERPL-MCNC: 0.96 MG/DL (ref 0.6–1.3)
ERYTHROCYTE [DISTWIDTH] IN BLOOD BY AUTOMATED COUNT: 13.1 % (ref 11.6–15.1)
GFR SERPL CREATININE-BSD FRML MDRD: 65 ML/MIN/1.73SQ M
GLUCOSE P FAST SERPL-MCNC: 100 MG/DL (ref 65–99)
HCT VFR BLD AUTO: 50.4 % (ref 34.8–46.1)
HGB BLD-MCNC: 16.3 G/DL (ref 11.5–15.4)
LDLC SERPL DIRECT ASSAY-MCNC: 130 MG/DL (ref 0–100)
MCH RBC QN AUTO: 29.1 PG (ref 26.8–34.3)
MCHC RBC AUTO-ENTMCNC: 32.3 G/DL (ref 31.4–37.4)
MCV RBC AUTO: 90 FL (ref 82–98)
PLATELET # BLD AUTO: 325 THOUSANDS/UL (ref 149–390)
PMV BLD AUTO: 10.9 FL (ref 8.9–12.7)
POTASSIUM SERPL-SCNC: 3.7 MMOL/L (ref 3.5–5.3)
PROT SERPL-MCNC: 7.1 G/DL (ref 6.4–8.4)
RBC # BLD AUTO: 5.61 MILLION/UL (ref 3.81–5.12)
SODIUM SERPL-SCNC: 140 MMOL/L (ref 135–147)
TSH SERPL DL<=0.05 MIU/L-ACNC: 2.39 UIU/ML (ref 0.45–4.5)
WBC # BLD AUTO: 9.03 THOUSAND/UL (ref 4.31–10.16)

## 2024-11-13 PROCEDURE — 85027 COMPLETE CBC AUTOMATED: CPT

## 2024-11-13 PROCEDURE — 84443 ASSAY THYROID STIM HORMONE: CPT

## 2024-11-13 PROCEDURE — 82248 BILIRUBIN DIRECT: CPT

## 2024-11-13 PROCEDURE — 83721 ASSAY OF BLOOD LIPOPROTEIN: CPT

## 2024-11-13 PROCEDURE — 80053 COMPREHEN METABOLIC PANEL: CPT

## 2024-11-13 PROCEDURE — 36415 COLL VENOUS BLD VENIPUNCTURE: CPT

## 2024-11-15 ENCOUNTER — HOSPITAL ENCOUNTER (OUTPATIENT)
Dept: MAMMOGRAPHY | Facility: HOSPITAL | Age: 59
Discharge: HOME | End: 2024-11-15
Attending: INTERNAL MEDICINE
Payer: COMMERCIAL

## 2024-11-15 ENCOUNTER — APPOINTMENT (OUTPATIENT)
Dept: CT IMAGING | Facility: HOSPITAL | Age: 59
End: 2024-11-15
Attending: INTERNAL MEDICINE
Payer: COMMERCIAL

## 2024-11-15 ENCOUNTER — HOSPITAL ENCOUNTER (OUTPATIENT)
Dept: CT IMAGING | Facility: HOSPITAL | Age: 59
Discharge: HOME | End: 2024-11-15
Attending: SURGERY
Payer: COMMERCIAL

## 2024-11-15 ENCOUNTER — HOSPITAL ENCOUNTER (OUTPATIENT)
Dept: ULTRASOUND IMAGING | Facility: HOSPITAL | Age: 59
Discharge: HOME | End: 2024-11-15
Attending: SURGERY
Payer: COMMERCIAL

## 2024-11-15 VITALS — WEIGHT: 203 LBS | BODY MASS INDEX: 35.97 KG/M2 | HEIGHT: 63 IN

## 2024-11-15 DIAGNOSIS — R10.11 RUQ PAIN: ICD-10-CM

## 2024-11-15 DIAGNOSIS — K43.9 VENTRAL HERNIA WITHOUT OBSTRUCTION OR GANGRENE: ICD-10-CM

## 2024-11-15 DIAGNOSIS — Z12.31 ENCOUNTER FOR SCREENING MAMMOGRAM FOR MALIGNANT NEOPLASM OF BREAST: ICD-10-CM

## 2024-11-15 DIAGNOSIS — Z12.2 ENCOUNTER FOR SCREENING FOR LUNG CANCER: ICD-10-CM

## 2024-11-15 PROCEDURE — 74177 CT ABD & PELVIS W/CONTRAST: CPT

## 2024-11-15 PROCEDURE — 77063 BREAST TOMOSYNTHESIS BI: CPT

## 2024-11-15 PROCEDURE — 77067 SCR MAMMO BI INCL CAD: CPT

## 2024-11-15 PROCEDURE — 76705 ECHO EXAM OF ABDOMEN: CPT

## 2024-11-15 RX ADMIN — IOHEXOL 100 ML: 350 INJECTION, SOLUTION INTRAVENOUS at 08:50

## 2024-11-21 DIAGNOSIS — R10.11 RUQ PAIN: ICD-10-CM

## 2024-11-21 DIAGNOSIS — I71.40 ABDOMINAL AORTIC ANEURYSM (AAA) (HCC): Primary | ICD-10-CM

## 2024-11-21 NOTE — TELEPHONE ENCOUNTER
Informed Michelle her HIDA is scheduled for 11/27 @ 11am.  I gave her the fasting instructions for the scan.

## 2024-11-23 DIAGNOSIS — J98.01 BRONCHOSPASM: ICD-10-CM

## 2024-11-23 DIAGNOSIS — I10 ESSENTIAL HYPERTENSION: ICD-10-CM

## 2024-11-23 DIAGNOSIS — F41.9 ANXIETY: ICD-10-CM

## 2024-11-23 LAB
APOB+LDLR+PCSK9 GENE MUT ANL BLD/T: NOT DETECTED
BRCA1+BRCA2 DEL+DUP + FULL MUT ANL BLD/T: NOT DETECTED
MLH1+MSH2+MSH6+PMS2 GN DEL+DUP+FUL M: NOT DETECTED

## 2024-11-24 RX ORDER — CITALOPRAM HYDROBROMIDE 20 MG/1
20 TABLET ORAL DAILY
Qty: 90 TABLET | Refills: 0 | Status: SHIPPED | OUTPATIENT
Start: 2024-11-24

## 2024-11-24 RX ORDER — HYDROXYZINE HYDROCHLORIDE 10 MG/1
10 TABLET, FILM COATED ORAL EVERY 8 HOURS PRN
Qty: 180 TABLET | Refills: 0 | Status: SHIPPED | OUTPATIENT
Start: 2024-11-24

## 2024-11-24 RX ORDER — IPRATROPIUM BROMIDE 42 UG/1
2 SPRAY, METERED NASAL 2 TIMES DAILY
Qty: 15 ML | Refills: 0 | Status: SHIPPED | OUTPATIENT
Start: 2024-11-24

## 2024-11-24 RX ORDER — METOPROLOL TARTRATE 50 MG
50 TABLET ORAL EVERY 12 HOURS
Qty: 180 TABLET | Refills: 0 | Status: SHIPPED | OUTPATIENT
Start: 2024-11-24

## 2024-11-25 RX ORDER — ALPRAZOLAM 0.5 MG
0.5 TABLET ORAL 2 TIMES DAILY PRN
Qty: 60 TABLET | Refills: 0 | Status: SHIPPED | OUTPATIENT
Start: 2024-11-25

## 2024-11-27 ENCOUNTER — HOSPITAL ENCOUNTER (OUTPATIENT)
Dept: NUCLEAR MEDICINE | Facility: HOSPITAL | Age: 59
Discharge: HOME | End: 2024-11-27
Attending: SURGERY
Payer: COMMERCIAL

## 2024-11-27 DIAGNOSIS — R10.11 RUQ PAIN: ICD-10-CM

## 2024-11-27 PROCEDURE — A9537 TC99M MEBROFENIN: HCPCS

## 2024-11-27 PROCEDURE — 78227 HEPATOBIL SYST IMAGE W/DRUG: CPT

## 2024-11-27 RX ORDER — SINCALIDE 5 UG/5ML
0.02 INJECTION, POWDER, LYOPHILIZED, FOR SOLUTION INTRAVENOUS
Status: COMPLETED | OUTPATIENT
Start: 2024-11-27 | End: 2024-11-27

## 2024-11-27 RX ADMIN — SINCALIDE 1.8 MCG: 5 INJECTION, POWDER, LYOPHILIZED, FOR SOLUTION INTRAVENOUS at 12:03

## 2024-11-29 DIAGNOSIS — R05.9 COUGH: ICD-10-CM

## 2024-11-29 RX ORDER — ALBUTEROL SULFATE 90 UG/1
2 INHALANT RESPIRATORY (INHALATION) EVERY 6 HOURS PRN
Qty: 8.5 G | Refills: 5 | Status: SHIPPED | OUTPATIENT
Start: 2024-11-29

## 2024-12-01 ENCOUNTER — RESULTS FOLLOW-UP (OUTPATIENT)
Dept: OTHER | Facility: HOSPITAL | Age: 59
End: 2024-12-01

## 2024-12-04 ENCOUNTER — APPOINTMENT (OUTPATIENT)
Dept: LAB | Facility: HOSPITAL | Age: 59
End: 2024-12-04
Payer: COMMERCIAL

## 2024-12-04 ENCOUNTER — OFFICE VISIT (OUTPATIENT)
Dept: SURGERY | Facility: CLINIC | Age: 59
End: 2024-12-04
Payer: COMMERCIAL

## 2024-12-04 VITALS
TEMPERATURE: 97.6 F | WEIGHT: 203 LBS | OXYGEN SATURATION: 96 % | SYSTOLIC BLOOD PRESSURE: 142 MMHG | BODY MASS INDEX: 35.97 KG/M2 | HEIGHT: 63 IN | DIASTOLIC BLOOD PRESSURE: 86 MMHG | HEART RATE: 78 BPM

## 2024-12-04 DIAGNOSIS — I10 PRIMARY HYPERTENSION: ICD-10-CM

## 2024-12-04 DIAGNOSIS — K42.9 UMBILICAL HERNIA: ICD-10-CM

## 2024-12-04 DIAGNOSIS — K82.8 BILIARY DYSKINESIA: Primary | ICD-10-CM

## 2024-12-04 LAB
ATRIAL RATE: 78 BPM
P AXIS: 56 DEGREES
PR INTERVAL: 154 MS
QRS AXIS: 28 DEGREES
QRSD INTERVAL: 78 MS
QT INTERVAL: 412 MS
QTC INTERVAL: 470 MS
T WAVE AXIS: 72 DEGREES
VENTRICULAR RATE: 78 BPM

## 2024-12-04 PROCEDURE — 99244 OFF/OP CNSLTJ NEW/EST MOD 40: CPT | Performed by: SURGERY

## 2024-12-04 PROCEDURE — 93010 ELECTROCARDIOGRAM REPORT: CPT | Performed by: INTERNAL MEDICINE

## 2024-12-04 RX ORDER — HEPARIN SODIUM 5000 [USP'U]/ML
5000 INJECTION, SOLUTION INTRAVENOUS; SUBCUTANEOUS ONCE
Status: CANCELLED | OUTPATIENT
Start: 2024-12-09 | End: 2024-12-04

## 2024-12-04 RX ORDER — CEFAZOLIN SODIUM 2 G/50ML
2000 SOLUTION INTRAVENOUS ONCE
Status: CANCELLED | OUTPATIENT
Start: 2024-12-09 | End: 2024-12-04

## 2024-12-04 RX ORDER — FEXOFENADINE HCL AND PSEUDOEPHEDRINE HCL 180; 240 MG/1; MG/1
1 TABLET, EXTENDED RELEASE ORAL DAILY
COMMUNITY

## 2024-12-04 RX ORDER — SODIUM CHLORIDE, SODIUM LACTATE, POTASSIUM CHLORIDE, CALCIUM CHLORIDE 600; 310; 30; 20 MG/100ML; MG/100ML; MG/100ML; MG/100ML
125 INJECTION, SOLUTION INTRAVENOUS CONTINUOUS
Status: CANCELLED | OUTPATIENT
Start: 2024-12-09

## 2024-12-04 NOTE — ASSESSMENT & PLAN NOTE
Biliary dyskinesia, abdominal pain and bloating after meals in the right upper quadrant    Plan  -Laparoscopic cholecystectomy with umbilical hernia repair  -Risk benefits and alternatives to surgery discussed in the office today with patient.  Patient expressed understanding of risk and would like to proceed with surgical intervention.  Patient understands surgery may not correct her abdominal pain but is willing to proceed.  -Consent signed in office today    -11/13 lab work reviewed  -11/15 RUQ US reviewed, no cholelithiasis pericholecystic fluid or fat stranding  -11/15 CT A/P reviewed, no cholelithiasis or gallbladder pathology  -11/27 HIDA scan reviewed, no filling defect, normal scan, gallbladder EF 87%, no pain with CCK

## 2024-12-04 NOTE — ASSESSMENT & PLAN NOTE
History of hypertension    Plan  -Obtain EKG prior to laparoscopic cholecystectomy for preoperative clearance    -Prior EKG reviewed      Orders:    ECG 12 lead; Future

## 2024-12-04 NOTE — H&P
Name: Michelle Trevino      : 1965      MRN: [id number]  Encounter Provider: Saravanan Ramos DO  Encounter Date: 2024   Encounter department: [facility]  :  Assessment & Plan  Primary hypertension  History of hypertension    Plan  -Obtain EKG prior to laparoscopic cholecystectomy for preoperative clearance    -Prior EKG reviewed      Orders:    ECG 12 lead; Future    Biliary dyskinesia  Biliary dyskinesia, abdominal pain and bloating after meals in the right upper quadrant    Plan  -Laparoscopic cholecystectomy with umbilical hernia repair  -Risk benefits and alternatives to surgery discussed in the office today with patient.  Patient expressed understanding of risk and would like to proceed with surgical intervention.  Patient understands surgery may not correct her abdominal pain but is willing to proceed.  -Consent signed in office today    - lab work reviewed  -11/15 RUQ US reviewed, no cholelithiasis pericholecystic fluid or fat stranding  -11/15 CT A/P reviewed, no cholelithiasis or gallbladder pathology  - HIDA scan reviewed, no filling defect, normal scan, gallbladder EF 87%, no pain with CCK         Umbilical hernia  Umbilical hernia with palpable defect, visible on CT imaging from 11/15    Plan  -Umbilical hernia repair during laparoscopic cholecystectomy  -Consent obtained in office today             History of Present Illness    HPI  Michelle Trevino is a 58 y.o. female who presents for evaluation of her persistent bloating and right upper quad abdominal pain after eating.  Patient is aware of imaging results and would like to discuss surgery at this time.  Patient reports no changes since her last visit.  She continues to experience bloating and abdominal pain after eating.   History obtained from: patient    Review of Systems   Constitutional:  Negative for activity change, chills, fatigue and unexpected weight change.   Gastrointestinal:  Negative for abdominal  "distention and abdominal pain.     Pertinent Medical History        Medical History Reviewed by provider this encounter:     .  Past Medical History  Past Medical History:   Diagnosis Date    Age-related cataract of both eyes 10/21/2020    Anxiety     Arthritis     Breast mass     Breathing difficulty     Cancer (HCC)     Chronic serous otitis media, right ear     Colon polyp     Deviated septum     Hearing aid worn     left    Hyperlipidemia     Hypertension     Hypertrophy of nasal turbinates     Lung nodule     Mixed conductive and sensorineural hearing loss of both ears     Polycythemia     Shortness of breath     HUMPHREY    Stroke (HCC)     \"mini stroke yrs ago\" \"stress related\"     Past Surgical History:   Procedure Laterality Date    BREAST EXCISIONAL BIOPSY Right     benign    BREAST EXCISIONAL BIOPSY Left     benign    CATARACT EXTRACTION      CATARACT EXTRACTION, BILATERAL      COLONOSCOPY      HYSTERECTOMY      [place] ADENOIDECTOMY PRIMARY AGE 12/> N/A 10/31/2022    Procedure: ADENOIDECTOMY;  Surgeon: Douglas Shah MD;  [facility];  Service: ENT    NM NASAL/SINUS NDSC SURG W/NICOLE BULLOSA RESECTION N/A 10/31/2022    Procedure: EXCISION OF LEFT NICOLE BULLOSA, BILATERAL MIDDLE MEATUS ANTROSTOMIES, BILATERAL ANTERIOR ETHMOIDECTOMIES WITH FUSION;  Surgeon: Douglas Shah MD;  [facility];  Service: ENT    NM SEPTOPLASTY/SUBMUCOUS RESECJ W/WO CARTILAGE GRF N/A 10/31/2022    Procedure: SEPTOPLASTY, BILATERAL INFERIOR TURBINATE COBLATION;  Surgeon: Douglas Shah MD;  [facility];  Service: ENT    NM TYMPANOSTOMY GENERAL ANESTHESIA Bilateral 10/31/2022    Procedure: MYRINGOTOMY WITH TUBES;  Surgeon: Douglas Shah MD;  [facility];  Service: ENT    NM TYMPANOSTOMY GENERAL ANESTHESIA Bilateral 12/11/2023    Procedure: MYRINGOTOMY WITH TUBES;  Surgeon: Douglas Shah MD;  [facility];  Service: ENT    TUBAL LIGATION       Family History   Problem Relation Age of Onset    " Hypertension Mother     Stroke Mother     Heart disease Mother     Parkinsonism Mother     Dementia Mother     Coronary artery disease Father     Glaucoma Father     Hypertension Father     Stroke Father     Diabetes Sister     Heart disease Sister     Lung disease Sister     Lung cancer Sister     Diabetes Daughter     Cancer Maternal Grandmother         unknown origin     Skin cancer Maternal Grandfather     No Known Problems Paternal Grandmother     No Known Problems Paternal Grandfather     Bone cancer Sister     Heart disease Sister     Deep vein thrombosis Sister     No Known Problems Sister     No Known Problems Sister     No Known Problems Sister     No Known Problems Maternal Aunt     No Known Problems Maternal Aunt     No Known Problems Paternal Aunt     No Known Problems Paternal Aunt     No Known Problems Paternal Aunt     No Known Problems Paternal Aunt     No Known Problems Paternal Aunt     No Known Problems Paternal Aunt     No Known Problems Paternal Aunt     No Known Problems Paternal Aunt     No Known Problems Paternal Aunt     No Known Problems Paternal Aunt     Liver disease Brother     Lung disease Brother     Diabetes Son       reports that she has been smoking cigarettes. She has a 80 pack-year smoking history. She has never used smokeless tobacco. She reports that she does not currently use alcohol. She reports that she does not use drugs.  Current Outpatient Medications on File Prior to Visit   Medication Sig Dispense Refill    albuterol (PROVENTIL HFA,VENTOLIN HFA) 90 mcg/act inhaler Inhale 2 puffs every 6 (six) hours as needed for wheezing 8.5 g 5    ALPRAZolam (XANAX) 0.5 mg tablet Take 1 tablet (0.5 mg total) by mouth 2 (two) times a day as needed for anxiety 60 tablet 0    aspirin 81 mg chewable tablet Chew 81 mg daily      azelastine (OPTIVAR) 0.05 % ophthalmic solution Administer 1 drop to both eyes in the morning 6 mL 0    Cetirizine HCl 10 MG CAPS Take 1 capsule (10 mg total) by  mouth daily 30 capsule 0    citalopram (CeleXA) 20 mg tablet Take 1 tablet (20 mg total) by mouth daily 90 tablet 0    ergocalciferol (VITAMIN D2) 50,000 units Take 1 capsule (50,000 Units total) by mouth once a week 12 capsule 3    ezetimibe (ZETIA) 10 mg tablet Take 1 tablet (10 mg total) by mouth daily 90 tablet 3    fluticasone (FLONASE) 50 mcg/act nasal spray 1 spray into each nostril daily 15.8 mL 0    hydrOXYzine HCL (ATARAX) 10 mg tablet Take 1 tablet (10 mg total) by mouth every 8 (eight) hours as needed for itching or anxiety 180 tablet 0    ipratropium (ATROVENT) 0.06 % nasal spray 2 sprays into each nostril 2 (two) times a day 15 mL 0    levocetirizine (XYZAL) 5 MG tablet Take 1 tablet (5 mg total) by mouth every evening 90 tablet 3    metoprolol tartrate (LOPRESSOR) 50 mg tablet Take 1 tablet (50 mg total) by mouth every 12 (twelve) hours 180 tablet 0    montelukast (SINGULAIR) 10 mg tablet take 1 tablet by mouth once daily 100 tablet 1     Current Facility-Administered Medications on File Prior to Visit   Medication Dose Route Frequency Provider Last Rate Last Admin    cyanocobalamin injection 1,000 mcg  1,000 mcg Intramuscular Q30 Days Stacey Mckeon DO   1,000 mcg at 11/27/23 1002     Allergies   Allergen Reactions    Lorazepam Itching     Pt reports all over the body      Current Outpatient Medications on File Prior to Visit   Medication Sig Dispense Refill    albuterol (PROVENTIL HFA,VENTOLIN HFA) 90 mcg/act inhaler Inhale 2 puffs every 6 (six) hours as needed for wheezing 8.5 g 5    ALPRAZolam (XANAX) 0.5 mg tablet Take 1 tablet (0.5 mg total) by mouth 2 (two) times a day as needed for anxiety 60 tablet 0    aspirin 81 mg chewable tablet Chew 81 mg daily      azelastine (OPTIVAR) 0.05 % ophthalmic solution Administer 1 drop to both eyes in the morning 6 mL 0    Cetirizine HCl 10 MG CAPS Take 1 capsule (10 mg total) by mouth daily 30 capsule 0    citalopram (CeleXA) 20 mg tablet Take 1 tablet  "(20 mg total) by mouth daily 90 tablet 0    ergocalciferol (VITAMIN D2) 50,000 units Take 1 capsule (50,000 Units total) by mouth once a week 12 capsule 3    ezetimibe (ZETIA) 10 mg tablet Take 1 tablet (10 mg total) by mouth daily 90 tablet 3    fluticasone (FLONASE) 50 mcg/act nasal spray 1 spray into each nostril daily 15.8 mL 0    hydrOXYzine HCL (ATARAX) 10 mg tablet Take 1 tablet (10 mg total) by mouth every 8 (eight) hours as needed for itching or anxiety 180 tablet 0    ipratropium (ATROVENT) 0.06 % nasal spray 2 sprays into each nostril 2 (two) times a day 15 mL 0    levocetirizine (XYZAL) 5 MG tablet Take 1 tablet (5 mg total) by mouth every evening 90 tablet 3    metoprolol tartrate (LOPRESSOR) 50 mg tablet Take 1 tablet (50 mg total) by mouth every 12 (twelve) hours 180 tablet 0    montelukast (SINGULAIR) 10 mg tablet take 1 tablet by mouth once daily 100 tablet 1     Current Facility-Administered Medications on File Prior to Visit   Medication Dose Route Frequency Provider Last Rate Last Admin    cyanocobalamin injection 1,000 mcg  1,000 mcg Intramuscular Q30 Days Stacey Mckeon DO   1,000 mcg at 11/27/23 1002      Social History     Tobacco Use    Smoking status: Every Day     Current packs/day: 2.00     Average packs/day: 2.0 packs/day for 40.0 years (80.0 ttl pk-yrs)     Types: Cigarettes    Smokeless tobacco: Never   Vaping Use    Vaping status: Never Used   Substance and Sexual Activity    Alcohol use: Not Currently     Comment: 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   Constitutional:       General: She is not in acute " distress.     Appearance: Normal appearance. She is normal weight. She is not ill-appearing or toxic-appearing.   HENT:      Head: Normocephalic and atraumatic.      Nose: Nose normal.   Eyes:      General: No scleral icterus.     Conjunctiva/sclera: Conjunctivae normal.   Cardiovascular:      Rate and Rhythm: Normal rate.   Pulmonary:      Effort: Pulmonary effort is normal.      Comments: On room air   Abdominal:      General: There is distension.      Palpations: Abdomen is soft.      Tenderness: There is abdominal tenderness (RUQ). There is no guarding or rebound.      Hernia: A hernia (umbilical) is present.   Musculoskeletal:      Right lower leg: No edema.      Left lower leg: No edema.   Skin:     General: Skin is warm.      Capillary Refill: Capillary refill takes less than 2 seconds.   Neurological:      Mental Status: She is alert and oriented to person, place, and time.   Psychiatric:         Mood and Affect: Mood normal.         Behavior: Behavior normal.         Thought Content: Thought content normal.

## 2024-12-04 NOTE — H&P (VIEW-ONLY)
Name: Michelle Trevino      : 1965      MRN: [id number]  Encounter Provider: Saravanan Ramos DO  Encounter Date: 2024   Encounter department: [facility]  :  Assessment & Plan  Primary hypertension  History of hypertension    Plan  -Obtain EKG prior to laparoscopic cholecystectomy for preoperative clearance    -Prior EKG reviewed      Orders:    ECG 12 lead; Future    Biliary dyskinesia  Biliary dyskinesia, abdominal pain and bloating after meals in the right upper quadrant    Plan  -Laparoscopic cholecystectomy with umbilical hernia repair  -Risk benefits and alternatives to surgery discussed in the office today with patient.  Patient expressed understanding of risk and would like to proceed with surgical intervention.  Patient understands surgery may not correct her abdominal pain but is willing to proceed.  -Consent signed in office today    - lab work reviewed  -11/15 RUQ US reviewed, no cholelithiasis pericholecystic fluid or fat stranding  -11/15 CT A/P reviewed, no cholelithiasis or gallbladder pathology  - HIDA scan reviewed, no filling defect, normal scan, gallbladder EF 87%, no pain with CCK         Umbilical hernia  Umbilical hernia with palpable defect, visible on CT imaging from 11/15    Plan  -Umbilical hernia repair during laparoscopic cholecystectomy  -Consent obtained in office today             History of Present Illness    HPI  Michelle Trevino is a 58 y.o. female who presents for evaluation of her persistent bloating and right upper quad abdominal pain after eating.  Patient is aware of imaging results and would like to discuss surgery at this time.  Patient reports no changes since her last visit.  She continues to experience bloating and abdominal pain after eating.   History obtained from: patient    Review of Systems   Constitutional:  Negative for activity change, chills, fatigue and unexpected weight change.   Gastrointestinal:  Negative for abdominal  "distention and abdominal pain.     Pertinent Medical History        Medical History Reviewed by provider this encounter:     .  Past Medical History  Past Medical History:   Diagnosis Date    Age-related cataract of both eyes 10/21/2020    Anxiety     Arthritis     Breast mass     Breathing difficulty     Cancer (HCC)     Chronic serous otitis media, right ear     Colon polyp     Deviated septum     Hearing aid worn     left    Hyperlipidemia     Hypertension     Hypertrophy of nasal turbinates     Lung nodule     Mixed conductive and sensorineural hearing loss of both ears     Polycythemia     Shortness of breath     HUMPHREY    Stroke (HCC)     \"mini stroke yrs ago\" \"stress related\"     Past Surgical History:   Procedure Laterality Date    BREAST EXCISIONAL BIOPSY Right     benign    BREAST EXCISIONAL BIOPSY Left     benign    CATARACT EXTRACTION      CATARACT EXTRACTION, BILATERAL      COLONOSCOPY      HYSTERECTOMY      WY ADENOIDECTOMY PRIMARY AGE 12/> N/A 10/31/2022    Procedure: ADENOIDECTOMY;  Surgeon: Douglas Shah MD;  [facility];  Service: ENT    [place] NASAL/SINUS NDSC SURG W/NICOLE BULLOSA RESECTION N/A 10/31/2022    Procedure: EXCISION OF LEFT NICOLE BULLOSA, BILATERAL MIDDLE MEATUS ANTROSTOMIES, BILATERAL ANTERIOR ETHMOIDECTOMIES WITH FUSION;  Surgeon: Douglas Shah MD;  [facility]: [facility];  Service: ENT    [place] SEPTOPLASTY/SUBMUCOUS RESECJ W/WO CARTILAGE GRF N/A 10/31/2022    Procedure: SEPTOPLASTY, BILATERAL INFERIOR TURBINATE COBLATION;  Surgeon: Douglas Shah MD;  [facility];  Service: ENT    WY TYMPANOSTOMY GENERAL ANESTHESIA Bilateral 10/31/2022    Procedure: MYRINGOTOMY WITH TUBES;  Surgeon: Douglas Shah MD;  [facility];  Service: ENT    WY TYMPANOSTOMY GENERAL ANESTHESIA Bilateral 12/11/2023    Procedure: MYRINGOTOMY WITH TUBES;  Surgeon: Douglas Shah MD;  [facility];  Service: ENT    TUBAL LIGATION       Family History   Problem Relation Age of Onset    " Hypertension Mother     Stroke Mother     Heart disease Mother     Parkinsonism Mother     Dementia Mother     Coronary artery disease Father     Glaucoma Father     Hypertension Father     Stroke Father     Diabetes Sister     Heart disease Sister     Lung disease Sister     Lung cancer Sister     Diabetes Daughter     Cancer Maternal Grandmother         unknown origin     Skin cancer Maternal Grandfather     No Known Problems Paternal Grandmother     No Known Problems Paternal Grandfather     Bone cancer Sister     Heart disease Sister     Deep vein thrombosis Sister     No Known Problems Sister     No Known Problems Sister     No Known Problems Sister     No Known Problems Maternal Aunt     No Known Problems Maternal Aunt     No Known Problems Paternal Aunt     No Known Problems Paternal Aunt     No Known Problems Paternal Aunt     No Known Problems Paternal Aunt     No Known Problems Paternal Aunt     No Known Problems Paternal Aunt     No Known Problems Paternal Aunt     No Known Problems Paternal Aunt     No Known Problems Paternal Aunt     No Known Problems Paternal Aunt     Liver disease Brother     Lung disease Brother     Diabetes Son       reports that she has been smoking cigarettes. She has a 80 pack-year smoking history. She has never used smokeless tobacco. She reports that she does not currently use alcohol. She reports that she does not use drugs.  Current Outpatient Medications on File Prior to Visit   Medication Sig Dispense Refill    albuterol (PROVENTIL HFA,VENTOLIN HFA) 90 mcg/act inhaler Inhale 2 puffs every 6 (six) hours as needed for wheezing 8.5 g 5    ALPRAZolam (XANAX) 0.5 mg tablet Take 1 tablet (0.5 mg total) by mouth 2 (two) times a day as needed for anxiety 60 tablet 0    aspirin 81 mg chewable tablet Chew 81 mg daily      azelastine (OPTIVAR) 0.05 % ophthalmic solution Administer 1 drop to both eyes in the morning 6 mL 0    Cetirizine HCl 10 MG CAPS Take 1 capsule (10 mg total) by  mouth daily 30 capsule 0    citalopram (CeleXA) 20 mg tablet Take 1 tablet (20 mg total) by mouth daily 90 tablet 0    ergocalciferol (VITAMIN D2) 50,000 units Take 1 capsule (50,000 Units total) by mouth once a week 12 capsule 3    ezetimibe (ZETIA) 10 mg tablet Take 1 tablet (10 mg total) by mouth daily 90 tablet 3    fluticasone (FLONASE) 50 mcg/act nasal spray 1 spray into each nostril daily 15.8 mL 0    hydrOXYzine HCL (ATARAX) 10 mg tablet Take 1 tablet (10 mg total) by mouth every 8 (eight) hours as needed for itching or anxiety 180 tablet 0    ipratropium (ATROVENT) 0.06 % nasal spray 2 sprays into each nostril 2 (two) times a day 15 mL 0    levocetirizine (XYZAL) 5 MG tablet Take 1 tablet (5 mg total) by mouth every evening 90 tablet 3    metoprolol tartrate (LOPRESSOR) 50 mg tablet Take 1 tablet (50 mg total) by mouth every 12 (twelve) hours 180 tablet 0    montelukast (SINGULAIR) 10 mg tablet take 1 tablet by mouth once daily 100 tablet 1     Current Facility-Administered Medications on File Prior to Visit   Medication Dose Route Frequency Provider Last Rate Last Admin    cyanocobalamin injection 1,000 mcg  1,000 mcg Intramuscular Q30 Days Stacey Mckeon DO   1,000 mcg at 11/27/23 1002     Allergies   Allergen Reactions    Lorazepam Itching     Pt reports all over the body      Current Outpatient Medications on File Prior to Visit   Medication Sig Dispense Refill    albuterol (PROVENTIL HFA,VENTOLIN HFA) 90 mcg/act inhaler Inhale 2 puffs every 6 (six) hours as needed for wheezing 8.5 g 5    ALPRAZolam (XANAX) 0.5 mg tablet Take 1 tablet (0.5 mg total) by mouth 2 (two) times a day as needed for anxiety 60 tablet 0    aspirin 81 mg chewable tablet Chew 81 mg daily      azelastine (OPTIVAR) 0.05 % ophthalmic solution Administer 1 drop to both eyes in the morning 6 mL 0    Cetirizine HCl 10 MG CAPS Take 1 capsule (10 mg total) by mouth daily 30 capsule 0    citalopram (CeleXA) 20 mg tablet Take 1 tablet  "(20 mg total) by mouth daily 90 tablet 0    ergocalciferol (VITAMIN D2) 50,000 units Take 1 capsule (50,000 Units total) by mouth once a week 12 capsule 3    ezetimibe (ZETIA) 10 mg tablet Take 1 tablet (10 mg total) by mouth daily 90 tablet 3    fluticasone (FLONASE) 50 mcg/act nasal spray 1 spray into each nostril daily 15.8 mL 0    hydrOXYzine HCL (ATARAX) 10 mg tablet Take 1 tablet (10 mg total) by mouth every 8 (eight) hours as needed for itching or anxiety 180 tablet 0    ipratropium (ATROVENT) 0.06 % nasal spray 2 sprays into each nostril 2 (two) times a day 15 mL 0    levocetirizine (XYZAL) 5 MG tablet Take 1 tablet (5 mg total) by mouth every evening 90 tablet 3    metoprolol tartrate (LOPRESSOR) 50 mg tablet Take 1 tablet (50 mg total) by mouth every 12 (twelve) hours 180 tablet 0    montelukast (SINGULAIR) 10 mg tablet take 1 tablet by mouth once daily 100 tablet 1     Current Facility-Administered Medications on File Prior to Visit   Medication Dose Route Frequency Provider Last Rate Last Admin    cyanocobalamin injection 1,000 mcg  1,000 mcg Intramuscular Q30 Days Stacey Mckeon DO   1,000 mcg at 11/27/23 1002      Social History     Tobacco Use    Smoking status: Every Day     Current packs/day: 2.00     Average packs/day: 2.0 packs/day for 40.0 years (80.0 ttl pk-yrs)     Types: Cigarettes    Smokeless tobacco: Never   Vaping Use    Vaping status: Never Used   Substance and Sexual Activity    Alcohol use: Not Currently     Comment: 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   Constitutional:       General: She is not in acute " distress.     Appearance: Normal appearance. She is normal weight. She is not ill-appearing or toxic-appearing.   HENT:      Head: Normocephalic and atraumatic.      Nose: Nose normal.   Eyes:      General: No scleral icterus.     Conjunctiva/sclera: Conjunctivae normal.   Cardiovascular:      Rate and Rhythm: Normal rate.   Pulmonary:      Effort: Pulmonary effort is normal.      Comments: On room air   Abdominal:      General: There is distension.      Palpations: Abdomen is soft.      Tenderness: There is abdominal tenderness (RUQ). There is no guarding or rebound.      Hernia: A hernia (umbilical) is present.   Musculoskeletal:      Right lower leg: No edema.      Left lower leg: No edema.   Skin:     General: Skin is warm.      Capillary Refill: Capillary refill takes less than 2 seconds.   Neurological:      Mental Status: She is alert and oriented to person, place, and time.   Psychiatric:         Mood and Affect: Mood normal.         Behavior: Behavior normal.         Thought Content: Thought content normal.

## 2024-12-04 NOTE — ASSESSMENT & PLAN NOTE
Umbilical hernia with palpable defect, visible on CT imaging from 11/15    Plan  -Umbilical hernia repair during laparoscopic cholecystectomy  -Consent obtained in office today

## 2024-12-04 NOTE — PRE-PROCEDURE INSTRUCTIONS
Pre-Surgery Instructions:   Medication Instructions    albuterol (PROVENTIL HFA,VENTOLIN HFA) 90 mcg/act inhaler Uses PRN- OK to take day of surgery    ALPRAZolam (XANAX) 0.5 mg tablet Uses PRN- OK to take day of surgery    aspirin 81 mg chewable tablet Stop taking 5 days prior to surgery.    azelastine (OPTIVAR) 0.05 % ophthalmic solution Take day of surgery.    Cetirizine HCl 10 MG CAPS Take day of surgery.    citalopram (CeleXA) 20 mg tablet Take night before surgery    ergocalciferol (VITAMIN D2) 50,000 units Stop taking 7 days prior to surgery.    ezetimibe (ZETIA) 10 mg tablet Take day of surgery.    fexofenadine-pseudoephedrine (ALLEGRA-D 24) 180-240 MG per 24 hr tablet Take day of surgery.    fluticasone (FLONASE) 50 mcg/act nasal spray Take day of surgery.    hydrOXYzine HCL (ATARAX) 10 mg tablet Uses PRN- OK to take day of surgery    ipratropium (ATROVENT) 0.06 % nasal spray Take day of surgery.    levocetirizine (XYZAL) 5 MG tablet Uses PRN- OK to take day of surgery    metoprolol tartrate (LOPRESSOR) 50 mg tablet Take day of surgery.    montelukast (SINGULAIR) 10 mg tablet Uses PRN- OK to take day of surgery    Naproxen Sodium (ALEVE PO) Stop taking 3 days prior to surgery.     Pt verbalizes understanding of the following:    Please reference your “My Surgical Experience Booklet” for additional information to prepare for your upcoming surgery.      - DO NOT EAT OR DRINK ANYTHING after midnight on the evening before your procedure including coffee, tea, gum or hard candy.    - ONLY SIPS OF WATER with your medications are allowed on the morning of your procedure.  - Avoid OTC non-directed Vit/ Suppl/ Herbals 7 days prior to surgery to ensure no drug interactions with perioperative surgical/ anesthetic meds  - Avoid NSAIDs 3 days prior. Tylenol is ok to take as needed.   - Avoid ASA containing products 5 days prior, unless otherwise instructed by your provider   - Continue statin medication up through and  including the day of surgery  - Bring a list of meds you take at home with your last dose noted  - Bring INHALER you take for breathing problems     - Avoid alcohol & cigarette smoking 24hrs before your surgery. Avoid marijuana 12hrs before your surgery.       - Follow the pre surgery showering instructions as listed in the “My Surgical Experience Booklet” or otherwise provided by your surgeon's office.  - Bathing instructions, has chg, neck down, no genitals  - No lotions, powders, sprays, deodorant, perfume, jewelry, body piercings, false lashes or make-up  - Do not use a blade to shave the surgical area 1 week before surgery. It is ok to use clean electric clippers up to 24 hours before surgery. Do not shave any body parts with a razor within 24hrs.  - Do not use dry shampoo, hair spray, hair gel, or any type of hair products.   - Remove nail polish, including gel polish, and any artificial, gel, or acrylic nails if possible.    - For outpatient surgery, arrange for someone to drive you home after the procedure & stay with you until the next morning. Visitor guidelines discussed.   - Bring insurance cards & photo id    - Leave all valuables such as credit cards, money & jewelry at home  - Wear causal clothing that is easy to take on and off. Consider your type of surgery.    - Notify surgeon if you develop any new illnesses, exposure, develop a rash/ open wounds or have additional questions prior to your surgery.    - Did the surgeon's office give you any other special instructions? no  - Did surgeon require any clearances? no    You will receive a call one business day prior to surgery with an arrival time and hospital directions. If your surgery is scheduled on a Monday, the hospital will be calling you on the Friday prior to your surgery.     Please confirm the visitor policy for the day of your procedure when you receive your phone call with an arrival time.

## 2024-12-04 NOTE — PROGRESS NOTES
Name: Michelle Trevino      : 1965      MRN: [id number]  Encounter Provider: Saravanan Ramos DO  Encounter Date: 2024   Encounter department: [facility]  :  Assessment & Plan  Primary hypertension  History of hypertension    Plan  -Obtain EKG prior to laparoscopic cholecystectomy for preoperative clearance    -Prior EKG reviewed      Orders:    ECG 12 lead; Future    Biliary dyskinesia  Biliary dyskinesia, abdominal pain and bloating after meals in the right upper quadrant    Plan  -Laparoscopic cholecystectomy with umbilical hernia repair  -Risk benefits and alternatives to surgery discussed in the office today with patient.  Patient expressed understanding of risk and would like to proceed with surgical intervention.  Patient understands surgery may not correct her abdominal pain but is willing to proceed.  -Consent signed in office today    - lab work reviewed  -11/15 RUQ US reviewed, no cholelithiasis pericholecystic fluid or fat stranding  -11/15 CT A/P reviewed, no cholelithiasis or gallbladder pathology  - HIDA scan reviewed, no filling defect, normal scan, gallbladder EF 87%, no pain with CCK         Umbilical hernia  Umbilical hernia with palpable defect, visible on CT imaging from 11/15    Plan  -Umbilical hernia repair during laparoscopic cholecystectomy  -Consent obtained in office today             History of Present Illness     HPI  Michelle Trevino is a 58 y.o. female who presents for evaluation of her persistent bloating and right upper quad abdominal pain after eating.  Patient is aware of imaging results and would like to discuss surgery at this time.  Patient reports no changes since her last visit.  She continues to experience bloating and abdominal pain after eating.   History obtained from: patient    Review of Systems   Constitutional:  Negative for activity change, chills, fatigue and unexpected weight change.   Gastrointestinal:  Negative for abdominal  "distention and abdominal pain.     Pertinent Medical History         Medical History Reviewed by provider this encounter:     .  Past Medical History   Past Medical History:   Diagnosis Date    Age-related cataract of both eyes 10/21/2020    Anxiety     Arthritis     Breast mass     Breathing difficulty     Cancer (HCC)     Chronic serous otitis media, right ear     Colon polyp     Deviated septum     Hearing aid worn     left    Hyperlipidemia     Hypertension     Hypertrophy of nasal turbinates     Lung nodule     Mixed conductive and sensorineural hearing loss of both ears     Polycythemia     Shortness of breath     HUMPHREY    Stroke (HCC)     \"mini stroke yrs ago\" \"stress related\"     Past Surgical History:   Procedure Laterality Date    BREAST EXCISIONAL BIOPSY Right     benign    BREAST EXCISIONAL BIOPSY Left     benign    CATARACT EXTRACTION      CATARACT EXTRACTION, BILATERAL      COLONOSCOPY      HYSTERECTOMY      KS ADENOIDECTOMY PRIMARY AGE 12/> N/A 10/31/2022    Procedure: ADENOIDECTOMY;  Surgeon: Douglas Shah MD;  Location: [facility];  Service: ENT    KS NASAL/SINUS NDSC SURG W/NICOLE BULLOSA RESECTION N/A 10/31/2022    Procedure: EXCISION OF LEFT NICOLE BULLOSA, BILATERAL MIDDLE MEATUS ANTROSTOMIES, BILATERAL ANTERIOR ETHMOIDECTOMIES WITH FUSION;  Surgeon: Douglas Shah MD;  Location: [facility];  Service: ENT    KS SEPTOPLASTY/SUBMUCOUS RESECJ W/WO CARTILAGE GRF N/A 10/31/2022    Procedure: SEPTOPLASTY, BILATERAL INFERIOR TURBINATE COBLATION;  Surgeon: Douglas Shah MD;  Location: [facility];  Service: ENT    KS TYMPANOSTOMY GENERAL ANESTHESIA Bilateral 10/31/2022    Procedure: MYRINGOTOMY WITH TUBES;  Surgeon: Douglas Shah MD;  Location: [facility];  Service: ENT    KS TYMPANOSTOMY GENERAL ANESTHESIA Bilateral 12/11/2023    Procedure: MYRINGOTOMY WITH TUBES;  Surgeon: Douglas Shah MD;  Location: [facility];  Service: ENT    TUBAL LIGATION       Family History   Problem Relation Age of Onset    " Hypertension Mother     Stroke Mother     Heart disease Mother     Parkinsonism Mother     Dementia Mother     Coronary artery disease Father     Glaucoma Father     Hypertension Father     Stroke Father     Diabetes Sister     Heart disease Sister     Lung disease Sister     Lung cancer Sister     Diabetes Daughter     Cancer Maternal Grandmother         unknown origin     Skin cancer Maternal Grandfather     No Known Problems Paternal Grandmother     No Known Problems Paternal Grandfather     Bone cancer Sister     Heart disease Sister     Deep vein thrombosis Sister     No Known Problems Sister     No Known Problems Sister     No Known Problems Sister     No Known Problems Maternal Aunt     No Known Problems Maternal Aunt     No Known Problems Paternal Aunt     No Known Problems Paternal Aunt     No Known Problems Paternal Aunt     No Known Problems Paternal Aunt     No Known Problems Paternal Aunt     No Known Problems Paternal Aunt     No Known Problems Paternal Aunt     No Known Problems Paternal Aunt     No Known Problems Paternal Aunt     No Known Problems Paternal Aunt     Liver disease Brother     Lung disease Brother     Diabetes Son       reports that she has been smoking cigarettes. She has a 80 pack-year smoking history. She has never used smokeless tobacco. She reports that she does not currently use alcohol. She reports that she does not use drugs.  Current Outpatient Medications on File Prior to Visit   Medication Sig Dispense Refill    albuterol (PROVENTIL HFA,VENTOLIN HFA) 90 mcg/act inhaler Inhale 2 puffs every 6 (six) hours as needed for wheezing 8.5 g 5    ALPRAZolam (XANAX) 0.5 mg tablet Take 1 tablet (0.5 mg total) by mouth 2 (two) times a day as needed for anxiety 60 tablet 0    aspirin 81 mg chewable tablet Chew 81 mg daily      azelastine (OPTIVAR) 0.05 % ophthalmic solution Administer 1 drop to both eyes in the morning 6 mL 0    Cetirizine HCl 10 MG CAPS Take 1 capsule (10 mg total) by  mouth daily 30 capsule 0    citalopram (CeleXA) 20 mg tablet Take 1 tablet (20 mg total) by mouth daily 90 tablet 0    ergocalciferol (VITAMIN D2) 50,000 units Take 1 capsule (50,000 Units total) by mouth once a week 12 capsule 3    ezetimibe (ZETIA) 10 mg tablet Take 1 tablet (10 mg total) by mouth daily 90 tablet 3    fluticasone (FLONASE) 50 mcg/act nasal spray 1 spray into each nostril daily 15.8 mL 0    hydrOXYzine HCL (ATARAX) 10 mg tablet Take 1 tablet (10 mg total) by mouth every 8 (eight) hours as needed for itching or anxiety 180 tablet 0    ipratropium (ATROVENT) 0.06 % nasal spray 2 sprays into each nostril 2 (two) times a day 15 mL 0    levocetirizine (XYZAL) 5 MG tablet Take 1 tablet (5 mg total) by mouth every evening 90 tablet 3    metoprolol tartrate (LOPRESSOR) 50 mg tablet Take 1 tablet (50 mg total) by mouth every 12 (twelve) hours 180 tablet 0    montelukast (SINGULAIR) 10 mg tablet take 1 tablet by mouth once daily 100 tablet 1     Current Facility-Administered Medications on File Prior to Visit   Medication Dose Route Frequency Provider Last Rate Last Admin    cyanocobalamin injection 1,000 mcg  1,000 mcg Intramuscular Q30 Days Stacey Mckeon DO   1,000 mcg at 11/27/23 1002     Allergies   Allergen Reactions    Lorazepam Itching     Pt reports all over the body      Current Outpatient Medications on File Prior to Visit   Medication Sig Dispense Refill    albuterol (PROVENTIL HFA,VENTOLIN HFA) 90 mcg/act inhaler Inhale 2 puffs every 6 (six) hours as needed for wheezing 8.5 g 5    ALPRAZolam (XANAX) 0.5 mg tablet Take 1 tablet (0.5 mg total) by mouth 2 (two) times a day as needed for anxiety 60 tablet 0    aspirin 81 mg chewable tablet Chew 81 mg daily      azelastine (OPTIVAR) 0.05 % ophthalmic solution Administer 1 drop to both eyes in the morning 6 mL 0    Cetirizine HCl 10 MG CAPS Take 1 capsule (10 mg total) by mouth daily 30 capsule 0    citalopram (CeleXA) 20 mg tablet Take 1 tablet  "(20 mg total) by mouth daily 90 tablet 0    ergocalciferol (VITAMIN D2) 50,000 units Take 1 capsule (50,000 Units total) by mouth once a week 12 capsule 3    ezetimibe (ZETIA) 10 mg tablet Take 1 tablet (10 mg total) by mouth daily 90 tablet 3    fluticasone (FLONASE) 50 mcg/act nasal spray 1 spray into each nostril daily 15.8 mL 0    hydrOXYzine HCL (ATARAX) 10 mg tablet Take 1 tablet (10 mg total) by mouth every 8 (eight) hours as needed for itching or anxiety 180 tablet 0    ipratropium (ATROVENT) 0.06 % nasal spray 2 sprays into each nostril 2 (two) times a day 15 mL 0    levocetirizine (XYZAL) 5 MG tablet Take 1 tablet (5 mg total) by mouth every evening 90 tablet 3    metoprolol tartrate (LOPRESSOR) 50 mg tablet Take 1 tablet (50 mg total) by mouth every 12 (twelve) hours 180 tablet 0    montelukast (SINGULAIR) 10 mg tablet take 1 tablet by mouth once daily 100 tablet 1     Current Facility-Administered Medications on File Prior to Visit   Medication Dose Route Frequency Provider Last Rate Last Admin    cyanocobalamin injection 1,000 mcg  1,000 mcg Intramuscular Q30 Days Stacey Mckeon DO   1,000 mcg at 11/27/23 1002      Social History     Tobacco Use    Smoking status: Every Day     Current packs/day: 2.00     Average packs/day: 2.0 packs/day for 40.0 years (80.0 ttl pk-yrs)     Types: Cigarettes    Smokeless tobacco: Never   Vaping Use    Vaping status: Never Used   Substance and Sexual Activity    Alcohol use: Not Currently     Comment: 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   Constitutional:       General: She is not in acute " distress.     Appearance: Normal appearance. She is normal weight. She is not ill-appearing or toxic-appearing.   HENT:      Head: Normocephalic and atraumatic.      Nose: Nose normal.   Eyes:      General: No scleral icterus.     Conjunctiva/sclera: Conjunctivae normal.   Cardiovascular:      Rate and Rhythm: Normal rate.   Pulmonary:      Effort: Pulmonary effort is normal.      Comments: On room air   Abdominal:      General: There is distension.      Palpations: Abdomen is soft.      Tenderness: There is abdominal tenderness (RUQ). There is no guarding or rebound.      Hernia: A hernia (umbilical) is present.   Musculoskeletal:      Right lower leg: No edema.      Left lower leg: No edema.   Skin:     General: Skin is warm.      Capillary Refill: Capillary refill takes less than 2 seconds.   Neurological:      Mental Status: She is alert and oriented to person, place, and time.   Psychiatric:         Mood and Affect: Mood normal.         Behavior: Behavior normal.         Thought Content: Thought content normal.

## 2024-12-06 ENCOUNTER — RESULTS FOLLOW-UP (OUTPATIENT)
Dept: OTHER | Facility: HOSPITAL | Age: 59
End: 2024-12-06

## 2024-12-09 ENCOUNTER — ANESTHESIA (OUTPATIENT)
Dept: PERIOP | Facility: HOSPITAL | Age: 59
End: 2024-12-09
Payer: COMMERCIAL

## 2024-12-09 ENCOUNTER — HOSPITAL ENCOUNTER (OUTPATIENT)
Facility: HOSPITAL | Age: 59
Setting detail: OUTPATIENT SURGERY
Discharge: HOME | End: 2024-12-09
Attending: SURGERY | Admitting: SURGERY
Payer: COMMERCIAL

## 2024-12-09 ENCOUNTER — ANESTHESIA EVENT (OUTPATIENT)
Dept: PERIOP | Facility: HOSPITAL | Age: 59
End: 2024-12-09
Payer: COMMERCIAL

## 2024-12-09 VITALS
BODY MASS INDEX: 35.97 KG/M2 | TEMPERATURE: 97 F | OXYGEN SATURATION: 94 % | SYSTOLIC BLOOD PRESSURE: 134 MMHG | DIASTOLIC BLOOD PRESSURE: 73 MMHG | WEIGHT: 203 LBS | HEIGHT: 63 IN | HEART RATE: 80 BPM | RESPIRATION RATE: 18 BRPM

## 2024-12-09 DIAGNOSIS — K82.8 BILIARY DYSKINESIA: Primary | ICD-10-CM

## 2024-12-09 DIAGNOSIS — K42.9 UMBILICAL HERNIA WITHOUT OBSTRUCTION AND WITHOUT GANGRENE: ICD-10-CM

## 2024-12-09 PROCEDURE — 88304 TISSUE EXAM BY PATHOLOGIST: CPT | Performed by: PATHOLOGY

## 2024-12-09 PROCEDURE — NC001 PR NO CHARGE

## 2024-12-09 PROCEDURE — 47562 LAPAROSCOPIC CHOLECYSTECTOMY: CPT | Performed by: SURGERY

## 2024-12-09 RX ORDER — LIDOCAINE HYDROCHLORIDE 10 MG/ML
INJECTION, SOLUTION EPIDURAL; INFILTRATION; INTRACAUDAL; PERINEURAL AS NEEDED
Status: DISCONTINUED | OUTPATIENT
Start: 2024-12-09 | End: 2024-12-09

## 2024-12-09 RX ORDER — PHENYLEPHRINE HCL IN 0.9% NACL 1 MG/10 ML
SYRINGE (ML) INTRAVENOUS AS NEEDED
Status: DISCONTINUED | OUTPATIENT
Start: 2024-12-09 | End: 2024-12-09

## 2024-12-09 RX ORDER — OXYCODONE AND ACETAMINOPHEN 5; 325 MG/1; MG/1
2 TABLET ORAL EVERY 4 HOURS PRN
Refills: 0 | Status: DISCONTINUED | OUTPATIENT
Start: 2024-12-09 | End: 2024-12-09 | Stop reason: HOSPADM

## 2024-12-09 RX ORDER — DEXAMETHASONE SODIUM PHOSPHATE 10 MG/ML
INJECTION, SOLUTION INTRAMUSCULAR; INTRAVENOUS AS NEEDED
Status: DISCONTINUED | OUTPATIENT
Start: 2024-12-09 | End: 2024-12-09

## 2024-12-09 RX ORDER — EPHEDRINE SULFATE 50 MG/ML
INJECTION INTRAVENOUS AS NEEDED
Status: DISCONTINUED | OUTPATIENT
Start: 2024-12-09 | End: 2024-12-09

## 2024-12-09 RX ORDER — FENTANYL CITRATE 50 UG/ML
INJECTION, SOLUTION INTRAMUSCULAR; INTRAVENOUS AS NEEDED
Status: DISCONTINUED | OUTPATIENT
Start: 2024-12-09 | End: 2024-12-09

## 2024-12-09 RX ORDER — GLYCOPYRROLATE 0.2 MG/ML
INJECTION INTRAMUSCULAR; INTRAVENOUS AS NEEDED
Status: DISCONTINUED | OUTPATIENT
Start: 2024-12-09 | End: 2024-12-09

## 2024-12-09 RX ORDER — BUPIVACAINE HYDROCHLORIDE 5 MG/ML
INJECTION, SOLUTION EPIDURAL; INTRACAUDAL AS NEEDED
Status: DISCONTINUED | OUTPATIENT
Start: 2024-12-09 | End: 2024-12-09 | Stop reason: HOSPADM

## 2024-12-09 RX ORDER — ONDANSETRON 2 MG/ML
4 INJECTION INTRAMUSCULAR; INTRAVENOUS ONCE AS NEEDED
Status: DISCONTINUED | OUTPATIENT
Start: 2024-12-09 | End: 2024-12-09 | Stop reason: HOSPADM

## 2024-12-09 RX ORDER — OXYCODONE AND ACETAMINOPHEN 5; 325 MG/1; MG/1
1 TABLET ORAL EVERY 4 HOURS PRN
Qty: 20 TABLET | Refills: 0 | Status: SHIPPED | OUTPATIENT
Start: 2024-12-09

## 2024-12-09 RX ORDER — HEPARIN SODIUM 5000 [USP'U]/ML
5000 INJECTION, SOLUTION INTRAVENOUS; SUBCUTANEOUS ONCE
Status: COMPLETED | OUTPATIENT
Start: 2024-12-09 | End: 2024-12-09

## 2024-12-09 RX ORDER — MAGNESIUM HYDROXIDE 1200 MG/15ML
LIQUID ORAL AS NEEDED
Status: DISCONTINUED | OUTPATIENT
Start: 2024-12-09 | End: 2024-12-09 | Stop reason: HOSPADM

## 2024-12-09 RX ORDER — ROCURONIUM BROMIDE 10 MG/ML
INJECTION, SOLUTION INTRAVENOUS AS NEEDED
Status: DISCONTINUED | OUTPATIENT
Start: 2024-12-09 | End: 2024-12-09

## 2024-12-09 RX ORDER — ONDANSETRON 2 MG/ML
INJECTION INTRAMUSCULAR; INTRAVENOUS AS NEEDED
Status: DISCONTINUED | OUTPATIENT
Start: 2024-12-09 | End: 2024-12-09

## 2024-12-09 RX ORDER — SODIUM CHLORIDE, SODIUM LACTATE, POTASSIUM CHLORIDE, CALCIUM CHLORIDE 600; 310; 30; 20 MG/100ML; MG/100ML; MG/100ML; MG/100ML
125 INJECTION, SOLUTION INTRAVENOUS CONTINUOUS
Status: DISCONTINUED | OUTPATIENT
Start: 2024-12-09 | End: 2024-12-09 | Stop reason: HOSPADM

## 2024-12-09 RX ORDER — KETOROLAC TROMETHAMINE 30 MG/ML
INJECTION, SOLUTION INTRAMUSCULAR; INTRAVENOUS AS NEEDED
Status: DISCONTINUED | OUTPATIENT
Start: 2024-12-09 | End: 2024-12-09

## 2024-12-09 RX ORDER — FENTANYL CITRATE/PF 50 MCG/ML
25 SYRINGE (ML) INJECTION
Status: DISCONTINUED | OUTPATIENT
Start: 2024-12-09 | End: 2024-12-09 | Stop reason: HOSPADM

## 2024-12-09 RX ORDER — PROPOFOL 10 MG/ML
INJECTION, EMULSION INTRAVENOUS AS NEEDED
Status: DISCONTINUED | OUTPATIENT
Start: 2024-12-09 | End: 2024-12-09

## 2024-12-09 RX ORDER — CEFAZOLIN SODIUM 2 G/50ML
2000 SOLUTION INTRAVENOUS ONCE
Status: COMPLETED | OUTPATIENT
Start: 2024-12-09 | End: 2024-12-09

## 2024-12-09 RX ORDER — ONDANSETRON 2 MG/ML
4 INJECTION INTRAMUSCULAR; INTRAVENOUS EVERY 8 HOURS PRN
Status: DISCONTINUED | OUTPATIENT
Start: 2024-12-09 | End: 2024-12-09 | Stop reason: HOSPADM

## 2024-12-09 RX ADMIN — KETOROLAC TROMETHAMINE 30 MG: 30 INJECTION, SOLUTION INTRAMUSCULAR at 09:09

## 2024-12-09 RX ADMIN — LIDOCAINE HYDROCHLORIDE 50 MG: 10 INJECTION, SOLUTION EPIDURAL; INFILTRATION; INTRACAUDAL; PERINEURAL at 08:08

## 2024-12-09 RX ADMIN — ROCURONIUM BROMIDE 50 MG: 10 INJECTION, SOLUTION INTRAVENOUS at 08:08

## 2024-12-09 RX ADMIN — Medication 200 MCG: at 08:47

## 2024-12-09 RX ADMIN — ONDANSETRON 4 MG: 2 INJECTION INTRAMUSCULAR; INTRAVENOUS at 09:09

## 2024-12-09 RX ADMIN — DEXAMETHASONE SODIUM PHOSPHATE 10 MG: 10 INJECTION, SOLUTION INTRAMUSCULAR; INTRAVENOUS at 08:08

## 2024-12-09 RX ADMIN — FENTANYL CITRATE 25 MCG: 50 INJECTION INTRAMUSCULAR; INTRAVENOUS at 09:51

## 2024-12-09 RX ADMIN — GLYCOPYRROLATE 0.2 MG: 0.2 INJECTION, SOLUTION INTRAMUSCULAR; INTRAVENOUS at 08:03

## 2024-12-09 RX ADMIN — PROPOFOL 150 MG: 10 INJECTION, EMULSION INTRAVENOUS at 08:08

## 2024-12-09 RX ADMIN — HEPARIN SODIUM 5000 UNITS: 5000 INJECTION, SOLUTION INTRAVENOUS; SUBCUTANEOUS at 07:26

## 2024-12-09 RX ADMIN — SUGAMMADEX 180 MG: 100 INJECTION, SOLUTION INTRAVENOUS at 09:26

## 2024-12-09 RX ADMIN — DEXMEDETOMIDINE HYDROCHLORIDE 10 MCG: 100 INJECTION, SOLUTION, CONCENTRATE INTRAVENOUS at 08:36

## 2024-12-09 RX ADMIN — SODIUM CHLORIDE, SODIUM LACTATE, POTASSIUM CHLORIDE, AND CALCIUM CHLORIDE: .6; .31; .03; .02 INJECTION, SOLUTION INTRAVENOUS at 08:52

## 2024-12-09 RX ADMIN — FENTANYL CITRATE 50 MCG: 50 INJECTION INTRAMUSCULAR; INTRAVENOUS at 08:06

## 2024-12-09 RX ADMIN — CEFAZOLIN SODIUM 2000 MG: 2 SOLUTION INTRAVENOUS at 08:03

## 2024-12-09 RX ADMIN — FENTANYL CITRATE 50 MCG: 50 INJECTION INTRAMUSCULAR; INTRAVENOUS at 08:33

## 2024-12-09 RX ADMIN — OXYCODONE HYDROCHLORIDE AND ACETAMINOPHEN 2 TABLET: 5; 325 TABLET ORAL at 10:28

## 2024-12-09 RX ADMIN — PHENYLEPHRINE HYDROCHLORIDE 25 MCG/MIN: 10 INJECTION INTRAVENOUS at 08:33

## 2024-12-09 RX ADMIN — FENTANYL CITRATE 25 MCG: 50 INJECTION INTRAMUSCULAR; INTRAVENOUS at 10:01

## 2024-12-09 RX ADMIN — EPHEDRINE SULFATE 5 MG: 50 INJECTION, SOLUTION INTRAVENOUS at 08:47

## 2024-12-09 RX ADMIN — SODIUM CHLORIDE, SODIUM LACTATE, POTASSIUM CHLORIDE, AND CALCIUM CHLORIDE 125 ML/HR: .6; .31; .03; .02 INJECTION, SOLUTION INTRAVENOUS at 07:24

## 2024-12-09 RX ADMIN — ROCURONIUM BROMIDE 10 MG: 10 INJECTION, SOLUTION INTRAVENOUS at 08:35

## 2024-12-09 RX ADMIN — DEXMEDETOMIDINE HYDROCHLORIDE 10 MCG: 100 INJECTION, SOLUTION, CONCENTRATE INTRAVENOUS at 08:03

## 2024-12-09 NOTE — OP NOTE
OPERATIVE REPORT  PATIENT NAME: Michelle Trevino    :  1965  MRN: [id number]  Pt Location: [facility] OR ROOM 02    SURGERY DATE: 2024    Surgeons and Role:     * Saravanan Ramos DO - Primary     * Vincenzo Fischer PA-C - Assisting     * Douglas Beck MD - Assisting    Preop Diagnosis:  Biliary dyskinesia [K82.8]    Post-Op Diagnosis Codes:     * Biliary dyskinesia [K82.8]    Procedure(s):  CHOLECYSTECTOMY LAPAROSCOPIC  REPAIR HERNIA UMBILICAL    Specimen(s):  ID Type Source Tests Collected by Time Destination   1 : GALLBLADDER AND CONTENTS Tissue Gallbladder TISSUE EXAM Saravanan Ramos DO 2024  9:17 AM        Estimated Blood Loss:   Minimal    Drains:  * No LDAs found *    Anesthesia Type:   General    Operative Indications:  Biliary dyskinesia [K82.8]  Umbilical hernia    Operative Findings:  -Chronically inflamed distended gallbladder  -0.5 cm infraumbilical fascial defect    Complications:   None    Procedure and Technique:  Patient was seen and identified in the preoperative holding area.  Patient was transferred to the operating room and again identified both verbally and by wristband.  Patient was transported to the operating room table and positioned supine.  All monitoring devices were attached.  Patient was given DVT prophylaxis and SCD months were attached.  General anesthesia was induced.  Patient was prepped with chlorhexidine and allowed to dry.  Patient was draped in sterile fashion.  A timeout was called and all parties were in agreement to proceed with laparoscopic cholecystectomy and umbilical hernia repair.  An infraumbilical curvilinear incision was made with a 15 blade scalpel.  This incision was carried through subcutaneous tissue with electrocautery.  A Jo Ann clamp the umbilical stalk was encircled and transected with electrocautery.  Using S retractors subcutaneous tissue was divided to the fascia.  Prior to opening the fascia the umbilical hernia defect was palpated and measured to be  0.5 cm.  The fascial defect was enlarged with electrocautery.  Using a finger, the peritoneum was cleared of surrounding adhesions.  Under direct visualization a Ng trocar was placed into the infraumbilical incision.  The abdomen was insufflated to 12 to 14 mmHg.  Next under direct visualization a subxiphoid 5 mm port was placed.  Next to right upper quadrant ports were placed under direct visualization.  The abdomen was inspected and there appeared to be no injuries from trocar insertion.  The gallbladder was grasped and retracted cephalad and the fat and peritoneum overlying the infundibulum of the gallbladder was scored.  This opening was carried laterally and medially.  Excess fat was removed from the infundibulum.  The cystic duct and cystic artery were dissected out using a [place].  Once a critical view of safety was achieved, 3 clips were placed on the cystic duct.  The cystic duct was transected with laparoscopic scissors between the second and third clip distally.  Next, attention was turned to the cystic artery.  This was skeletonized and 3 clips were placed around the cystic artery.  The cystic artery was transected with laparoscopic scissors between the second and third clip distally.  Next, additional attachments between the gallbladder and cystic plate were taken down.  At this time the gallbladder was taken off of the cystic plate.  The gallbladder was placed into an Endo Catch bag.  The gallbladder fossa was inspected and minimal oozing from the liver was controlled with electrocautery.  The surgical field appeared hemostatic and under direct visualization all ports were removed from the abdomen and insufflation was let out.  The umbilical hernia defect was closed with 1-0 PDS suture in 2 figure-of-eight's.  The infraumbilical incision was irrigated.  The umbilicus was reapproximated with 2-0 Vicryl suture.  Next the infraumbilical subcutaneous tissue was closed with 3-0 Vicryl suture.  All 4  port sites were closed with 4-0 Monocryl suture.  Steri-Strip, gauze and Tegaderm dressings were placed over all port sites.  Patient was awoken from anesthesia and transported to the postanesthesia care unit in good condition.  Dr. Ramos was present for the entire operation    Patient Disposition:  PACU              SIGNATURE: Douglas Beck MD  DATE: December 9, 2024  TIME: 9:46 AM

## 2024-12-09 NOTE — ANESTHESIA POSTPROCEDURE EVALUATION
Post-Op Assessment Note    CV Status:  Stable    Pain management: adequate       Mental Status:  Alert and awake   Hydration Status:  Euvolemic   PONV Controlled:  Controlled   Airway Patency:  Patent     Post Op Vitals Reviewed: Yes    No anethesia notable event occurred.    Staff: CRNA           Last Filed PACU Vitals:  Vitals Value Taken Time   Temp 97.8    Pulse 80 12/09/24 0940   /77 12/09/24 0939   Resp 12 12/09/24 0940   SpO2 98 % 12/09/24 0940   Vitals shown include unfiled device data.    Modified Mesfin:  No data recorded

## 2024-12-09 NOTE — ANESTHESIA PREPROCEDURE EVALUATION
Procedure:  CHOLECYSTECTOMY LAPAROSCOPIC, possible open (Abdomen)  REPAIR HERNIA UMBILICAL (Abdomen)    Relevant Problems   CARDIO   (+) Hyperlipidemia   (+) Hypertension      GI/HEPATIC   (+) Biliary dyskinesia   (+) Esophageal reflux      MUSCULOSKELETAL   (+) Biliary dyskinesia   (+) Diastasis recti      NEURO/PSYCH   (+) Anxiety      PULMONARY   (+) Chronic obstructive airway disease (HCC)        Physical Exam    Airway    Mallampati score: II  TM Distance: >3 FB  Neck ROM: full     Dental       Cardiovascular      Pulmonary      Other Findings  post-pubertal.      Anesthesia Plan  ASA Score- 3     Anesthesia Type- general with ASA Monitors.         Additional Monitors:     Airway Plan: ETT.           Plan Factors-Exercise tolerance (METS): >4 METS.    Chart reviewed.        Patient is a current smoker.  Patient smoked on day of surgery.    Obstructive sleep apnea risk education given perioperatively.        Induction- intravenous.    Postoperative Plan- Plan for postoperative opioid use. Planned trial extubation    Perioperative Resuscitation Plan - Level 1 - Full Code.       Informed Consent- Anesthetic plan and risks discussed with patient.  I personally reviewed this patient with the CRNA. Discussed and agreed on the Anesthesia Plan with the CRNA..    Anesthesia plan and consent discussed with Michelle who expressed understanding and agreement. Risks/benefits and alternatives discussed with patient including possible PONV, sore throat, damage to teeth/lips/gums and possibility of rare anesthetic and surgical emergencies.

## 2024-12-09 NOTE — INTERVAL H&P NOTE
H&P reviewed. After examining the patient I find no changes in the patients condition since the H&P had been written.    Vitals:    12/09/24 0714   BP: 145/67   Pulse: 81   Resp: 18   Temp: (!) 97 °F (36.1 °C)   SpO2: 95%

## 2024-12-09 NOTE — ANESTHESIA POSTPROCEDURE EVALUATION
Post-Op Assessment Note    CV Status:  Stable    Pain management: adequate       Mental Status:  Alert and awake   Hydration Status:  Euvolemic   PONV Controlled:  Controlled   Airway Patency:  Patent     Post Op Vitals Reviewed: Yes    No anethesia notable event occurred.    Staff: CRNA       Post-Op Assessment Note    Last Filed PACU Vitals:  Vitals Value Taken Time   Temp 97.9 °F (36.6 °C) 12/09/24 1001   Pulse 71 12/09/24 1012   /66 12/09/24 1009   Resp 13 12/09/24 1012   SpO2 89 % 12/09/24 1012   Vitals shown include unfiled device data.    Modified Msefin:  Activity: 2 (12/9/2024 10:10 AM)  Respiration: 2 (12/9/2024 10:10 AM)  Circulation: 2 (12/9/2024 10:10 AM)  Consciousness: 2 (12/9/2024 10:10 AM)  Oxygen Saturation: 2 (12/9/2024 10:10 AM)  Modified Mesfin Score: 10 (12/9/2024 10:10 AM)

## 2024-12-09 NOTE — DISCHARGE INSTR - AVS FIRST PAGE
Follow-up with Dr. Ramos in 2 weeks as per appointment.    Regular diet as tolerated.    Low intense activity as tolerated, no heavy lifting, nothing greater than 10 pounds for 6 weeks.    Dressing may be removed on Wednesday.  You may shower on Wednesday.  No baths or swimming.    I would recommend wearing the abdominal binder during the day for at least the next 2 weeks.    If develop fever, nausea vomiting, worsening pain, redness or drainage from incision then call the office or go to the ER.

## 2024-12-11 ENCOUNTER — TELEPHONE (OUTPATIENT)
Age: 59
End: 2024-12-11

## 2024-12-11 ENCOUNTER — RESULTS FOLLOW-UP (OUTPATIENT)
Dept: SURGERY | Facility: CLINIC | Age: 59
End: 2024-12-11

## 2024-12-11 PROCEDURE — 88304 TISSUE EXAM BY PATHOLOGIST: CPT | Performed by: PATHOLOGY

## 2024-12-11 NOTE — TELEPHONE ENCOUNTER
Pt had surgery 2 days ago with Dr Ramos and woke up today and her foot is swelling up.  I transferred her to Becki in triage to help her

## 2024-12-30 DIAGNOSIS — F41.9 ANXIETY: ICD-10-CM

## 2024-12-30 RX ORDER — ALPRAZOLAM 0.5 MG
0.5 TABLET ORAL 2 TIMES DAILY PRN
Qty: 60 TABLET | Refills: 0 | Status: SHIPPED | OUTPATIENT
Start: 2024-12-30

## 2024-12-30 NOTE — TELEPHONE ENCOUNTER
Reason for call:   [x] Refill   [] Prior Auth  [] Other:     Office:   [x] PCP/Provider - Dr Mckeon  [] Specialty/Provider -     Medication: alprazolam     Dose/Frequency: 0.5 mg take 2 times daily as needed    Quantity: 60    Pharmacy: [facility] [address]    Does the patient have enough for 3 days?   [] Yes   [x] No - Send as HP to POD- will run out tomorrow

## 2025-01-06 ENCOUNTER — APPOINTMENT (OUTPATIENT)
Dept: LAB | Facility: HOSPITAL | Age: 60
End: 2025-01-06
Attending: SURGERY
Payer: COMMERCIAL

## 2025-01-06 ENCOUNTER — OFFICE VISIT (OUTPATIENT)
Dept: SURGERY | Facility: CLINIC | Age: 60
End: 2025-01-06
Payer: COMMERCIAL

## 2025-01-06 ENCOUNTER — TELEPHONE (OUTPATIENT)
Dept: SURGERY | Facility: CLINIC | Age: 60
End: 2025-01-06

## 2025-01-06 VITALS
HEIGHT: 63 IN | OXYGEN SATURATION: 97 % | DIASTOLIC BLOOD PRESSURE: 76 MMHG | SYSTOLIC BLOOD PRESSURE: 128 MMHG | BODY MASS INDEX: 36.14 KG/M2 | TEMPERATURE: 98 F | WEIGHT: 204 LBS | HEART RATE: 86 BPM

## 2025-01-06 DIAGNOSIS — L91.8 SKIN TAG: ICD-10-CM

## 2025-01-06 DIAGNOSIS — L91.8 SKIN TAG: Primary | ICD-10-CM

## 2025-01-06 DIAGNOSIS — M79.89 LEFT LEG SWELLING: ICD-10-CM

## 2025-01-06 DIAGNOSIS — Z90.49 S/P LAPAROSCOPIC CHOLECYSTECTOMY: ICD-10-CM

## 2025-01-06 PROCEDURE — 99024 POSTOP FOLLOW-UP VISIT: CPT | Performed by: SURGERY

## 2025-01-06 PROCEDURE — 88305 TISSUE EXAM BY PATHOLOGIST: CPT | Performed by: PATHOLOGY

## 2025-01-06 PROCEDURE — 11200 RMVL SKIN TAGS UP TO&INC 15: CPT | Performed by: SURGERY

## 2025-01-06 NOTE — PROGRESS NOTES
"Skin tag removal    Date/Time: 1/6/2025 2:30 PM    Performed by: Mil Mike MD  Authorized by: Mil Mike MD  Universal Protocol:  Procedure performed by: (Dr. Ramos)  Consent: Verbal consent obtained.  Time out: Immediately prior to procedure a \"time out\" was called to verify the correct patient, procedure, equipment, support staff and site/side marked as required.  Timeout called at: 1/6/2025 3:48 PM.  Patient identity confirmed: verbally with patient      Procedure Details - Skin Tag Destruction:     Up to 15      Total (if more than 15):  1    Body area:  Anogenital    Anogenital location:  Perineal    Initial size (mm):  3    Final defect size (mm):  3    Malignancy: malignancy unknown    Lesion 6:      no concerns-sharply excised using 15 blade, regularly area was prepped in the usual sterile fashion and approximately 3 cc of Xylocaine were administered subcutaneously/dermally for local anesthesia.  Hemostasis was achieved with direct pressure and silver nitrate.  Site was dressed with bacitracin and Band-Aid application.  Patient tolerated the procedure well without any complications.      "

## 2025-01-06 NOTE — PROGRESS NOTES
"Name: Michelle Trevino      : 1965      MRN: [id number]  Encounter Provider: Saravanan Ramos DO  Encounter Date: 2025   Encounter department: [facility]  :  Assessment & Plan  Skin tag  59-year-old female with left gluteal skin tag.  Excision performed in the office.  See separate note for procedure details.  Will follow-up with patient regarding pathology.  Orders:    Tissue Exam; Future    S/P laparoscopic cholecystectomy  59-year-old female s/p laparoscopic cholecystectomy on  for biliary dyskinesia.  Patient postoperatively is doing well she does note ongoing diarrhea.  Patient admits consuming high fat diet with significant and we discussed how this may be contributing in the postoperative period to her ongoing diarrhea.  Currently recommending a low-fat diet with reduced intake of processed foods.  Also discussed a fiber supplement.  Patient educated that if this were to persist to please notify the office for possible medications to assist with this or follow-up with PCP for further workup.  Patient notes that approximately 1 week prior she was \"stretching\" in the middle the night and noticed a sharp right upper quadrant pain and a noticeable bulge accompanied with a feeling like her \"pelvic floor\" was dropping out of her.  The patient applied an abdominal binder over the last week and noted symptomatic improvement from this.  She is no longer tender or has any discomfort to the right upper quadrant.  None of her port sites are significantly tender and are without hernia or defect.  This may have been a subacute hematoma which may have developed and subsequently resided.  Patient does note that approximately 3 days after her surgery she noted a unilateral left lower extremity swelling at the distal aspect of her foot.  Patient does note a family history of DVT.  Patient is nontender on today's exam without any overt swelling.  Bilateral extremities appear uniform.  Given " "recent C2 surgery and family history will obtain venous duplex to rule out acute versus subacute DVT of the left lower extremity.  Patient is otherwise doing well without any additional overt complaints.       -no further activity restrictions  -Recommend low-fat diet  -OTC dietary fiber supplement  -Monitor diarrhea output frequency  -May follow-up as needed    Left leg swelling  Reportedly occurring 2-3 after surgery.    -Obtain venous duplex study to rule out for acute versus subacute DVT.  Orders:     VAS VENOUS DUPLEX - LOWER LIMB BILATERAL; Future        History of Present Illness   SERGIO Trevino is a 59 y.o. female who presents for postop check.  Patient denies any fevers chills night sweats, no chest pain or shortness of breath.  Denies any abdominal discomfort but has endorsed daily diarrhea.  Patient admits to having high fat high processed food intake.  We reviewed and discussed dietary changes as well as fiber supplementation.  Patient was amenable to this.  Patient denies any urinary changes.  Patient otherwise feels well from a surgical perspective.        Review of Systems       Objective   /76 (BP Location: Left arm, Patient Position: Sitting, Cuff Size: Standard)   Pulse 86   Temp 98 °F (36.7 °C) (Temporal)   Ht 5' 3\" (1.6 m)   Wt 92.5 kg (204 lb)   SpO2 97%   BMI 36.14 kg/m²      Physical Exam  Constitutional:       General: She is not in acute distress.     Appearance: Normal appearance. She is obese. She is not toxic-appearing.   HENT:      Head: Normocephalic.      Mouth/Throat:      Mouth: Mucous membranes are moist.   Eyes:      Extraocular Movements: Extraocular movements intact.      Pupils: Pupils are equal, round, and reactive to light.   Cardiovascular:      Rate and Rhythm: Normal rate.   Pulmonary:      Effort: Pulmonary effort is normal.   Abdominal:      General: There is no distension.      Palpations: Abdomen is soft. There is no mass.      Tenderness: There is no " abdominal tenderness. There is no guarding or rebound.      Comments: No overt masses or hematoma noted, incision sites are well-healing no dehiscence or fascial defects observed.   Skin:     General: Skin is warm.   Neurological:      General: No focal deficit present.      Mental Status: She is alert and oriented to person, place, and time.

## 2025-01-06 NOTE — TELEPHONE ENCOUNTER
JOHN to inform patient the Venous Duplex ordered is scheduled at [facility] 1/7/2025 at 12pm.  I gave address in my message.

## 2025-01-07 ENCOUNTER — HOSPITAL ENCOUNTER (OUTPATIENT)
Dept: NON INVASIVE DIAGNOSTICS | Facility: HOSPITAL | Age: 60
Discharge: HOME | End: 2025-01-07
Payer: COMMERCIAL

## 2025-01-07 DIAGNOSIS — M79.89 LEFT LEG SWELLING: ICD-10-CM

## 2025-01-07 PROCEDURE — 93970 EXTREMITY STUDY: CPT | Performed by: SURGERY

## 2025-01-07 PROCEDURE — 93970 EXTREMITY STUDY: CPT

## 2025-01-09 PROCEDURE — 88305 TISSUE EXAM BY PATHOLOGIST: CPT | Performed by: PATHOLOGY

## 2025-01-12 ENCOUNTER — RESULTS FOLLOW-UP (OUTPATIENT)
Dept: OTHER | Facility: HOSPITAL | Age: 60
End: 2025-01-12

## 2025-01-22 DIAGNOSIS — J32.9 RECURRENT SINUSITIS: ICD-10-CM

## 2025-01-22 DIAGNOSIS — R05.9 COUGH: ICD-10-CM

## 2025-01-22 RX ORDER — MONTELUKAST SODIUM 10 MG/1
10 TABLET ORAL DAILY
Qty: 100 TABLET | Refills: 3 | Status: SHIPPED | OUTPATIENT
Start: 2025-01-22

## 2025-01-28 DIAGNOSIS — F41.9 ANXIETY: ICD-10-CM

## 2025-01-28 RX ORDER — ALPRAZOLAM 0.5 MG
0.5 TABLET ORAL 2 TIMES DAILY PRN
Qty: 60 TABLET | Refills: 0 | Status: SHIPPED | OUTPATIENT
Start: 2025-01-28

## 2025-02-03 ENCOUNTER — OFFICE VISIT (OUTPATIENT)
Dept: FAMILY MEDICINE CLINIC | Facility: CLINIC | Age: 60
End: 2025-02-03
Payer: COMMERCIAL

## 2025-02-03 VITALS
OXYGEN SATURATION: 97 % | HEART RATE: 80 BPM | SYSTOLIC BLOOD PRESSURE: 130 MMHG | WEIGHT: 203 LBS | TEMPERATURE: 97.4 F | DIASTOLIC BLOOD PRESSURE: 76 MMHG | BODY MASS INDEX: 35.97 KG/M2 | HEIGHT: 63 IN | RESPIRATION RATE: 18 BRPM

## 2025-02-03 DIAGNOSIS — R07.1 CHEST PAIN ON BREATHING: ICD-10-CM

## 2025-02-03 DIAGNOSIS — Z00.00 ANNUAL PHYSICAL EXAM: Primary | ICD-10-CM

## 2025-02-03 DIAGNOSIS — J01.00 ACUTE MAXILLARY SINUSITIS, RECURRENCE NOT SPECIFIED: ICD-10-CM

## 2025-02-03 PROBLEM — Z53.20 COLON CANCER SCREENING DECLINED: Status: RESOLVED | Noted: 2021-12-14 | Resolved: 2025-02-03

## 2025-02-03 PROCEDURE — 99396 PREV VISIT EST AGE 40-64: CPT | Performed by: INTERNAL MEDICINE

## 2025-02-03 RX ORDER — LEVOFLOXACIN 500 MG/1
500 TABLET, FILM COATED ORAL EVERY 24 HOURS
Qty: 7 TABLET | Refills: 0 | Status: SHIPPED | OUTPATIENT
Start: 2025-02-03 | End: 2025-02-10

## 2025-02-03 NOTE — PROGRESS NOTES
Adult Annual Physical  Name: Michelle Trevino      : 1965      MRN: [id number]  Encounter Provider: Stacey Mckeon DO  Encounter Date: 2/3/2025   Encounter department: [facility]    Assessment & Plan  Annual physical exam  Comparison EKG   Gerd diet Smaller meals   Stay hydrated        Acute maxillary sinusitis, recurrence not specified    Orders:    levofloxacin (LEVAQUIN) 500 mg tablet; Take 1 tablet (500 mg total) by mouth every 24 hours for 7 days  Stay hydrated Continue nasal spray  Chest pain on breathing    Orders:    ECG 12 lead; Future  Comparison EKG Not sure sxs are acute some gerd component   Immunizations and preventive care screenings were discussed with patient today. Appropriate education was printed on patient's after visit summary.    Counseling:  Exercise: the importance of regular exercise/physical activity was discussed. Recommend exercise 3-5 times per week for at least 30 minutes.       Depression Screening and Follow-up Plan: Patient was screened for depression during today's encounter. They screened negative with a PHQ-2 score of 0.    Rto 3months     History of Present Illness   Pt has had sinus congestion again for few weeks Has long history of chronic sinus issues She is still smoking Has had some chest congestion? Versus pressure She had galldbladder surgery with Dr Ramos and is doing better Had some lower leg edema It is improving but not resolved - her diet is hi in sodium     Adult Annual Physical  Review of Systems   Constitutional:  Negative for chills and fever.   HENT:  Positive for congestion and postnasal drip.    Eyes:  Negative for visual disturbance.   Respiratory:  Negative for cough.    Cardiovascular:  Negative for chest pain, palpitations and leg swelling.   Gastrointestinal: Negative.    Genitourinary: Negative.    Musculoskeletal:  Positive for arthralgias.   Neurological:  Negative for dizziness, light-headedness and headaches.  "  Psychiatric/Behavioral:  Negative for sleep disturbance. The patient is not nervous/anxious.      Past Medical History:   Diagnosis Date    Age-related cataract of both eyes 10/21/2020    Anxiety     Arthritis     Breast mass     Breathing difficulty     Cancer (HCC)     lung    Chronic serous otitis media, right ear     Colon polyp     COPD (chronic obstructive pulmonary disease) (HCC)     Deviated septum     Hearing aid worn     left    Hyperlipidemia     Hypertension     Hypertrophy of nasal turbinates     Lung nodule     Mixed conductive and sensorineural hearing loss of both ears     Polycythemia     Shortness of breath     HUMPHREY    Stroke (HCC)     \"mini stroke yrs ago\" \"stress related\"     Past Surgical History:   Procedure Laterality Date    BREAST EXCISIONAL BIOPSY Right     benign    BREAST EXCISIONAL BIOPSY Left     benign    CATARACT EXTRACTION Bilateral     CATARACT EXTRACTION, BILATERAL      COLONOSCOPY      HYSTERECTOMY      OK ADENOIDECTOMY PRIMARY AGE 12/> N/A 10/31/2022    Procedure: ADENOIDECTOMY;  Surgeon: Douglas Shah MD;  Location:  MAIN OR;  Service: ENT    OK LAPAROSCOPY SURG CHOLECYSTECTOMY N/A 12/9/2024    Procedure: CHOLECYSTECTOMY LAPAROSCOPIC;  Surgeon: Saravanan Ramos DO;  Location: [facility];  Service: General    OK NASAL/SINUS NDSC SURG W/NICOLE BULLOSA RESECTION N/A 10/31/2022    Procedure: EXCISION OF LEFT NICOLE BULLOSA, BILATERAL MIDDLE MEATUS ANTROSTOMIES, BILATERAL ANTERIOR ETHMOIDECTOMIES WITH FUSION;  Surgeon: Douglas Shah MD;  Location:  MAIN OR;  Service: ENT    OK RPR AA HERNIA 1ST 3-10 CM REDUCIBLE N/A 12/9/2024    Procedure: REPAIR HERNIA UMBILICAL;  Surgeon: Saravanan Ramos DO;  Location: [facility];  Service: General    OK SEPTOPLASTY/SUBMUCOUS RESECJ W/WO CARTILAGE GRF N/A 10/31/2022    Procedure: SEPTOPLASTY, BILATERAL INFERIOR TURBINATE COBLATION;  Surgeon: Douglas Shah MD;  Location:  MAIN OR;  Service: ENT    OK TYMPANOSTOMY GENERAL ANESTHESIA Bilateral " "10/31/2022    Procedure: MYRINGOTOMY WITH TUBES;  Surgeon: Douglas Shah MD;  Location: [facility];  Service: ENT    [place] TYMPANOSTOMY GENERAL ANESTHESIA Bilateral 12/11/2023    Procedure: MYRINGOTOMY WITH TUBES;  Surgeon: Douglas Shah MD;  [facility]: [facility];  Service: ENT    TUBAL LIGATION       Social History     Socioeconomic History    Marital status: /Civil Union     Spouse name: Not on file    Number of children: Not on file    Years of education: Not on file    Highest education level: Not on file   Occupational History    Not on file   Tobacco Use    Smoking status: Every Day     Current packs/day: 2.00     Average packs/day: 2.0 packs/day for 40.0 years (80.0 ttl pk-yrs)     Types: Cigarettes    Smokeless tobacco: Never   Vaping Use    Vaping status: Never Used   Substance and Sexual Activity    Alcohol use: Not Currently     Comment: 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    Drug use: No    Sexual activity: Not Currently     Partners: Male     Birth control/protection: Female Sterilization   Other Topics Concern    Not on file   Social History Narrative    Dental care, occasionally    Lives independently with spouse    No living will    Sun protection - sunscreen    Uses safety equipment - seatbelts     Social Drivers of Health     Financial Resource Strain: Not on file   Food Insecurity: Not on file   Transportation Needs: Not on file   Physical Activity: Not on file   Stress: Not on file   Social Connections: Unknown (6/18/2024)    Received from [employer]    Social [employer]     How often do you feel lonely or isolated from those around you? (Adult - for ages 18 years and over): Not on file   Intimate Partner Violence: Not on file   Housing Stability: Not on file     Allergies   Allergen Reactions    Lorazepam Itching     Pt reports all over the body         Objective   /76   Pulse 80   Temp (!) 97.4 °F (36.3 °C) (Temporal)   Resp 18   Ht 5' 3\" (1.6 m)   Wt 92.1 kg (203 lb)   SpO2 97%   BMI 35.96 " kg/m²     Physical Exam  Vitals and nursing note reviewed.   Constitutional:       General: She is not in acute distress.     Appearance: Normal appearance. She is not ill-appearing, toxic-appearing or diaphoretic.   HENT:      Head: Normocephalic and atraumatic.      Right Ear: External ear normal.      Left Ear: External ear normal.      Nose: Nose normal.      Mouth/Throat:      Mouth: Mucous membranes are moist.      Pharynx: No oropharyngeal exudate or posterior oropharyngeal erythema.   Eyes:      Extraocular Movements: Extraocular movements intact.      Conjunctiva/sclera: Conjunctivae normal.      Pupils: Pupils are equal, round, and reactive to light.   Cardiovascular:      Rate and Rhythm: Normal rate and regular rhythm.      Pulses: Normal pulses.      Heart sounds: Normal heart sounds.   Pulmonary:      Effort: Pulmonary effort is normal.      Breath sounds: Normal breath sounds.   Abdominal:      General: Bowel sounds are normal. There is no distension.      Palpations: Abdomen is soft.      Tenderness: There is no abdominal tenderness.   Musculoskeletal:         General: Normal range of motion.      Cervical back: Normal range of motion and neck supple.      Right lower leg: No edema.      Left lower leg: No edema.   Lymphadenopathy:      Cervical: No cervical adenopathy.   Skin:     General: Skin is warm and dry.      Coloration: Skin is not jaundiced or pale.   Neurological:      General: No focal deficit present.      Mental Status: She is alert and oriented to person, place, and time. Mental status is at baseline.      Cranial Nerves: No cranial nerve deficit.      Sensory: No sensory deficit.   Psychiatric:         Mood and Affect: Mood normal.         Behavior: Behavior normal.

## 2025-02-04 ENCOUNTER — TELEPHONE (OUTPATIENT)
Age: 60
End: 2025-02-04

## 2025-02-04 DIAGNOSIS — N76.1 SUBACUTE VAGINITIS: Primary | ICD-10-CM

## 2025-02-04 RX ORDER — FLUCONAZOLE 150 MG/1
150 TABLET ORAL ONCE
Qty: 1 TABLET | Refills: 0 | Status: SHIPPED | OUTPATIENT
Start: 2025-02-04 | End: 2025-02-04

## 2025-02-04 NOTE — TELEPHONE ENCOUNTER
Patient is asking if she needs additional medication for a yeast infection while taking levofloxacin (LEVAQUIN) 500 mg   She said she always gets a yeast infection while taking antibiotics  Please call patient to advise

## 2025-02-21 DIAGNOSIS — F41.9 ANXIETY: ICD-10-CM

## 2025-02-21 RX ORDER — HYDROXYZINE HYDROCHLORIDE 10 MG/1
10 TABLET, FILM COATED ORAL EVERY 8 HOURS PRN
Qty: 180 TABLET | Refills: 1 | Status: SHIPPED | OUTPATIENT
Start: 2025-02-21

## 2025-02-27 DIAGNOSIS — F41.9 ANXIETY: ICD-10-CM

## 2025-02-27 RX ORDER — ALPRAZOLAM 0.5 MG
0.5 TABLET ORAL 2 TIMES DAILY PRN
Qty: 60 TABLET | Refills: 0 | Status: SHIPPED | OUTPATIENT
Start: 2025-02-27

## 2025-02-28 DIAGNOSIS — I10 ESSENTIAL HYPERTENSION: ICD-10-CM

## 2025-02-28 RX ORDER — CITALOPRAM HYDROBROMIDE 20 MG/1
20 TABLET ORAL DAILY
Qty: 90 TABLET | Refills: 0 | Status: SHIPPED | OUTPATIENT
Start: 2025-02-28

## 2025-03-15 DIAGNOSIS — I10 ESSENTIAL HYPERTENSION: ICD-10-CM

## 2025-03-16 RX ORDER — METOPROLOL TARTRATE 50 MG
50 TABLET ORAL EVERY 12 HOURS
Qty: 180 TABLET | Refills: 1 | Status: SHIPPED | OUTPATIENT
Start: 2025-03-16

## 2025-03-25 DIAGNOSIS — F41.9 ANXIETY: ICD-10-CM

## 2025-03-26 RX ORDER — ALPRAZOLAM 0.5 MG
0.5 TABLET ORAL 2 TIMES DAILY PRN
Qty: 60 TABLET | Refills: 0 | Status: SHIPPED | OUTPATIENT
Start: 2025-03-26

## 2025-04-09 DIAGNOSIS — R10.9 ABDOMINAL PAIN, UNSPECIFIED ABDOMINAL LOCATION: Primary | ICD-10-CM

## 2025-04-09 RX ORDER — CIPROFLOXACIN 500 MG/1
500 TABLET, FILM COATED ORAL EVERY 12 HOURS SCHEDULED
Qty: 14 TABLET | Refills: 0 | Status: SHIPPED | OUTPATIENT
Start: 2025-04-09 | End: 2025-04-16

## 2025-04-10 ENCOUNTER — TELEPHONE (OUTPATIENT)
Dept: FAMILY MEDICINE CLINIC | Facility: CLINIC | Age: 60
End: 2025-04-10

## 2025-04-10 DIAGNOSIS — N76.0 ACUTE VAGINITIS: Primary | ICD-10-CM

## 2025-04-10 RX ORDER — FLUCONAZOLE 150 MG/1
150 TABLET ORAL ONCE
Qty: 1 TABLET | Refills: 0 | Status: SHIPPED | OUTPATIENT
Start: 2025-04-10 | End: 2025-04-10

## 2025-04-10 NOTE — TELEPHONE ENCOUNTER
"Patient has UTI and picked up antibiotics for this but also needed something called in due to getting yeast infections each time she takes antibiotic. Patient thinks she used to get diflucan, \"just one pill\". Please send to [facility] in [place] or contact patient to discuss.   "

## 2025-04-26 DIAGNOSIS — F41.9 ANXIETY: ICD-10-CM

## 2025-04-26 DIAGNOSIS — I10 ESSENTIAL HYPERTENSION: ICD-10-CM

## 2025-04-27 RX ORDER — ALPRAZOLAM 0.5 MG
0.5 TABLET ORAL 2 TIMES DAILY PRN
Qty: 60 TABLET | Refills: 0 | Status: SHIPPED | OUTPATIENT
Start: 2025-04-27

## 2025-04-28 RX ORDER — CITALOPRAM HYDROBROMIDE 20 MG/1
20 TABLET ORAL DAILY
Qty: 90 TABLET | Refills: 0 | Status: SHIPPED | OUTPATIENT
Start: 2025-04-28

## 2025-05-05 ENCOUNTER — OFFICE VISIT (OUTPATIENT)
Dept: FAMILY MEDICINE CLINIC | Facility: CLINIC | Age: 60
End: 2025-05-05
Payer: COMMERCIAL

## 2025-05-05 VITALS
OXYGEN SATURATION: 97 % | HEART RATE: 82 BPM | RESPIRATION RATE: 18 BRPM | WEIGHT: 212 LBS | TEMPERATURE: 97.2 F | DIASTOLIC BLOOD PRESSURE: 68 MMHG | SYSTOLIC BLOOD PRESSURE: 128 MMHG | HEIGHT: 63 IN | BODY MASS INDEX: 37.56 KG/M2

## 2025-05-05 DIAGNOSIS — R19.5 LOOSE STOOLS: ICD-10-CM

## 2025-05-05 DIAGNOSIS — D75.1 SECONDARY POLYCYTHEMIA: ICD-10-CM

## 2025-05-05 DIAGNOSIS — I10 HYPERTENSION, UNSPECIFIED TYPE: Primary | ICD-10-CM

## 2025-05-05 DIAGNOSIS — E78.5 HYPERLIPIDEMIA, UNSPECIFIED HYPERLIPIDEMIA TYPE: ICD-10-CM

## 2025-05-05 DIAGNOSIS — E88.819 INSULIN RESISTANCE: ICD-10-CM

## 2025-05-05 PROCEDURE — 99214 OFFICE O/P EST MOD 30 MIN: CPT | Performed by: INTERNAL MEDICINE

## 2025-05-05 RX ORDER — COLESTIPOL HYDROCHLORIDE 1 G/1
1 TABLET ORAL 2 TIMES DAILY
Qty: 60 TABLET | Refills: 3 | Status: SHIPPED | OUTPATIENT
Start: 2025-05-05

## 2025-05-05 RX ORDER — METFORMIN HYDROCHLORIDE 500 MG/1
500 TABLET, EXTENDED RELEASE ORAL
Qty: 30 TABLET | Refills: 0 | Status: SHIPPED | OUTPATIENT
Start: 2025-05-05

## 2025-05-05 NOTE — ASSESSMENT & PLAN NOTE
Orders:    Comprehensive metabolic panel; Future  Lo sodium diet and exercise Continue current rx  Smoking cessation encouraged

## 2025-05-05 NOTE — PROGRESS NOTES
Name: Michelle Trevino      : 1965      MRN: [id number]  Encounter Provider: Stacey Mckeon DO  Encounter Date: 2025   Encounter department: [facility] [facility]  :  Assessment & Plan  Hypertension, unspecified type    Orders:    Comprehensive metabolic panel; Future  Lo sodium diet and exercise Continue current rx  Smoking cessation encouraged   Hyperlipidemia, unspecified hyperlipidemia type    Orders:    Comprehensive metabolic panel; Future  Lo fat diet Continue home exercises   Secondary polycythemia    Orders:    CBC and Platelet; Future  Recheck/monitor cbc Pt will go Saturday for labs   Insulin resistance    Orders:    metFORMIN (GLUCOPHAGE-XR) 500 mg 24 hr tablet; Take 1 tablet (500 mg total) by mouth daily with breakfast    Insulin, fasting; Future    Loose stools    Orders:    colestipol (COLESTID) 1 g tablet; Take 1 tablet (1 g total) by mouth 2 (two) times a day  Trial colestid for post choley loose stools Increase fiber in diet, stay hydrated Lo fat diet     Rto 3months     Depression Screening and Follow-up Plan: Patient was screened for depression during today's encounter. They screened negative with a PHQ-2 score of 0.        History of Present Illness   HPI  Pt doing ok She is doing home exercises and eating less but not able to lose weight She is busy with home rennovations which she enjoys She is having loose frequent stools since she had choley No abdominal pain Tried to increase fiber without benefit No n/v She is still smoking but no sob or chest pain Sinus sxs stable on current rx   Review of Systems   Constitutional:  Negative for chills and fever.   HENT: Negative.     Eyes:  Negative for visual disturbance.   Respiratory:  Negative for cough and shortness of breath.    Cardiovascular: Negative.    Gastrointestinal: Negative.    Genitourinary: Negative.    Musculoskeletal: Negative.    Neurological:  Negative for dizziness, light-headedness and headaches.  "  Psychiatric/Behavioral:  Negative for sleep disturbance. The patient is not nervous/anxious.      Past Medical History:   Diagnosis Date    Age-related cataract of both eyes 10/21/2020    Anxiety     Arthritis     Breast mass     Breathing difficulty     Cancer (HCC)     lung    Chronic serous otitis media, right ear     Colon polyp     COPD (chronic obstructive pulmonary disease) (HCC)     Deviated septum     Hearing aid worn     left    Hyperlipidemia     Hypertension     Hypertrophy of nasal turbinates     Lung nodule     Mixed conductive and sensorineural hearing loss of both ears     Polycythemia     Shortness of breath     HUMPHREY    Stroke (HCC)     \"mini stroke yrs ago\" \"stress related\"     Past Surgical History:   Procedure Laterality Date    BREAST EXCISIONAL BIOPSY Right     benign    BREAST EXCISIONAL BIOPSY Left     benign    CATARACT EXTRACTION Bilateral     CATARACT EXTRACTION, BILATERAL      COLONOSCOPY      HYSTERECTOMY      UT ADENOIDECTOMY PRIMARY AGE 12/> N/A 10/31/2022    Procedure: ADENOIDECTOMY;  Surgeon: Douglas Shah MD;  Location:  MAIN OR;  Service: ENT    UT LAPAROSCOPY SURG CHOLECYSTECTOMY N/A 12/9/2024    Procedure: CHOLECYSTECTOMY LAPAROSCOPIC;  Surgeon: Saravanan Ramos DO;  Location: [facility];  Service: General    UT NASAL/SINUS NDSC SURG W/NICOLE BULLOSA RESECTION N/A 10/31/2022    Procedure: EXCISION OF LEFT NICOLE BULLOSA, BILATERAL MIDDLE MEATUS ANTROSTOMIES, BILATERAL ANTERIOR ETHMOIDECTOMIES WITH FUSION;  Surgeon: Douglas Shah MD;  Location:  MAIN OR;  Service: ENT    UT RPR AA HERNIA 1ST 3-10 CM REDUCIBLE N/A 12/9/2024    Procedure: REPAIR HERNIA UMBILICAL;  Surgeon: Saravanan Ramos DO;  Location: [facility];  Service: General    UT SEPTOPLASTY/SUBMUCOUS RESECJ W/WO CARTILAGE GRF N/A 10/31/2022    Procedure: SEPTOPLASTY, BILATERAL INFERIOR TURBINATE COBLATION;  Surgeon: Douglas Shah MD;  Location:  MAIN OR;  Service: ENT    UT TYMPANOSTOMY GENERAL ANESTHESIA Bilateral " "10/31/2022    Procedure: MYRINGOTOMY WITH TUBES;  Surgeon: Douglas Shah MD;  [facility]: [facility];  Service: ENT    [place] TYMPANOSTOMY GENERAL ANESTHESIA Bilateral 12/11/2023    Procedure: MYRINGOTOMY WITH TUBES;  Surgeon: Douglas Shah MD;  [facility]: [facility];  Service: ENT    TUBAL LIGATION       Social History     Socioeconomic History    Marital status: /Civil Union     Spouse name: Not on file    Number of children: Not on file    Years of education: Not on file    Highest education level: Not on file   Occupational History    Not on file   Tobacco Use    Smoking status: Every Day     Current packs/day: 2.00     Average packs/day: 2.0 packs/day for 40.0 years (80.0 ttl pk-yrs)     Types: Cigarettes    Smokeless tobacco: Never   Vaping Use    Vaping status: Never Used   Substance and Sexual Activity    Alcohol use: Not Currently     Comment: 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    Drug use: No    Sexual activity: Not Currently     Partners: Male     Birth control/protection: Female Sterilization   Other Topics Concern    Not on file   Social History Narrative    Dental care, occasionally    Lives independently with spouse    No living will    Sun protection - sunscreen    Uses safety equipment - seatbelts     Social Drivers of Health     Financial Resource Strain: Not on file   Food Insecurity: Not on file   Transportation Needs: Not on file   Physical Activity: Not on file   Stress: Not on file   Social Connections: Unknown (6/18/2024)    Received from [employer]    Social [employer]     How often do you feel lonely or isolated from those around you? (Adult - for ages 18 years and over): Not on file   Intimate Partner Violence: Not on file   Housing Stability: Not on file     Allergies   Allergen Reactions    Lorazepam Itching     Pt reports all over the body       Objective   /68   Pulse 82   Temp (!) 97.2 °F (36.2 °C) (Temporal)   Resp 18   Ht 5' 3\" (1.6 m)   Wt 96.2 kg (212 lb)   SpO2 97%   BMI 37.55 kg/m² "      Physical Exam  Vitals and nursing note reviewed.   Constitutional:       General: She is not in acute distress.     Appearance: Normal appearance. She is not ill-appearing, toxic-appearing or diaphoretic.   HENT:      Head: Normocephalic and atraumatic.      Right Ear: External ear normal.      Left Ear: External ear normal.      Nose: Nose normal.      Mouth/Throat:      Mouth: Mucous membranes are moist.   Eyes:      General: No scleral icterus.     Extraocular Movements: Extraocular movements intact.      Conjunctiva/sclera: Conjunctivae normal.      Pupils: Pupils are equal, round, and reactive to light.   Cardiovascular:      Rate and Rhythm: Normal rate and regular rhythm.      Pulses: Normal pulses.      Heart sounds: Normal heart sounds.   Pulmonary:      Effort: Pulmonary effort is normal. No respiratory distress.      Breath sounds: Normal breath sounds. No wheezing.   Abdominal:      General: Bowel sounds are normal. There is no distension.      Palpations: Abdomen is soft.      Tenderness: There is no abdominal tenderness.   Musculoskeletal:      Cervical back: Normal range of motion and neck supple.      Right lower leg: No edema.      Left lower leg: No edema.   Lymphadenopathy:      Cervical: No cervical adenopathy.   Skin:     General: Skin is warm and dry.      Coloration: Skin is not jaundiced or pale.      Findings: No lesion.   Neurological:      General: No focal deficit present.      Mental Status: She is alert and oriented to person, place, and time. Mental status is at baseline.      Cranial Nerves: No cranial nerve deficit.   Psychiatric:         Mood and Affect: Mood normal.         Behavior: Behavior normal.         Thought Content: Thought content normal.         Judgment: Judgment normal.

## 2025-05-22 DIAGNOSIS — F41.9 ANXIETY: ICD-10-CM

## 2025-05-22 RX ORDER — ALPRAZOLAM 0.5 MG
0.5 TABLET ORAL 2 TIMES DAILY PRN
Qty: 60 TABLET | Refills: 0 | Status: SHIPPED | OUTPATIENT
Start: 2025-05-22 | End: 2025-05-25 | Stop reason: SDUPTHER

## 2025-05-24 ENCOUNTER — APPOINTMENT (OUTPATIENT)
Dept: LAB | Facility: MEDICAL CENTER | Age: 60
End: 2025-05-24
Attending: INTERNAL MEDICINE
Payer: COMMERCIAL

## 2025-05-24 DIAGNOSIS — R10.9 ABDOMINAL PAIN, UNSPECIFIED ABDOMINAL LOCATION: ICD-10-CM

## 2025-05-24 DIAGNOSIS — D75.1 SECONDARY POLYCYTHEMIA: ICD-10-CM

## 2025-05-24 DIAGNOSIS — I10 HYPERTENSION, UNSPECIFIED TYPE: ICD-10-CM

## 2025-05-24 DIAGNOSIS — E88.819 INSULIN RESISTANCE: ICD-10-CM

## 2025-05-24 DIAGNOSIS — E78.5 HYPERLIPIDEMIA, UNSPECIFIED HYPERLIPIDEMIA TYPE: ICD-10-CM

## 2025-05-24 LAB
ALBUMIN SERPL BCG-MCNC: 4 G/DL (ref 3.5–5)
ALP SERPL-CCNC: 91 U/L (ref 34–104)
ALT SERPL W P-5'-P-CCNC: 13 U/L (ref 7–52)
ANION GAP SERPL CALCULATED.3IONS-SCNC: 10 MMOL/L (ref 4–13)
AST SERPL W P-5'-P-CCNC: 13 U/L (ref 13–39)
BILIRUB SERPL-MCNC: 0.45 MG/DL (ref 0.2–1)
BUN SERPL-MCNC: 16 MG/DL (ref 5–25)
CALCIUM SERPL-MCNC: 9.1 MG/DL (ref 8.4–10.2)
CHLORIDE SERPL-SCNC: 102 MMOL/L (ref 96–108)
CHOLEST SERPL-MCNC: 158 MG/DL (ref ?–200)
CO2 SERPL-SCNC: 28 MMOL/L (ref 21–32)
CREAT SERPL-MCNC: 1.01 MG/DL (ref 0.6–1.3)
ERYTHROCYTE [DISTWIDTH] IN BLOOD BY AUTOMATED COUNT: 13.1 % (ref 11.6–15.1)
GFR SERPL CREATININE-BSD FRML MDRD: 61 ML/MIN/1.73SQ M
GLUCOSE P FAST SERPL-MCNC: 94 MG/DL (ref 65–99)
HCT VFR BLD AUTO: 49.7 % (ref 34.8–46.1)
HDLC SERPL-MCNC: 44 MG/DL
HGB BLD-MCNC: 15.9 G/DL (ref 11.5–15.4)
INSULIN SERPL-ACNC: 10.08 UIU/ML (ref 1.9–23)
LDLC SERPL CALC-MCNC: 84 MG/DL (ref 0–100)
MCH RBC QN AUTO: 28.8 PG (ref 26.8–34.3)
MCHC RBC AUTO-ENTMCNC: 32 G/DL (ref 31.4–37.4)
MCV RBC AUTO: 90 FL (ref 82–98)
NONHDLC SERPL-MCNC: 114 MG/DL
PLATELET # BLD AUTO: 273 THOUSANDS/UL (ref 149–390)
PMV BLD AUTO: 11.9 FL (ref 8.9–12.7)
POTASSIUM SERPL-SCNC: 4.1 MMOL/L (ref 3.5–5.3)
PROT SERPL-MCNC: 7 G/DL (ref 6.4–8.4)
RBC # BLD AUTO: 5.53 MILLION/UL (ref 3.81–5.12)
SODIUM SERPL-SCNC: 140 MMOL/L (ref 135–147)
TRIGL SERPL-MCNC: 149 MG/DL (ref ?–150)
WBC # BLD AUTO: 10.56 THOUSAND/UL (ref 4.31–10.16)

## 2025-05-24 PROCEDURE — 36415 COLL VENOUS BLD VENIPUNCTURE: CPT

## 2025-05-24 PROCEDURE — 83525 ASSAY OF INSULIN: CPT

## 2025-05-24 PROCEDURE — 80053 COMPREHEN METABOLIC PANEL: CPT

## 2025-05-24 PROCEDURE — 85027 COMPLETE CBC AUTOMATED: CPT

## 2025-05-24 PROCEDURE — 80061 LIPID PANEL: CPT

## 2025-05-25 ENCOUNTER — RESULTS FOLLOW-UP (OUTPATIENT)
Dept: FAMILY MEDICINE CLINIC | Facility: CLINIC | Age: 60
End: 2025-05-25

## 2025-05-25 DIAGNOSIS — F41.9 ANXIETY: ICD-10-CM

## 2025-05-25 RX ORDER — ALPRAZOLAM 0.5 MG
0.5 TABLET ORAL 2 TIMES DAILY PRN
Qty: 60 TABLET | Refills: 0 | Status: SHIPPED | OUTPATIENT
Start: 2025-05-25

## 2025-05-27 ENCOUNTER — APPOINTMENT (OUTPATIENT)
Dept: LAB | Facility: MEDICAL CENTER | Age: 60
End: 2025-05-27
Attending: INTERNAL MEDICINE
Payer: COMMERCIAL

## 2025-05-27 DIAGNOSIS — R10.9 ABDOMINAL PAIN, UNSPECIFIED ABDOMINAL LOCATION: ICD-10-CM

## 2025-05-27 DIAGNOSIS — R10.9 ABDOMINAL PAIN, UNSPECIFIED ABDOMINAL LOCATION: Primary | ICD-10-CM

## 2025-05-27 LAB
BACTERIA UR QL AUTO: ABNORMAL /HPF
BILIRUB UR QL STRIP: NEGATIVE
CAOX CRY URNS QL MICRO: ABNORMAL /HPF
CLARITY UR: CLEAR
COLOR UR: YELLOW
GLUCOSE UR STRIP-MCNC: NEGATIVE MG/DL
HGB UR QL STRIP.AUTO: NEGATIVE
KETONES UR STRIP-MCNC: NEGATIVE MG/DL
LEUKOCYTE ESTERASE UR QL STRIP: ABNORMAL
MUCOUS THREADS UR QL AUTO: ABNORMAL
NITRITE UR QL STRIP: NEGATIVE
NON-SQ EPI CELLS URNS QL MICRO: ABNORMAL /HPF
PH UR STRIP.AUTO: 6 [PH]
PROT UR STRIP-MCNC: ABNORMAL MG/DL
RBC #/AREA URNS AUTO: ABNORMAL /HPF
SP GR UR STRIP.AUTO: 1.03 (ref 1–1.03)
UROBILINOGEN UR STRIP-ACNC: <2 MG/DL
WBC #/AREA URNS AUTO: ABNORMAL /HPF

## 2025-05-27 PROCEDURE — 81001 URINALYSIS AUTO W/SCOPE: CPT

## 2025-05-27 NOTE — TELEPHONE ENCOUNTER
Patient aware of test results. Patient also states the colestipol HCL prescribed for her at last appointment has helped to stop the diarrhea. However, patient still c/o abdominal pain and asking if she can get an Ultrasound?    Please advise

## 2025-05-29 ENCOUNTER — TELEPHONE (OUTPATIENT)
Age: 60
End: 2025-05-29

## 2025-05-29 DIAGNOSIS — E88.819 INSULIN RESISTANCE: ICD-10-CM

## 2025-05-29 DIAGNOSIS — R10.30 LOWER ABDOMINAL PAIN: Primary | ICD-10-CM

## 2025-05-29 DIAGNOSIS — R82.998 CALCIUM OXALATE CRYSTALS IN URINE: ICD-10-CM

## 2025-05-29 NOTE — TELEPHONE ENCOUNTER
Asked questions to patient as per provider message. Patient expressed understanding and had the following response: Patient says she continues to have the abdominal pain in her front and side. She would like the order for the CT to be placed so she can also reschedule her US at the same time.  Please advise.                     Stacey Mckeon DO  5/27/2025  9:43 PM EDT Back to Top      Urine suggests has calcium oxalate deposits which could be sign of kidney stone so would recommend CT for stone protocol if ongoing abdominal pain

## 2025-06-05 ENCOUNTER — TELEPHONE (OUTPATIENT)
Age: 60
End: 2025-06-05

## 2025-06-05 DIAGNOSIS — N81.10 BLADDER PROLAPSE, FEMALE, ACQUIRED: Primary | ICD-10-CM

## 2025-06-05 NOTE — TELEPHONE ENCOUNTER
Patient called stating starting yesterday when she was sitting she felt something bulging out of her vagina.  This occurred 2 times yesterday and this morning. She states no pain, just pressure. Once she stands up it goes away.   Denies any bleeding does have some mucus like discharge. She is unsure if this is something that Dr Mckeon would check or if she would be referred to OBGYN. She has testing tomorrow morning at hospital but is willing to come in tomorrow afternoon if Dr Mckeon feels this is something she would want to evaluate. Can call Michelle at [phone]

## 2025-06-05 NOTE — TELEPHONE ENCOUNTER
Gte testing as scheduled for tomorrow Not sure that will give additional info to symptoms but will call her once results available  It sounds like perhaps bladder prolapse so GYN would be the referral If she does not see someone routinely I can place referral for [facility]

## 2025-06-06 ENCOUNTER — HOSPITAL ENCOUNTER (OUTPATIENT)
Dept: CT IMAGING | Facility: HOSPITAL | Age: 60
Discharge: HOME | End: 2025-06-06
Attending: INTERNAL MEDICINE
Payer: COMMERCIAL

## 2025-06-06 ENCOUNTER — TELEPHONE (OUTPATIENT)
Age: 60
End: 2025-06-06

## 2025-06-06 ENCOUNTER — HOSPITAL ENCOUNTER (OUTPATIENT)
Dept: ULTRASOUND IMAGING | Facility: HOSPITAL | Age: 60
Discharge: HOME | End: 2025-06-06
Attending: INTERNAL MEDICINE
Payer: COMMERCIAL

## 2025-06-06 DIAGNOSIS — R10.9 ABDOMINAL PAIN, UNSPECIFIED ABDOMINAL LOCATION: ICD-10-CM

## 2025-06-06 DIAGNOSIS — R82.998 CALCIUM OXALATE CRYSTALS IN URINE: ICD-10-CM

## 2025-06-06 DIAGNOSIS — R10.30 LOWER ABDOMINAL PAIN: ICD-10-CM

## 2025-06-06 PROCEDURE — 74178 CT ABD&PLV WO CNTR FLWD CNTR: CPT

## 2025-06-06 PROCEDURE — 76700 US EXAM ABDOM COMPLETE: CPT

## 2025-06-06 RX ADMIN — IOHEXOL 100 ML: 350 INJECTION, SOLUTION INTRAVENOUS at 07:36

## 2025-06-06 NOTE — TELEPHONE ENCOUNTER
Patient requesting appointment for possible vaginal prolapse.     No severe pain, no vaginal bleeding, no protruding.     Last seen by practice 12/14/2021.     Scheduled for soonest available new patient appointment 08/18/2025.    Patient prefers [place] office, but OK with [facility].     Routing to clerical to assist with scheduling sooner appointment.

## 2025-06-09 ENCOUNTER — RESULTS FOLLOW-UP (OUTPATIENT)
Dept: FAMILY MEDICINE CLINIC | Facility: CLINIC | Age: 60
End: 2025-06-09

## 2025-06-09 ENCOUNTER — TELEPHONE (OUTPATIENT)
Age: 60
End: 2025-06-09

## 2025-06-09 NOTE — TELEPHONE ENCOUNTER
Patient called regarding CT and US results. Informed patient of results, per PCP notes.    Patient requests further recommendation. She asks what the next steps are to determine the cause of her lower extremity swelling. She states both legs remain swollen, left worse than right, left foot is painful. She states this has been an ongoing issue for the past 6 months and it is affecting her ability to remain active.     Patient requests return call.

## 2025-06-09 NOTE — TELEPHONE ENCOUNTER
I know prior call she mentioned going to ER - if she is having increasing pain and swelling of the lower leg may be best to go to ER to get doppler done

## 2025-06-09 NOTE — TELEPHONE ENCOUNTER
Pt called stating she has not yet received the results from her CT and Ultrasound that were completed on 25.  Informed pt that final report has not yet been submitted by Radiology regarding her imaging tests.      Pt states her  doesn't want her to wait on the results as he wants her to go to [facility] ED now.  Pt states all the symptoms she is experiencing is exactly what her dad experienced before he .    Pt inquiring how PCP feels she should proceed at this time. Please advise.

## 2025-06-09 NOTE — TELEPHONE ENCOUNTER
Metformin generally does not cause swelling She can hold it and see if any change as perhaps she is having adverse reaction to it   Things can change and if she is having pain as she described in prior call she should have doppler recheck to rule out clot  She otherwise can schedule office followup to determine next step

## 2025-06-09 NOTE — TELEPHONE ENCOUNTER
Per pt she had a VAS done in December/Jan and if it's going to show the same thing. Pt asking if there is a specialist she can go to. If this is possibly due to medication she just started at her previous visit.

## 2025-06-10 ENCOUNTER — HOSPITAL ENCOUNTER (OUTPATIENT)
Dept: NON INVASIVE DIAGNOSTICS | Facility: HOSPITAL | Age: 60
Discharge: HOME | End: 2025-06-10
Attending: INTERNAL MEDICINE
Payer: COMMERCIAL

## 2025-06-10 DIAGNOSIS — R60.0 EDEMA OF BOTH LOWER LEGS: Primary | ICD-10-CM

## 2025-06-10 DIAGNOSIS — R60.0 EDEMA OF BOTH LOWER LEGS: ICD-10-CM

## 2025-06-10 PROCEDURE — 93970 EXTREMITY STUDY: CPT

## 2025-06-10 PROCEDURE — 93970 EXTREMITY STUDY: CPT | Performed by: SURGERY

## 2025-06-11 ENCOUNTER — RESULTS FOLLOW-UP (OUTPATIENT)
Dept: FAMILY MEDICINE CLINIC | Facility: CLINIC | Age: 60
End: 2025-06-11

## 2025-06-11 DIAGNOSIS — M79.605 PAIN OF LEFT LOWER EXTREMITY: Primary | ICD-10-CM

## 2025-06-11 NOTE — TELEPHONE ENCOUNTER
Patient returned missed call.  Relayed provider's message.  Patient expressed understanding, and would like an order place for an x-ray.    Please call patient back once order is placed.

## 2025-06-11 NOTE — TELEPHONE ENCOUNTER
Patient called back, read results. Patient admitted that she had stopped taking the medication about a week ago. She states the swelling in her right leg is almost completely gone and the swelling in her left leg has gone down.   However, patient states that a few years ago, she had fallen down the stairs and I injured her left foot. She admitted that she had not seen a doctor nor had gone to the ED. She states she got back up and put a boot on for a couple of weeks. Patient is asking if this may have something to do with why the swelling in her left leg has not completley gone like her right. She states that it feels like its bone rubbing on bone.   Please advise and notify patient.

## 2025-06-12 ENCOUNTER — RESULTS FOLLOW-UP (OUTPATIENT)
Dept: FAMILY MEDICINE CLINIC | Facility: CLINIC | Age: 60
End: 2025-06-12

## 2025-06-12 ENCOUNTER — HOSPITAL ENCOUNTER (OUTPATIENT)
Dept: RADIOLOGY | Facility: HOSPITAL | Age: 60
Discharge: HOME | End: 2025-06-12
Payer: COMMERCIAL

## 2025-06-12 DIAGNOSIS — M79.605 PAIN OF LEFT LOWER EXTREMITY: ICD-10-CM

## 2025-06-12 PROCEDURE — 73630 X-RAY EXAM OF FOOT: CPT

## 2025-06-16 DIAGNOSIS — E55.9 VITAMIN D DEFICIENCY: ICD-10-CM

## 2025-06-19 DIAGNOSIS — J06.9 URI, ACUTE: Primary | ICD-10-CM

## 2025-06-19 RX ORDER — FLUCONAZOLE 150 MG/1
150 TABLET ORAL ONCE
Qty: 1 TABLET | Refills: 0 | Status: SHIPPED | OUTPATIENT
Start: 2025-06-19 | End: 2025-06-19

## 2025-06-19 RX ORDER — AMOXICILLIN 500 MG/1
500 CAPSULE ORAL EVERY 8 HOURS SCHEDULED
Qty: 21 CAPSULE | Refills: 0 | Status: SHIPPED | OUTPATIENT
Start: 2025-06-19 | End: 2025-06-26

## 2025-06-22 DIAGNOSIS — F41.9 ANXIETY: ICD-10-CM

## 2025-06-22 RX ORDER — ALPRAZOLAM 0.5 MG
0.5 TABLET ORAL 2 TIMES DAILY PRN
Qty: 60 TABLET | Refills: 0 | Status: SHIPPED | OUTPATIENT
Start: 2025-06-22 | End: 2025-06-27 | Stop reason: SDUPTHER

## 2025-06-24 DIAGNOSIS — J98.01 BRONCHOSPASM: ICD-10-CM

## 2025-06-24 DIAGNOSIS — I10 ESSENTIAL HYPERTENSION: ICD-10-CM

## 2025-06-24 DIAGNOSIS — F41.9 ANXIETY: ICD-10-CM

## 2025-06-24 DIAGNOSIS — E55.9 VITAMIN D DEFICIENCY: ICD-10-CM

## 2025-06-24 DIAGNOSIS — E78.2 MIXED HYPERLIPIDEMIA: ICD-10-CM

## 2025-06-26 DIAGNOSIS — F41.9 ANXIETY: ICD-10-CM

## 2025-06-26 RX ORDER — CITALOPRAM HYDROBROMIDE 20 MG/1
20 TABLET ORAL DAILY
Qty: 90 TABLET | Refills: 1 | Status: SHIPPED | OUTPATIENT
Start: 2025-06-26

## 2025-06-26 RX ORDER — ERGOCALCIFEROL 1.25 MG/1
50000 CAPSULE, LIQUID FILLED ORAL WEEKLY
Qty: 12 CAPSULE | Refills: 3 | Status: SHIPPED | OUTPATIENT
Start: 2025-06-26

## 2025-06-26 RX ORDER — IPRATROPIUM BROMIDE 42 UG/1
2 SPRAY, METERED NASAL 2 TIMES DAILY
Qty: 15 ML | Refills: 3 | Status: SHIPPED | OUTPATIENT
Start: 2025-06-26

## 2025-06-26 RX ORDER — FLUTICASONE PROPIONATE 50 MCG
1 SPRAY, SUSPENSION (ML) NASAL DAILY
Qty: 15.8 ML | Refills: 1 | Status: SHIPPED | OUTPATIENT
Start: 2025-06-26

## 2025-06-26 RX ORDER — HYDROXYZINE HYDROCHLORIDE 10 MG/1
10 TABLET, FILM COATED ORAL EVERY 8 HOURS PRN
Qty: 180 TABLET | Refills: 1 | Status: SHIPPED | OUTPATIENT
Start: 2025-06-26

## 2025-06-26 RX ORDER — EZETIMIBE 10 MG/1
10 TABLET ORAL DAILY
Qty: 90 TABLET | Refills: 1 | Status: SHIPPED | OUTPATIENT
Start: 2025-06-26

## 2025-06-27 DIAGNOSIS — F41.9 ANXIETY: ICD-10-CM

## 2025-06-27 RX ORDER — ALPRAZOLAM 0.5 MG
0.5 TABLET ORAL 2 TIMES DAILY PRN
Qty: 60 TABLET | Refills: 0 | Status: SHIPPED | OUTPATIENT
Start: 2025-06-27

## 2025-06-27 NOTE — TELEPHONE ENCOUNTER
Needs to be sent to [facility]      Reason for call:   [x] Refill   [] Prior Auth  [] Other:     Office:   [x] PCP/Provider - [facility]  Stacey Mckeon  [] Specialty/Provider -     Medication: Xanax    Dose/Frequency: 0.5 mg     Quantity: #60    Pharmacy: [address] [facility]   Does the patient have enough for 3 days?   [] Yes   [x] No - Send as HP to POD    Mail Away Pharmacy   Does the patient have enough for 10 days?   [] Yes   [] No - Send as HP to POD

## 2025-07-09 DIAGNOSIS — J98.01 BRONCHOSPASM: ICD-10-CM

## 2025-07-10 RX ORDER — FLUTICASONE PROPIONATE 50 MCG
1 SPRAY, SUSPENSION (ML) NASAL DAILY
Qty: 15.8 ML | Refills: 2 | Status: SHIPPED | OUTPATIENT
Start: 2025-07-10

## 2025-07-10 RX ORDER — IPRATROPIUM BROMIDE 42 UG/1
2 SPRAY, METERED NASAL 2 TIMES DAILY
Qty: 15 ML | Refills: 0 | Status: SHIPPED | OUTPATIENT
Start: 2025-07-10

## 2025-07-21 NOTE — TELEPHONE ENCOUNTER
Patient aware rx will be sent for approval Pt here for Cardiac CTA test.  Administered 0 of metoprolol to achieve desired heart rate of 60 BPM.  Administered 0mg nitroglycerin sublingual for exam because BP is 91/58.  Pt tolerated well.  VSS. See flow sheet.  Reviewed Cardiac CTA discharge instructions with pt - verbalized understanding, no further questions. Pt discharged to home.

## 2025-07-25 ENCOUNTER — NURSE TRIAGE (OUTPATIENT)
Age: 60
End: 2025-07-25

## 2025-07-25 DIAGNOSIS — F41.9 ANXIETY: ICD-10-CM

## 2025-07-25 DIAGNOSIS — J32.9 RECURRENT SINUSITIS: Primary | ICD-10-CM

## 2025-07-25 RX ORDER — HYDROXYZINE HYDROCHLORIDE 10 MG/1
10 TABLET, FILM COATED ORAL EVERY 8 HOURS PRN
Qty: 90 TABLET | Refills: 1 | Status: SHIPPED | OUTPATIENT
Start: 2025-07-25

## 2025-07-25 RX ORDER — FLUCONAZOLE 150 MG/1
150 TABLET ORAL ONCE
Qty: 1 TABLET | Refills: 0 | Status: SHIPPED | OUTPATIENT
Start: 2025-07-25 | End: 2025-07-25

## 2025-07-25 RX ORDER — ALPRAZOLAM 0.5 MG
0.5 TABLET ORAL 2 TIMES DAILY PRN
Qty: 60 TABLET | Refills: 0 | Status: SHIPPED | OUTPATIENT
Start: 2025-07-25

## 2025-07-25 NOTE — TELEPHONE ENCOUNTER
"REASON FOR CONVERSATION: Nasal Congestion    SYMPTOMS: sinus pressure and pain, copious clear nasal drainage, watery eyes    OTHER HEALTH INFORMATION: allergies, frequent sinus infections    Patient is taking multiple allergy medications, including Flonase and nasal spray from ENT    Patient follows with ENT    PROTOCOL DISPOSITION: See Today or Tomorrow in Office    CARE ADVICE PROVIDED: Advised office visit today for evaluation. Patient declines, states it will be difficult for her to come into the office with the watery eyes and amount of nasal drainage.      Patient requests antibiotic prescription and prescription for yeast infection (for use after antibiotic). She uses [facility] only.    PRACTICE FOLLOW-UP: PCP recommendation      Reason for Disposition   Sinus congestion (pressure, fullness) present > 10 days    Answer Assessment - Initial Assessment Questions  1. LOCATION: \"Where does it hurt?\"       Sinus pain in face    2. ONSET: \"When did the sinus pain start?\"  (e.g., hours, days)       Today    3. SEVERITY: \"How bad is the pain?\"   (Scale 0-10; or none, mild, moderate or severe)      Moderate    4. RECURRENT SYMPTOM: \"Have you ever had sinus problems before?\" If Yes, ask: \"When was the last time?\" and \"What happened that time?\"       Yes, allergies and recurrent sinus infections    5. NASAL CONGESTION: \"Is the nose blocked?\" If Yes, ask: \"Can you open it or must you breathe through your mouth?\"      Yes    6. NASAL DISCHARGE: \"Do you have discharge from your nose?\" If so ask, \"What color?\"      Yes, copious amounts of clear drainage    7. FEVER: \"Do you have a fever?\" If Yes, ask: \"What is it, how was it measured, and when did it start?\"       Denies    8. OTHER SYMPTOMS: \"Do you have any other symptoms?\" (e.g., sore throat, cough, earache, difficulty breathing)      Watery eyes    Protocols used: Sinus Pain or Congestion-Adult-[place]    "

## 2025-08-05 ENCOUNTER — TELEPHONE (OUTPATIENT)
Dept: FAMILY MEDICINE CLINIC | Facility: CLINIC | Age: 60
End: 2025-08-05

## 2025-08-07 ENCOUNTER — APPOINTMENT (OUTPATIENT)
Dept: LAB | Facility: MEDICAL CENTER | Age: 60
End: 2025-08-07
Attending: INTERNAL MEDICINE
Payer: COMMERCIAL

## 2025-08-07 ENCOUNTER — OFFICE VISIT (OUTPATIENT)
Dept: FAMILY MEDICINE CLINIC | Facility: CLINIC | Age: 60
End: 2025-08-07
Payer: COMMERCIAL

## 2025-08-07 VITALS
BODY MASS INDEX: 35.79 KG/M2 | RESPIRATION RATE: 18 BRPM | TEMPERATURE: 98 F | HEART RATE: 84 BPM | SYSTOLIC BLOOD PRESSURE: 126 MMHG | HEIGHT: 63 IN | OXYGEN SATURATION: 96 % | WEIGHT: 202 LBS | DIASTOLIC BLOOD PRESSURE: 78 MMHG

## 2025-08-07 DIAGNOSIS — J32.4 CHRONIC PANSINUSITIS: ICD-10-CM

## 2025-08-07 DIAGNOSIS — D75.839 THROMBOCYTOSIS: ICD-10-CM

## 2025-08-07 DIAGNOSIS — J44.0 CHRONIC OBSTRUCTIVE PULMONARY DISEASE WITH ACUTE LOWER RESPIRATORY INFECTION (HCC): ICD-10-CM

## 2025-08-07 DIAGNOSIS — J98.01 BRONCHOSPASM: ICD-10-CM

## 2025-08-07 DIAGNOSIS — R05.9 COUGH: ICD-10-CM

## 2025-08-07 DIAGNOSIS — J30.1 SEASONAL ALLERGIC RHINITIS DUE TO POLLEN: ICD-10-CM

## 2025-08-07 DIAGNOSIS — J01.00 ACUTE MAXILLARY SINUSITIS, RECURRENCE NOT SPECIFIED: Primary | ICD-10-CM

## 2025-08-07 DIAGNOSIS — I10 HYPERTENSION, UNSPECIFIED TYPE: ICD-10-CM

## 2025-08-07 PROBLEM — R10.11 RUQ PAIN: Status: RESOLVED | Noted: 2024-11-12 | Resolved: 2025-08-07

## 2025-08-07 PROCEDURE — 99214 OFFICE O/P EST MOD 30 MIN: CPT | Performed by: INTERNAL MEDICINE

## 2025-08-07 RX ORDER — IPRATROPIUM BROMIDE 42 UG/1
2 SPRAY, METERED NASAL 2 TIMES DAILY
Qty: 15 ML | Refills: 0 | Status: SHIPPED | OUTPATIENT
Start: 2025-08-07

## 2025-08-07 RX ORDER — FLUCONAZOLE 150 MG/1
150 TABLET ORAL ONCE
Qty: 1 TABLET | Refills: 0 | Status: SHIPPED | OUTPATIENT
Start: 2025-08-07 | End: 2025-08-07

## 2025-08-07 RX ORDER — MONTELUKAST SODIUM 10 MG/1
10 TABLET ORAL DAILY
COMMUNITY
Start: 2025-07-01

## 2025-08-07 RX ORDER — FLUCONAZOLE 150 MG/1
1 TABLET ORAL DAILY
COMMUNITY
Start: 2025-07-25

## 2025-08-07 RX ORDER — ALBUTEROL SULFATE 90 UG/1
2 INHALANT RESPIRATORY (INHALATION) EVERY 6 HOURS PRN
Qty: 8.5 G | Refills: 5 | Status: SHIPPED | OUTPATIENT
Start: 2025-08-07

## 2025-08-22 DIAGNOSIS — E55.9 VITAMIN D DEFICIENCY: ICD-10-CM

## 2025-08-22 DIAGNOSIS — E78.2 MIXED HYPERLIPIDEMIA: ICD-10-CM

## 2025-08-22 DIAGNOSIS — F41.9 ANXIETY: ICD-10-CM

## 2025-08-22 RX ORDER — ALPRAZOLAM 0.5 MG
0.5 TABLET ORAL 2 TIMES DAILY PRN
Qty: 60 TABLET | Refills: 0 | Status: SHIPPED | OUTPATIENT
Start: 2025-08-22

## 2025-08-22 RX ORDER — EZETIMIBE 10 MG/1
10 TABLET ORAL DAILY
Qty: 90 TABLET | Refills: 1 | Status: SHIPPED | OUTPATIENT
Start: 2025-08-22

## 2025-08-22 RX ORDER — ERGOCALCIFEROL 1.25 MG/1
50000 CAPSULE, LIQUID FILLED ORAL WEEKLY
Qty: 12 CAPSULE | Refills: 0 | Status: SHIPPED | OUTPATIENT
Start: 2025-08-22

## (undated) DEVICE — BOWL: 16OZ PEELPOUCH 75/CS 16/PLT: Brand: MEDEGEN MEDICAL PRODUCTS, LLC

## (undated) DEVICE — GAUZE SPONGES,8 PLY: Brand: CURITY

## (undated) DEVICE — NEEDLE 18 G X 1 1/2 SAFETY

## (undated) DEVICE — CHLORAPREP HI-LITE 26ML ORANGE

## (undated) DEVICE — GAUZE SPONGES,16 PLY: Brand: CURITY

## (undated) DEVICE — SUT SILK 2-0 KS 30 IN 623H

## (undated) DEVICE — BLADE MYRINGOTOMY 377121

## (undated) DEVICE — ELECTRODE LAP L WIRE E-Z CLEAN 33CM -0100

## (undated) DEVICE — CATH URET 12FR RED RUBBER

## (undated) DEVICE — TROCAR: Brand: KII SLEEVE

## (undated) DEVICE — GLOVE INDICATOR PI UNDERGLOVE SZ 6.5 BLUE

## (undated) DEVICE — ANTIBACTERIAL UNDYED BRAIDED (POLYGLACTIN 910), SYNTHETIC ABSORBABLE SUTURE: Brand: COATED VICRYL

## (undated) DEVICE — BINDER ABDOMINAL 46-62 IN

## (undated) DEVICE — INSTRUMENT TRACKER 9733533XOM ENT 1PK

## (undated) DEVICE — 5 MM CURVED DISSECTORS WITH MONOPOLAR CAUTERY: Brand: ENDOPATH

## (undated) DEVICE — NEEDLE HYPO 22G X 1-1/2 IN

## (undated) DEVICE — TUBING SUCTION 5MM X 12 FT

## (undated) DEVICE — STERILE BETHLEHEM NASAL PACK: Brand: CARDINAL HEALTH

## (undated) DEVICE — NEPTUNE E-SEP SMOKE EVACUATION PENCIL, COATED, 70MM BLADE, PUSH BUTTON SWITCH: Brand: NEPTUNE E-SEP

## (undated) DEVICE — WAND COBLATION REFLEX ULTRA 45

## (undated) DEVICE — SYRINGE 10ML LL

## (undated) DEVICE — 4-PORT MANIFOLD: Brand: NEPTUNE 2

## (undated) DEVICE — DISPOSABLE OR TOWEL: Brand: CARDINAL HEALTH

## (undated) DEVICE — GLOVE INDICATOR PI UNDERGLOVE SZ 7 BLUE

## (undated) DEVICE — DRESSING MEPORE FILM ADHESIVE 4 X 5IN

## (undated) DEVICE — ENDOPOUCH RETRIEVER SPECIMEN RETRIEVAL BAGS: Brand: ENDOPOUCH RETRIEVER

## (undated) DEVICE — METZENBAUM ADTEC SINGLE USE DISSECTING SCISSORS, SHAFT ONLY, MONOPOLAR, CURVED TO LEFT, WORKING LENGTH: 12 1/4", (310 MM), DIAM. 5 MM, INSULATED, DOUBLE ACTION, STERILE, DISPOSABLE, PACKAGE OF 10 PIECES: Brand: AESCULAP

## (undated) DEVICE — SUT CHROMIC 4-0 P-3 18 MM 1654G

## (undated) DEVICE — MASTISOL LIQ ADHESIVE 2/3ML

## (undated) DEVICE — SUT VICRYL 2-0 SH 27 IN UNDYED J417H

## (undated) DEVICE — GLOVE INDICATOR PI UNDERGLOVE SZ 8 BLUE

## (undated) DEVICE — SYRINGE 3ML LL

## (undated) DEVICE — GLOVE PI ULTRA TOUCH SZ.7.0

## (undated) DEVICE — 3M™ TEGADERM™ TRANSPARENT FILM DRESSING FRAME STYLE, 1626W, 4 IN X 4-3/4 IN (10 CM X 12 CM), 50/CT 4CT/CASE: Brand: 3M™ TEGADERM™

## (undated) DEVICE — INTENDED FOR TISSUE SEPARATION, AND OTHER PROCEDURES THAT REQUIRE A SHARP SURGICAL BLADE TO PUNCTURE OR CUT.: Brand: BARD-PARKER SAFETY BLADES SIZE 15, STERILE

## (undated) DEVICE — TRANSPOSAL ULTRAFLEX DUO/QUAD ULTRA CART MANIFOLD

## (undated) DEVICE — NEEDLE 22 G X 1 1/2 SAFETY

## (undated) DEVICE — PAD GROUNDING DUAL ADULT

## (undated) DEVICE — SUT VICRYL 0 UR-6 27 IN J603H

## (undated) DEVICE — PATIENT TRACKER 9734887XOM NON-INVASIVE

## (undated) DEVICE — GAUZE SPONGES,USP TYPE VII GAUZE, 12 PLY: Brand: CURITY

## (undated) DEVICE — CAUTERY TIP POLISHER: Brand: DEVON

## (undated) DEVICE — BLADE 1882040 5PK INFERIOR TURB 2MM

## (undated) DEVICE — SPLINT 1524050 5PK PAIR DOYLE II AIRWAY: Brand: DOYLE II ™

## (undated) DEVICE — LIGAMAX 5 MM ENDOSCOPIC MULTIPLE CLIP APPLIER: Brand: LIGAMAX

## (undated) DEVICE — BULB SYRINGE,IRRIGATION WITH PROTECTIVE CAP: Brand: DOVER

## (undated) DEVICE — SPLINT 1524055 DOYLE II AIRWAY SET: Brand: DOYLE II ™

## (undated) DEVICE — IV CATH 18 G X 1.16 IN

## (undated) DEVICE — SPECIMEN CONTAINER STERILE PEEL PACK

## (undated) DEVICE — STERILE BETHLEHEM T AND A PACK: Brand: CARDINAL HEALTH

## (undated) DEVICE — MEDI-VAC YANK SUCT HNDL W/TPRD BULBOUS TIP: Brand: CARDINAL HEALTH

## (undated) DEVICE — BLADE 1884004HR TRICUT 5PK M4 4MM ROTATE: Brand: TRICUT

## (undated) DEVICE — ANTI-FOG SOLUTION WITH FOAM PAD: Brand: DEVON

## (undated) DEVICE — SPONGE TONSIL STRUNG 7/8 IN X-RAY DETECT

## (undated) DEVICE — SUT MONOCRYL 4-0 PS-2 27 IN Y426H

## (undated) DEVICE — MINI BLADE ROUND TIP SHARP ON ONE SIDE

## (undated) DEVICE — THREE-QUARTER SHEET: Brand: CONVERTORS

## (undated) DEVICE — TROCARS: Brand: KII® BALLOON BLUNT TIP SYSTEM

## (undated) DEVICE — 3M™ TEGADERM™ TRANSPARENT FILM DRESSING FRAME STYLE, 1624W, 2-3/8 IN X 2-3/4 IN (6 CM X 7 CM), 100/CT 4CT/CASE: Brand: 3M™ TEGADERM™

## (undated) DEVICE — NEURO PATTIES 1/2 X 3

## (undated) DEVICE — SCD SEQUENTIAL COMPRESSION COMFORT SLEEVE MEDIUM KNEE LENGTH: Brand: KENDALL SCD

## (undated) DEVICE — ENDOPATH 5 MM GRASPERS WITH RATCHET HANDLES: Brand: ENDOPATH

## (undated) DEVICE — SINGLE PORT MANIFOLD: Brand: NEPTUNE 2

## (undated) DEVICE — MAYO STAND COVER: Brand: CONVERTORS

## (undated) DEVICE — SYRINGE 20ML LL

## (undated) DEVICE — 3M™ STERI-STRIP™ REINFORCED ADHESIVE SKIN CLOSURES, R1546, 1/4 IN X 4 IN (6 MM X 100 MM), 10 STRIPS/ENVELOPE: Brand: 3M™ STERI-STRIP™

## (undated) DEVICE — PACK PBDS LAP CHOLE RF

## (undated) DEVICE — SUCTION COAGULATOR 10FR FOOT CNTRL MEGADYNE

## (undated) DEVICE — SUT PLAIN 4-0 SC-1 18 IN 1828H

## (undated) DEVICE — SUT PDS II 1 CT-1 27 IN Z341H

## (undated) DEVICE — DRAPE EQUIPMENT RF WAND

## (undated) DEVICE — AIRLIFE™ TRI-FLO™ SUCTION CATHETER WITH CONTROL PORT: Brand: AIRLIFE™

## (undated) DEVICE — GLOVE SRG LF STRL BGL SKNSNS 8 PF

## (undated) DEVICE — TUBING SMOKE EVAC W/FILTRATION DEVICE PLUMEPORT ACTIV